# Patient Record
Sex: MALE | Race: WHITE | NOT HISPANIC OR LATINO | Employment: FULL TIME | URBAN - METROPOLITAN AREA
[De-identification: names, ages, dates, MRNs, and addresses within clinical notes are randomized per-mention and may not be internally consistent; named-entity substitution may affect disease eponyms.]

---

## 2023-05-05 LAB
ALANINE AMINOTRANSFERASE (SGPT) (U/L) IN SER/PLAS: 81 U/L (ref 10–52)
ALBUMIN (G/DL) IN SER/PLAS: 4.1 G/DL (ref 3.4–5)
ALKALINE PHOSPHATASE (U/L) IN SER/PLAS: 190 U/L (ref 33–136)
ANION GAP IN SER/PLAS: 14 MMOL/L (ref 10–20)
ASPARTATE AMINOTRANSFERASE (SGOT) (U/L) IN SER/PLAS: 132 U/L (ref 9–39)
BILIRUBIN TOTAL (MG/DL) IN SER/PLAS: 1.2 MG/DL (ref 0–1.2)
CALCIUM (MG/DL) IN SER/PLAS: 10.1 MG/DL (ref 8.6–10.6)
CARBON DIOXIDE, TOTAL (MMOL/L) IN SER/PLAS: 29 MMOL/L (ref 21–32)
CHLORIDE (MMOL/L) IN SER/PLAS: 102 MMOL/L (ref 98–107)
CREATININE (MG/DL) IN SER/PLAS: 0.85 MG/DL (ref 0.5–1.3)
ERYTHROCYTE DISTRIBUTION WIDTH (RATIO) BY AUTOMATED COUNT: 15.2 % (ref 11.5–14.5)
ERYTHROCYTE MEAN CORPUSCULAR HEMOGLOBIN CONCENTRATION (G/DL) BY AUTOMATED: 32.9 G/DL (ref 32–36)
ERYTHROCYTE MEAN CORPUSCULAR VOLUME (FL) BY AUTOMATED COUNT: 105 FL (ref 80–100)
ERYTHROCYTES (10*6/UL) IN BLOOD BY AUTOMATED COUNT: 3.94 X10E12/L (ref 4.5–5.9)
GFR MALE: >90 ML/MIN/1.73M2
GLUCOSE (MG/DL) IN SER/PLAS: 106 MG/DL (ref 74–99)
HEMATOCRIT (%) IN BLOOD BY AUTOMATED COUNT: 41.4 % (ref 41–52)
HEMOGLOBIN (G/DL) IN BLOOD: 13.6 G/DL (ref 13.5–17.5)
INR IN PPP BY COAGULATION ASSAY: 1.1 (ref 0.9–1.1)
LEUKOCYTES (10*3/UL) IN BLOOD BY AUTOMATED COUNT: 9.4 X10E9/L (ref 4.4–11.3)
NRBC (PER 100 WBCS) BY AUTOMATED COUNT: 0 /100 WBC (ref 0–0)
PLATELETS (10*3/UL) IN BLOOD AUTOMATED COUNT: 154 X10E9/L (ref 150–450)
POTASSIUM (MMOL/L) IN SER/PLAS: 4.5 MMOL/L (ref 3.5–5.3)
PROTEIN TOTAL: 8 G/DL (ref 6.4–8.2)
PROTHROMBIN TIME (PT) IN PPP BY COAGULATION ASSAY: 13 SEC (ref 9.8–13.4)
SODIUM (MMOL/L) IN SER/PLAS: 140 MMOL/L (ref 136–145)
UREA NITROGEN (MG/DL) IN SER/PLAS: 16 MG/DL (ref 6–23)

## 2023-05-15 LAB — HEMOCHROMATOSIS INTERPRETATION: NORMAL

## 2023-07-27 ENCOUNTER — OFFICE VISIT (OUTPATIENT)
Dept: PRIMARY CARE | Facility: CLINIC | Age: 63
End: 2023-07-27
Payer: COMMERCIAL

## 2023-07-27 VITALS
HEART RATE: 67 BPM | BODY MASS INDEX: 30.84 KG/M2 | WEIGHT: 227.4 LBS | DIASTOLIC BLOOD PRESSURE: 70 MMHG | OXYGEN SATURATION: 96 % | SYSTOLIC BLOOD PRESSURE: 108 MMHG

## 2023-07-27 DIAGNOSIS — F10.10 ALCOHOL ABUSE: Primary | ICD-10-CM

## 2023-07-27 PROBLEM — K76.0 HEPATIC STEATOSIS: Status: ACTIVE | Noted: 2023-07-27

## 2023-07-27 PROBLEM — M48.061 SPINAL STENOSIS OF LUMBAR REGION: Status: ACTIVE | Noted: 2023-07-27

## 2023-07-27 PROBLEM — M51.36 DEGENERATION OF INTERVERTEBRAL DISC OF LUMBAR REGION: Status: ACTIVE | Noted: 2023-07-27

## 2023-07-27 PROBLEM — I10 BENIGN ESSENTIAL HTN: Status: ACTIVE | Noted: 2023-07-27

## 2023-07-27 PROBLEM — M10.9 GOUT: Status: ACTIVE | Noted: 2018-10-02

## 2023-07-27 PROBLEM — M10.9 GOUT: Status: ACTIVE | Noted: 2023-07-27

## 2023-07-27 PROBLEM — R79.89 ABNORMAL LFTS: Status: ACTIVE | Noted: 2023-07-27

## 2023-07-27 PROBLEM — R97.20 ELEVATED PSA: Status: ACTIVE | Noted: 2023-07-27

## 2023-07-27 PROBLEM — I10 HYPERTENSION: Status: ACTIVE | Noted: 2023-07-27

## 2023-07-27 PROBLEM — C61 PROSTATE CANCER (MULTI): Status: ACTIVE | Noted: 2023-07-27

## 2023-07-27 PROBLEM — M06.9 RHEUMATOID ARTHRITIS (MULTI): Status: ACTIVE | Noted: 2018-10-12

## 2023-07-27 PROBLEM — K20.90 ESOPHAGITIS: Status: ACTIVE | Noted: 2023-07-27

## 2023-07-27 PROBLEM — M51.369 DEGENERATION OF INTERVERTEBRAL DISC OF LUMBAR REGION: Status: ACTIVE | Noted: 2023-07-27

## 2023-07-27 PROBLEM — M72.2 BILATERAL PLANTAR FASCIITIS: Status: ACTIVE | Noted: 2023-07-27

## 2023-07-27 PROBLEM — G62.9 NEUROPATHY, PERIPHERAL: Status: ACTIVE | Noted: 2023-07-27

## 2023-07-27 PROBLEM — N52.9 MALE ERECTILE DISORDER: Status: ACTIVE | Noted: 2023-07-27

## 2023-07-27 PROBLEM — Z95.0 PACEMAKER: Status: ACTIVE | Noted: 2023-07-27

## 2023-07-27 PROCEDURE — 3078F DIAST BP <80 MM HG: CPT | Performed by: STUDENT IN AN ORGANIZED HEALTH CARE EDUCATION/TRAINING PROGRAM

## 2023-07-27 PROCEDURE — 3074F SYST BP LT 130 MM HG: CPT | Performed by: STUDENT IN AN ORGANIZED HEALTH CARE EDUCATION/TRAINING PROGRAM

## 2023-07-27 PROCEDURE — 99214 OFFICE O/P EST MOD 30 MIN: CPT | Performed by: STUDENT IN AN ORGANIZED HEALTH CARE EDUCATION/TRAINING PROGRAM

## 2023-07-27 PROCEDURE — 3008F BODY MASS INDEX DOCD: CPT | Performed by: STUDENT IN AN ORGANIZED HEALTH CARE EDUCATION/TRAINING PROGRAM

## 2023-07-27 RX ORDER — PANTOPRAZOLE SODIUM 40 MG/1
1 TABLET, DELAYED RELEASE ORAL DAILY
COMMUNITY
Start: 2022-11-28 | End: 2023-12-26

## 2023-07-27 RX ORDER — FOLIC ACID 1 MG/1
1 TABLET ORAL DAILY
COMMUNITY
Start: 2022-12-13 | End: 2023-12-28

## 2023-07-27 RX ORDER — DIAZEPAM 5 MG/1
5 TABLET ORAL NIGHTLY PRN
Qty: 10 TABLET | Refills: 0 | Status: SHIPPED | OUTPATIENT
Start: 2023-07-27 | End: 2023-10-26 | Stop reason: SDUPTHER

## 2023-07-27 RX ORDER — AMLODIPINE BESYLATE 5 MG/1
TABLET ORAL EVERY 24 HOURS
COMMUNITY
Start: 2015-09-29 | End: 2023-10-26 | Stop reason: ALTCHOICE

## 2023-07-27 RX ORDER — MULTIVIT-MIN/IRON FUM/FOLIC AC 7.5 MG-4
1 TABLET ORAL DAILY
COMMUNITY

## 2023-07-27 RX ORDER — ERGOCALCIFEROL 1.25 MG/1
CAPSULE ORAL
COMMUNITY
Start: 2018-02-23 | End: 2023-10-26 | Stop reason: ALTCHOICE

## 2023-07-27 RX ORDER — ALBUTEROL SULFATE 90 UG/1
AEROSOL, METERED RESPIRATORY (INHALATION)
COMMUNITY
Start: 2021-12-16 | End: 2023-10-26 | Stop reason: ALTCHOICE

## 2023-07-27 RX ORDER — TADALAFIL 20 MG/1
TABLET ORAL
COMMUNITY
Start: 2017-06-20 | End: 2023-10-26 | Stop reason: ALTCHOICE

## 2023-07-27 RX ORDER — METHYLPREDNISOLONE 4 MG/1
TABLET ORAL
COMMUNITY
Start: 2022-10-06 | End: 2023-10-26 | Stop reason: ALTCHOICE

## 2023-07-27 RX ORDER — PREDNISONE 50 MG/1
TABLET ORAL
COMMUNITY
Start: 2019-07-16 | End: 2023-10-26 | Stop reason: ALTCHOICE

## 2023-07-27 RX ORDER — GABAPENTIN 100 MG/1
1 CAPSULE ORAL 3 TIMES DAILY
COMMUNITY
Start: 2023-01-17 | End: 2023-10-26 | Stop reason: ALTCHOICE

## 2023-07-27 RX ORDER — ASPIRIN 81 MG/1
TABLET ORAL
COMMUNITY
Start: 2018-02-23 | End: 2023-10-26 | Stop reason: ALTCHOICE

## 2023-07-27 RX ORDER — ALLOPURINOL 300 MG/1
300 TABLET ORAL DAILY
COMMUNITY
Start: 2020-07-02 | End: 2023-12-26

## 2023-07-27 RX ORDER — TAMSULOSIN HYDROCHLORIDE 0.4 MG/1
1 CAPSULE ORAL DAILY
COMMUNITY
Start: 2022-11-28 | End: 2023-12-26

## 2023-07-27 RX ORDER — DOXYCYCLINE HYCLATE 100 MG
TABLET ORAL
COMMUNITY
Start: 2022-11-28 | End: 2023-10-26 | Stop reason: ALTCHOICE

## 2023-07-27 RX ORDER — LISINOPRIL 30 MG/1
TABLET ORAL
COMMUNITY
Start: 2014-12-18 | End: 2023-10-26 | Stop reason: ALTCHOICE

## 2023-07-27 RX ORDER — METOPROLOL TARTRATE 50 MG/1
1 TABLET ORAL 2 TIMES DAILY
COMMUNITY
Start: 2022-11-28 | End: 2023-10-24

## 2023-07-27 RX ORDER — HYDROCHLOROTHIAZIDE 25 MG/1
TABLET ORAL
COMMUNITY
Start: 2022-05-17 | End: 2023-10-26 | Stop reason: ALTCHOICE

## 2023-07-27 RX ORDER — GABAPENTIN 300 MG/1
CAPSULE ORAL
COMMUNITY
End: 2023-10-26 | Stop reason: ALTCHOICE

## 2023-07-27 RX ORDER — LANOLIN ALCOHOL/MO/W.PET/CERES
CREAM (GRAM) TOPICAL
COMMUNITY
Start: 2022-11-28 | End: 2023-10-26 | Stop reason: ALTCHOICE

## 2023-07-27 RX ORDER — SILDENAFIL CITRATE 20 MG/1
TABLET ORAL
COMMUNITY
Start: 2020-07-17 | End: 2023-10-26 | Stop reason: ALTCHOICE

## 2023-07-27 RX ORDER — CHLORHEXIDINE GLUCONATE ORAL RINSE 1.2 MG/ML
SOLUTION DENTAL
COMMUNITY
Start: 2023-05-25 | End: 2023-10-26 | Stop reason: ALTCHOICE

## 2023-07-27 RX ORDER — DISULFIRAM 250 MG/1
250 TABLET ORAL DAILY
Qty: 90 TABLET | Refills: 3 | Status: SHIPPED | OUTPATIENT
Start: 2023-07-27 | End: 2024-03-23 | Stop reason: ALTCHOICE

## 2023-07-27 RX ORDER — CLINDAMYCIN HYDROCHLORIDE 300 MG/1
CAPSULE ORAL
COMMUNITY
Start: 2023-04-28 | End: 2023-10-26 | Stop reason: ALTCHOICE

## 2023-07-27 ASSESSMENT — PAIN SCALES - GENERAL: PAINLEVEL: 7

## 2023-07-27 ASSESSMENT — ENCOUNTER SYMPTOMS: DEPRESSION: 0

## 2023-07-27 NOTE — PATIENT INSTRUCTIONS
1.  Alcohol abuse.  Had recurrence recently.  He is going to try and discontinue over the next several days.  Small prescription of diazepam to help avoid withdrawals.  When you are ready and alcohol free for greater than 12 hours can start Antabuse 250 mg once per day.  Also advised to establish with alcohol and wellness meetings

## 2023-07-27 NOTE — PROGRESS NOTES
Subjective   Patient ID: Trey Borjas is a 63 y.o. male who presents for Alcohol Problem.    HPI after hospital admission in December for alcoholism he was doing well for several months then the past 2 months he is relapsed.    Review of Systems  Constitutional: NO F, chills, or sweats  Eyes: no blurred vision or visual disturbance  ENT: no hearing loss, no congestion, no nasal discharge, no hoarseness and no sore throat.   Cardiovascular: no chest pain, no edema, no palps and no syncope.   Respiratory: no cough,no s.o.b. and no wheezing  Gastrointestinal: no abdominal pain, No C/D no N/V, no blood in stools  Genitourinary: no dysuria, no change in urinary frequency, no urinary hesitancy and no feelings of urinary urgency.   Musculoskeletal: no arthralgias,  no back pain and no myalgias.   Integumentary: no new skin lesions and no rashes.   Neurological: no difficulty walking, no headache, no limb weakness, no numbness and no tingling.   Psychiatric: Relapse with alcoholism  Objective   /70 (BP Location: Left arm, Patient Position: Sitting, BP Cuff Size: Adult)   Pulse 67   Wt 103 kg (227 lb 6.4 oz)   SpO2 96%   BMI 30.84 kg/m²     Physical Exam  gen- a & o x 3, nad, pleasant  heent- eomi, perrla, ear canals patent, TM's non-erythematous, no fluid, frontal and maxillary sinus's nontender  neck- supple, nontender, no palpable or enlarged nodes, no thyromegaly  heart- rrr, no murmurs  lungs- cta b/l , no w/r/r    Assessment/Plan     1.  Alcohol abuse.  Had recurrence recently.  He is going to try and discontinue over the next several days.  Small prescription of diazepam to help avoid withdrawals.  When you are ready and alcohol free for greater than 12 hours can start Antabuse 250 mg once per day.  Also advised to establish with alcohol and wellness meetings

## 2023-09-22 ENCOUNTER — TELEPHONE (OUTPATIENT)
Dept: PRIMARY CARE | Facility: CLINIC | Age: 63
End: 2023-09-22
Payer: COMMERCIAL

## 2023-09-28 ENCOUNTER — OFFICE VISIT (OUTPATIENT)
Dept: PRIMARY CARE | Facility: CLINIC | Age: 63
End: 2023-09-28
Payer: COMMERCIAL

## 2023-09-28 VITALS
SYSTOLIC BLOOD PRESSURE: 130 MMHG | BODY MASS INDEX: 30 KG/M2 | WEIGHT: 221.2 LBS | DIASTOLIC BLOOD PRESSURE: 80 MMHG | HEART RATE: 75 BPM | OXYGEN SATURATION: 98 %

## 2023-09-28 DIAGNOSIS — L73.9 FOLLICULITIS: Primary | ICD-10-CM

## 2023-09-28 DIAGNOSIS — Z23 IMMUNIZATION DUE: ICD-10-CM

## 2023-09-28 PROCEDURE — 3075F SYST BP GE 130 - 139MM HG: CPT | Performed by: STUDENT IN AN ORGANIZED HEALTH CARE EDUCATION/TRAINING PROGRAM

## 2023-09-28 PROCEDURE — 99213 OFFICE O/P EST LOW 20 MIN: CPT | Performed by: STUDENT IN AN ORGANIZED HEALTH CARE EDUCATION/TRAINING PROGRAM

## 2023-09-28 PROCEDURE — 90471 IMMUNIZATION ADMIN: CPT | Performed by: STUDENT IN AN ORGANIZED HEALTH CARE EDUCATION/TRAINING PROGRAM

## 2023-09-28 PROCEDURE — 3008F BODY MASS INDEX DOCD: CPT | Performed by: STUDENT IN AN ORGANIZED HEALTH CARE EDUCATION/TRAINING PROGRAM

## 2023-09-28 PROCEDURE — 3079F DIAST BP 80-89 MM HG: CPT | Performed by: STUDENT IN AN ORGANIZED HEALTH CARE EDUCATION/TRAINING PROGRAM

## 2023-09-28 PROCEDURE — 90686 IIV4 VACC NO PRSV 0.5 ML IM: CPT | Performed by: STUDENT IN AN ORGANIZED HEALTH CARE EDUCATION/TRAINING PROGRAM

## 2023-09-28 RX ORDER — SULFAMETHOXAZOLE AND TRIMETHOPRIM 800; 160 MG/1; MG/1
1 TABLET ORAL 2 TIMES DAILY
Qty: 20 TABLET | Refills: 0 | Status: SHIPPED | OUTPATIENT
Start: 2023-09-28 | End: 2023-10-08

## 2023-09-28 ASSESSMENT — ENCOUNTER SYMPTOMS: DEPRESSION: 0

## 2023-09-28 ASSESSMENT — PAIN SCALES - GENERAL: PAINLEVEL: 6

## 2023-09-28 NOTE — PROGRESS NOTES
Subjective   Patient ID: Trey Borjas is a 63 y.o. male who presents for Rash (Rash on scalp,neck, and around eyes.).    HPI comes in for rash posterior scalp    Review of Systems  Derm-rash  Objective   /80 (BP Location: Left arm, Patient Position: Sitting, BP Cuff Size: Adult)   Pulse 75   Wt 100 kg (221 lb 3.2 oz)   SpO2 98%   BMI 30.00 kg/m²     Physical Exam  Derm-folliculitis multiple areas posterior scalp  Assessment/Plan     1.  Folliculitis posterior scalp.  Bactrim p.o. twice daily x10 days.

## 2023-10-24 DIAGNOSIS — I10 ESSENTIAL (PRIMARY) HYPERTENSION: ICD-10-CM

## 2023-10-24 RX ORDER — METOPROLOL TARTRATE 50 MG/1
50 TABLET ORAL 2 TIMES DAILY
Qty: 180 TABLET | Refills: 3 | Status: SHIPPED | OUTPATIENT
Start: 2023-10-24 | End: 2024-04-22 | Stop reason: HOSPADM

## 2023-10-26 ENCOUNTER — OFFICE VISIT (OUTPATIENT)
Dept: PRIMARY CARE | Facility: CLINIC | Age: 63
End: 2023-10-26
Payer: COMMERCIAL

## 2023-10-26 VITALS
DIASTOLIC BLOOD PRESSURE: 66 MMHG | BODY MASS INDEX: 30.14 KG/M2 | SYSTOLIC BLOOD PRESSURE: 122 MMHG | WEIGHT: 222.2 LBS | HEART RATE: 72 BPM | OXYGEN SATURATION: 98 %

## 2023-10-26 DIAGNOSIS — F10.10 ALCOHOL ABUSE: ICD-10-CM

## 2023-10-26 PROCEDURE — 99214 OFFICE O/P EST MOD 30 MIN: CPT | Performed by: STUDENT IN AN ORGANIZED HEALTH CARE EDUCATION/TRAINING PROGRAM

## 2023-10-26 PROCEDURE — 3078F DIAST BP <80 MM HG: CPT | Performed by: STUDENT IN AN ORGANIZED HEALTH CARE EDUCATION/TRAINING PROGRAM

## 2023-10-26 PROCEDURE — 3074F SYST BP LT 130 MM HG: CPT | Performed by: STUDENT IN AN ORGANIZED HEALTH CARE EDUCATION/TRAINING PROGRAM

## 2023-10-26 PROCEDURE — 3008F BODY MASS INDEX DOCD: CPT | Performed by: STUDENT IN AN ORGANIZED HEALTH CARE EDUCATION/TRAINING PROGRAM

## 2023-10-26 RX ORDER — DIAZEPAM 5 MG/1
5 TABLET ORAL NIGHTLY PRN
Qty: 10 TABLET | Refills: 0 | Status: SHIPPED | OUTPATIENT
Start: 2023-10-26 | End: 2024-03-23 | Stop reason: ALTCHOICE

## 2023-10-26 ASSESSMENT — ENCOUNTER SYMPTOMS: DEPRESSION: 0

## 2023-10-26 ASSESSMENT — PAIN SCALES - GENERAL: PAINLEVEL: 7

## 2023-10-26 NOTE — PROGRESS NOTES
Subjective   Patient ID: Trey Borjas is a 63 y.o. male who presents for neuropathy.    HPI comes in to discuss peripheral neuropathy.  Alcoholism.    Review of Systems  Constitutional: NO F, chills, or sweats  Eyes: no blurred vision or visual disturbance  ENT: no hearing loss, no congestion, no nasal discharge, no hoarseness and no sore throat.   Cardiovascular: no chest pain, no edema, no palps and no syncope.   Respiratory: no cough,no s.o.b. and no wheezing  Gastrointestinal: no abdominal pain, No C/D no N/V, no blood in stools  Genitourinary: no dysuria, no change in urinary frequency, no urinary hesitancy and no feelings of urinary urgency.   Musculoskeletal: no arthralgias,  no back pain and no myalgias.   Integumentary: no new skin lesions and no rashes.   Neurological: no difficulty walking, no headache, no limb weakness, chronic lower extremity peripheral neuropathy  Psychiatric: Chronic alcoholism had relapse  Endocrine: no recent weight gain and no recent weight loss.   Hematologic/Lymphatic: no tendency for easy bruising and no swollen glands.  Objective   /66 (BP Location: Left arm, Patient Position: Sitting, BP Cuff Size: Adult)   Pulse 72   Wt 101 kg (222 lb 3.2 oz)   SpO2 98%   BMI 30.14 kg/m²     Physical Exam  gen- a & o x 3, nad, pleasant  heent- eomi, perrla, ear canals patent, TM's non-erythematous, no fluid, frontal and maxillary sinus's nontender  neck- supple, nontender, no palpable or enlarged nodes, no thyromegaly  heart- rrr, no murmurs  lungs- cta b/l , no w/r/r    Assessment/Plan     1.  Chronic peripheral neuropathy.  FMLA filled out for work.    2.  Alcoholism.  He is going to use a short course of diazepam to help him with withdrawals.  then he is going to use Antabuse.  And establish with AA

## 2023-10-26 NOTE — PATIENT INSTRUCTIONS
1.  Chronic peripheral neuropathy.  FMLA filled out for work.    2.  Alcoholism.  He is going to use a short course of diazepam to help him with withdrawals.  then he is going to use Antabuse.  And establish with AA

## 2023-12-23 DIAGNOSIS — C61 MALIGNANT NEOPLASM OF PROSTATE (MULTI): ICD-10-CM

## 2023-12-23 DIAGNOSIS — Z00.00 ENCOUNTER FOR GENERAL ADULT MEDICAL EXAMINATION WITHOUT ABNORMAL FINDINGS: ICD-10-CM

## 2023-12-24 DIAGNOSIS — M10.9 GOUT, UNSPECIFIED: ICD-10-CM

## 2023-12-26 RX ORDER — ALLOPURINOL 300 MG/1
300 TABLET ORAL DAILY
Qty: 90 TABLET | Refills: 3 | Status: SHIPPED | OUTPATIENT
Start: 2023-12-26

## 2023-12-26 RX ORDER — PANTOPRAZOLE SODIUM 40 MG/1
40 TABLET, DELAYED RELEASE ORAL DAILY
Qty: 90 TABLET | Refills: 3 | Status: SHIPPED | OUTPATIENT
Start: 2023-12-26

## 2023-12-26 RX ORDER — TAMSULOSIN HYDROCHLORIDE 0.4 MG/1
0.4 CAPSULE ORAL DAILY
Qty: 90 CAPSULE | Refills: 3 | Status: SHIPPED | OUTPATIENT
Start: 2023-12-26

## 2023-12-28 DIAGNOSIS — Z00.00 ENCOUNTER FOR GENERAL ADULT MEDICAL EXAMINATION WITHOUT ABNORMAL FINDINGS: ICD-10-CM

## 2023-12-28 RX ORDER — FOLIC ACID 1 MG/1
1 TABLET ORAL DAILY
Qty: 90 TABLET | Refills: 3 | Status: SHIPPED | OUTPATIENT
Start: 2023-12-28

## 2023-12-28 RX ORDER — LANOLIN ALCOHOL/MO/W.PET/CERES
100 CREAM (GRAM) TOPICAL DAILY
COMMUNITY
Start: 2023-12-01 | End: 2024-04-02

## 2024-03-23 ENCOUNTER — OFFICE VISIT (OUTPATIENT)
Dept: PRIMARY CARE | Facility: CLINIC | Age: 64
End: 2024-03-23
Payer: COMMERCIAL

## 2024-03-23 VITALS
WEIGHT: 211 LBS | SYSTOLIC BLOOD PRESSURE: 126 MMHG | OXYGEN SATURATION: 97 % | BODY MASS INDEX: 28.62 KG/M2 | DIASTOLIC BLOOD PRESSURE: 70 MMHG | HEART RATE: 89 BPM

## 2024-03-23 DIAGNOSIS — M65.351 TRIGGER FINGER OF ALL DIGITS OF RIGHT HAND: ICD-10-CM

## 2024-03-23 DIAGNOSIS — M65.321 TRIGGER FINGER OF ALL DIGITS OF RIGHT HAND: ICD-10-CM

## 2024-03-23 DIAGNOSIS — M65.341 TRIGGER FINGER OF ALL DIGITS OF RIGHT HAND: ICD-10-CM

## 2024-03-23 DIAGNOSIS — M65.311 TRIGGER FINGER OF ALL DIGITS OF RIGHT HAND: ICD-10-CM

## 2024-03-23 DIAGNOSIS — M65.331 TRIGGER FINGER OF ALL DIGITS OF RIGHT HAND: ICD-10-CM

## 2024-03-23 DIAGNOSIS — G62.89 OTHER POLYNEUROPATHY: Primary | ICD-10-CM

## 2024-03-23 PROCEDURE — 99213 OFFICE O/P EST LOW 20 MIN: CPT | Performed by: STUDENT IN AN ORGANIZED HEALTH CARE EDUCATION/TRAINING PROGRAM

## 2024-03-23 PROCEDURE — 3078F DIAST BP <80 MM HG: CPT | Performed by: STUDENT IN AN ORGANIZED HEALTH CARE EDUCATION/TRAINING PROGRAM

## 2024-03-23 PROCEDURE — 3074F SYST BP LT 130 MM HG: CPT | Performed by: STUDENT IN AN ORGANIZED HEALTH CARE EDUCATION/TRAINING PROGRAM

## 2024-03-23 RX ORDER — GABAPENTIN 300 MG/1
300 CAPSULE ORAL NIGHTLY
Qty: 90 CAPSULE | Refills: 3 | Status: SHIPPED | OUTPATIENT
Start: 2024-03-23 | End: 2024-04-22 | Stop reason: HOSPADM

## 2024-03-23 ASSESSMENT — ENCOUNTER SYMPTOMS: DEPRESSION: 0

## 2024-03-23 ASSESSMENT — PAIN SCALES - GENERAL: PAINLEVEL: 7

## 2024-03-23 NOTE — PROGRESS NOTES
Subjective   Patient ID: Trey Borjas is a 63 y.o. male who presents for Follow-up (Feet and hand paimn /C/o; rash ).    HPI comes in to discuss neuropathy treatment.  Also right hand pain for several months mostly the middle finger    Review of Systems  Constitutional: NO F, chills, or sweats  Eyes: no blurred vision or visual disturbance  ENT: no hearing loss, no congestion, no nasal discharge, no hoarseness and no sore throat.   Cardiovascular: no chest pain, no edema, no palps and no syncope.   Respiratory: no cough,no s.o.b. and no wheezing  Gastrointestinal: no abdominal pain, No C/D no N/V, no blood in stools  Genitourinary: no dysuria, no change in urinary frequency, no urinary hesitancy and no feelings of urinary urgency.   Musculoskeletal: Right hand pain  Integumentary: no new skin lesions and no rashes.   Neurological: no difficulty walking, no headache, chronic neuropathy pain bilateral feet  Objective   /70 (BP Location: Left arm, Patient Position: Sitting)   Pulse 89   Wt 95.7 kg (211 lb)   SpO2 97%   BMI 28.62 kg/m²     Physical Exam  Right hand-full active passive range of motion full-strength, there is some tenderness some scarring of the middle finger tendon flexor tendon,  Assessment/Plan     1.  Peripheral neuropathy bilateral feet.  He is seeing podiatry.  Advised on gabapentin 300 mg p.o. nightly.    2.  Right hand pain, pain seems to be in the tendon of the right hand middle finger possible old injury.  Concern for possible trigger finger.  Watchful waiting at this time focus on stretching.  Can use diclofenac, Voltaren gel as needed.  Icing as needed.  If symptoms worsen we could consider hand surgery in the future.

## 2024-03-23 NOTE — PATIENT INSTRUCTIONS
1.  Peripheral neuropathy bilateral feet.  He is seeing podiatry.  Advised on gabapentin 300 mg p.o. nightly.    2.  Right hand pain, pain seems to be in the tendon of the right hand middle finger possible old injury.  Concern for possible trigger finger.  Watchful waiting at this time focus on stretching.  Can use diclofenac, Voltaren gel as needed.  Icing as needed.  If symptoms worsen we could consider hand surgery in the future.

## 2024-04-01 DIAGNOSIS — Z00.00 ENCOUNTER FOR GENERAL ADULT MEDICAL EXAMINATION WITHOUT ABNORMAL FINDINGS: ICD-10-CM

## 2024-04-02 RX ORDER — LANOLIN ALCOHOL/MO/W.PET/CERES
100 CREAM (GRAM) TOPICAL DAILY
Qty: 90 TABLET | Refills: 3 | Status: SHIPPED | OUTPATIENT
Start: 2024-04-02 | End: 2024-05-02 | Stop reason: ALTCHOICE

## 2024-04-10 ENCOUNTER — HOSPITAL ENCOUNTER (INPATIENT)
Facility: HOSPITAL | Age: 64
LOS: 10 days | Discharge: HOME HEALTH | DRG: 308 | End: 2024-04-22
Attending: STUDENT IN AN ORGANIZED HEALTH CARE EDUCATION/TRAINING PROGRAM | Admitting: INTERNAL MEDICINE
Payer: COMMERCIAL

## 2024-04-10 ENCOUNTER — APPOINTMENT (OUTPATIENT)
Dept: RADIOLOGY | Facility: HOSPITAL | Age: 64
DRG: 308 | End: 2024-04-10
Payer: COMMERCIAL

## 2024-04-10 ENCOUNTER — APPOINTMENT (OUTPATIENT)
Dept: CARDIOLOGY | Facility: HOSPITAL | Age: 64
DRG: 308 | End: 2024-04-10
Payer: COMMERCIAL

## 2024-04-10 DIAGNOSIS — K70.30 ALCOHOLIC CIRRHOSIS, UNSPECIFIED WHETHER ASCITES PRESENT (MULTI): ICD-10-CM

## 2024-04-10 DIAGNOSIS — I48.92 ATRIAL FLUTTER, UNSPECIFIED TYPE (MULTI): Primary | ICD-10-CM

## 2024-04-10 DIAGNOSIS — F10.10 ALCOHOL ABUSE: ICD-10-CM

## 2024-04-10 DIAGNOSIS — R74.8 ELEVATED LIVER ENZYMES: ICD-10-CM

## 2024-04-10 DIAGNOSIS — E87.29 HIGH ANION GAP METABOLIC ACIDOSIS: ICD-10-CM

## 2024-04-10 DIAGNOSIS — E87.6 HYPOKALEMIA: ICD-10-CM

## 2024-04-10 DIAGNOSIS — K92.0 HEMATEMESIS, UNSPECIFIED WHETHER NAUSEA PRESENT: ICD-10-CM

## 2024-04-10 DIAGNOSIS — I46.9 CARDIAC ARREST (MULTI): ICD-10-CM

## 2024-04-10 DIAGNOSIS — G62.89 OTHER POLYNEUROPATHY: ICD-10-CM

## 2024-04-10 DIAGNOSIS — S06.5XAA SUBDURAL HEMATOMA (MULTI): ICD-10-CM

## 2024-04-10 DIAGNOSIS — K20.90 ESOPHAGITIS: ICD-10-CM

## 2024-04-10 LAB
ALBUMIN SERPL BCP-MCNC: 3.4 G/DL (ref 3.4–5)
ALP SERPL-CCNC: 213 U/L (ref 33–136)
ALT SERPL W P-5'-P-CCNC: 40 U/L (ref 10–52)
ANION GAP SERPL CALC-SCNC: 21 MMOL/L (ref 10–20)
ANION GAP SERPL CALC-SCNC: 25 MMOL/L (ref 10–20)
AST SERPL W P-5'-P-CCNC: 108 U/L (ref 9–39)
BASOPHILS # BLD AUTO: 0.08 X10*3/UL (ref 0–0.1)
BASOPHILS NFR BLD AUTO: 1 %
BILIRUB DIRECT SERPL-MCNC: 2 MG/DL (ref 0–0.3)
BILIRUB SERPL-MCNC: 5.2 MG/DL (ref 0–1.2)
BNP SERPL-MCNC: 154 PG/ML (ref 0–99)
BUN SERPL-MCNC: 11 MG/DL (ref 6–23)
BUN SERPL-MCNC: 11 MG/DL (ref 6–23)
CALCIUM SERPL-MCNC: 8.9 MG/DL (ref 8.6–10.3)
CALCIUM SERPL-MCNC: 9.2 MG/DL (ref 8.6–10.3)
CARDIAC TROPONIN I PNL SERPL HS: 30 NG/L (ref 0–20)
CARDIAC TROPONIN I PNL SERPL HS: 40 NG/L (ref 0–20)
CHLORIDE SERPL-SCNC: 101 MMOL/L (ref 98–107)
CHLORIDE SERPL-SCNC: 99 MMOL/L (ref 98–107)
CO2 SERPL-SCNC: 18 MMOL/L (ref 21–32)
CO2 SERPL-SCNC: 20 MMOL/L (ref 21–32)
CREAT SERPL-MCNC: 0.69 MG/DL (ref 0.5–1.3)
CREAT SERPL-MCNC: 0.77 MG/DL (ref 0.5–1.3)
EGFRCR SERPLBLD CKD-EPI 2021: >90 ML/MIN/1.73M*2
EGFRCR SERPLBLD CKD-EPI 2021: >90 ML/MIN/1.73M*2
EOSINOPHIL # BLD AUTO: 0.03 X10*3/UL (ref 0–0.7)
EOSINOPHIL NFR BLD AUTO: 0.4 %
ERYTHROCYTE [DISTWIDTH] IN BLOOD BY AUTOMATED COUNT: 16.7 % (ref 11.5–14.5)
GLUCOSE SERPL-MCNC: 116 MG/DL (ref 74–99)
GLUCOSE SERPL-MCNC: 137 MG/DL (ref 74–99)
HCT VFR BLD AUTO: 37.3 % (ref 41–52)
HGB BLD-MCNC: 12.6 G/DL (ref 13.5–17.5)
IMM GRANULOCYTES # BLD AUTO: 0.12 X10*3/UL (ref 0–0.7)
IMM GRANULOCYTES NFR BLD AUTO: 1.4 % (ref 0–0.9)
LIPASE SERPL-CCNC: 42 U/L (ref 9–82)
LYMPHOCYTES # BLD AUTO: 0.91 X10*3/UL (ref 1.2–4.8)
LYMPHOCYTES NFR BLD AUTO: 10.9 %
MAGNESIUM SERPL-MCNC: 1.8 MG/DL (ref 1.6–2.4)
MCH RBC QN AUTO: 33.6 PG (ref 26–34)
MCHC RBC AUTO-ENTMCNC: 33.8 G/DL (ref 32–36)
MCV RBC AUTO: 100 FL (ref 80–100)
MONOCYTES # BLD AUTO: 0.78 X10*3/UL (ref 0.1–1)
MONOCYTES NFR BLD AUTO: 9.4 %
NEUTROPHILS # BLD AUTO: 6.42 X10*3/UL (ref 1.2–7.7)
NEUTROPHILS NFR BLD AUTO: 76.9 %
NRBC BLD-RTO: 0.2 /100 WBCS (ref 0–0)
PLATELET # BLD AUTO: 168 X10*3/UL (ref 150–450)
POTASSIUM SERPL-SCNC: 3.2 MMOL/L (ref 3.5–5.3)
POTASSIUM SERPL-SCNC: 3.4 MMOL/L (ref 3.5–5.3)
PROT SERPL-MCNC: 7.3 G/DL (ref 6.4–8.2)
RBC # BLD AUTO: 3.75 X10*6/UL (ref 4.5–5.9)
SODIUM SERPL-SCNC: 139 MMOL/L (ref 136–145)
SODIUM SERPL-SCNC: 139 MMOL/L (ref 136–145)
WBC # BLD AUTO: 8.3 X10*3/UL (ref 4.4–11.3)

## 2024-04-10 PROCEDURE — 84484 ASSAY OF TROPONIN QUANT: CPT | Performed by: INTERNAL MEDICINE

## 2024-04-10 PROCEDURE — 83690 ASSAY OF LIPASE: CPT | Performed by: STUDENT IN AN ORGANIZED HEALTH CARE EDUCATION/TRAINING PROGRAM

## 2024-04-10 PROCEDURE — 2500000005 HC RX 250 GENERAL PHARMACY W/O HCPCS: Performed by: STUDENT IN AN ORGANIZED HEALTH CARE EDUCATION/TRAINING PROGRAM

## 2024-04-10 PROCEDURE — 93005 ELECTROCARDIOGRAM TRACING: CPT

## 2024-04-10 PROCEDURE — 85025 COMPLETE CBC W/AUTO DIFF WBC: CPT | Performed by: STUDENT IN AN ORGANIZED HEALTH CARE EDUCATION/TRAINING PROGRAM

## 2024-04-10 PROCEDURE — 36415 COLL VENOUS BLD VENIPUNCTURE: CPT | Performed by: STUDENT IN AN ORGANIZED HEALTH CARE EDUCATION/TRAINING PROGRAM

## 2024-04-10 PROCEDURE — G0378 HOSPITAL OBSERVATION PER HR: HCPCS

## 2024-04-10 PROCEDURE — 82248 BILIRUBIN DIRECT: CPT | Performed by: STUDENT IN AN ORGANIZED HEALTH CARE EDUCATION/TRAINING PROGRAM

## 2024-04-10 PROCEDURE — 76705 ECHO EXAM OF ABDOMEN: CPT

## 2024-04-10 PROCEDURE — 96365 THER/PROPH/DIAG IV INF INIT: CPT

## 2024-04-10 PROCEDURE — 80053 COMPREHEN METABOLIC PANEL: CPT | Performed by: STUDENT IN AN ORGANIZED HEALTH CARE EDUCATION/TRAINING PROGRAM

## 2024-04-10 PROCEDURE — 99285 EMERGENCY DEPT VISIT HI MDM: CPT | Mod: 25

## 2024-04-10 PROCEDURE — 2500000002 HC RX 250 W HCPCS SELF ADMINISTERED DRUGS (ALT 637 FOR MEDICARE OP, ALT 636 FOR OP/ED): Performed by: INTERNAL MEDICINE

## 2024-04-10 PROCEDURE — 2500000004 HC RX 250 GENERAL PHARMACY W/ HCPCS (ALT 636 FOR OP/ED): Performed by: INTERNAL MEDICINE

## 2024-04-10 PROCEDURE — 99223 1ST HOSP IP/OBS HIGH 75: CPT | Performed by: INTERNAL MEDICINE

## 2024-04-10 PROCEDURE — 96375 TX/PRO/DX INJ NEW DRUG ADDON: CPT

## 2024-04-10 PROCEDURE — 96367 TX/PROPH/DG ADDL SEQ IV INF: CPT

## 2024-04-10 PROCEDURE — 83880 ASSAY OF NATRIURETIC PEPTIDE: CPT | Performed by: STUDENT IN AN ORGANIZED HEALTH CARE EDUCATION/TRAINING PROGRAM

## 2024-04-10 PROCEDURE — 2500000001 HC RX 250 WO HCPCS SELF ADMINISTERED DRUGS (ALT 637 FOR MEDICARE OP): Performed by: STUDENT IN AN ORGANIZED HEALTH CARE EDUCATION/TRAINING PROGRAM

## 2024-04-10 PROCEDURE — 76705 ECHO EXAM OF ABDOMEN: CPT | Mod: FOREIGN READ | Performed by: RADIOLOGY

## 2024-04-10 PROCEDURE — 2500000001 HC RX 250 WO HCPCS SELF ADMINISTERED DRUGS (ALT 637 FOR MEDICARE OP): Performed by: INTERNAL MEDICINE

## 2024-04-10 PROCEDURE — 83735 ASSAY OF MAGNESIUM: CPT | Performed by: STUDENT IN AN ORGANIZED HEALTH CARE EDUCATION/TRAINING PROGRAM

## 2024-04-10 PROCEDURE — 2500000004 HC RX 250 GENERAL PHARMACY W/ HCPCS (ALT 636 FOR OP/ED): Performed by: STUDENT IN AN ORGANIZED HEALTH CARE EDUCATION/TRAINING PROGRAM

## 2024-04-10 PROCEDURE — 84484 ASSAY OF TROPONIN QUANT: CPT | Performed by: STUDENT IN AN ORGANIZED HEALTH CARE EDUCATION/TRAINING PROGRAM

## 2024-04-10 RX ORDER — POLYETHYLENE GLYCOL 3350 17 G/17G
17 POWDER, FOR SOLUTION ORAL DAILY PRN
Status: DISCONTINUED | OUTPATIENT
Start: 2024-04-10 | End: 2024-04-13

## 2024-04-10 RX ORDER — TALC
3 POWDER (GRAM) TOPICAL NIGHTLY PRN
Status: DISCONTINUED | OUTPATIENT
Start: 2024-04-10 | End: 2024-04-13

## 2024-04-10 RX ORDER — TAMSULOSIN HYDROCHLORIDE 0.4 MG/1
0.4 CAPSULE ORAL DAILY
Status: DISCONTINUED | OUTPATIENT
Start: 2024-04-10 | End: 2024-04-22 | Stop reason: HOSPADM

## 2024-04-10 RX ORDER — GABAPENTIN 300 MG/1
300 CAPSULE ORAL NIGHTLY
Status: DISCONTINUED | OUTPATIENT
Start: 2024-04-10 | End: 2024-04-13

## 2024-04-10 RX ORDER — PANTOPRAZOLE SODIUM 40 MG/1
40 TABLET, DELAYED RELEASE ORAL DAILY
Status: DISCONTINUED | OUTPATIENT
Start: 2024-04-10 | End: 2024-04-12

## 2024-04-10 RX ORDER — ENOXAPARIN SODIUM 100 MG/ML
40 INJECTION SUBCUTANEOUS EVERY 24 HOURS
Status: DISCONTINUED | OUTPATIENT
Start: 2024-04-10 | End: 2024-04-10

## 2024-04-10 RX ORDER — METOPROLOL TARTRATE 1 MG/ML
5 INJECTION, SOLUTION INTRAVENOUS ONCE
Status: COMPLETED | OUTPATIENT
Start: 2024-04-10 | End: 2024-04-10

## 2024-04-10 RX ORDER — LORAZEPAM 2 MG/ML
2 INJECTION INTRAMUSCULAR EVERY 2 HOUR PRN
Status: DISCONTINUED | OUTPATIENT
Start: 2024-04-10 | End: 2024-04-13

## 2024-04-10 RX ORDER — ENOXAPARIN SODIUM 100 MG/ML
1 INJECTION SUBCUTANEOUS 2 TIMES DAILY
Status: DISCONTINUED | OUTPATIENT
Start: 2024-04-11 | End: 2024-04-11

## 2024-04-10 RX ORDER — LANOLIN ALCOHOL/MO/W.PET/CERES
100 CREAM (GRAM) TOPICAL ONCE
Status: COMPLETED | OUTPATIENT
Start: 2024-04-10 | End: 2024-04-10

## 2024-04-10 RX ORDER — GUAIFENESIN 600 MG/1
600 TABLET, EXTENDED RELEASE ORAL EVERY 12 HOURS PRN
Status: DISCONTINUED | OUTPATIENT
Start: 2024-04-10 | End: 2024-04-13

## 2024-04-10 RX ORDER — LORAZEPAM 2 MG/ML
0.5 INJECTION INTRAMUSCULAR EVERY 2 HOUR PRN
Status: DISCONTINUED | OUTPATIENT
Start: 2024-04-10 | End: 2024-04-13

## 2024-04-10 RX ORDER — LANOLIN ALCOHOL/MO/W.PET/CERES
100 CREAM (GRAM) TOPICAL DAILY
Status: DISCONTINUED | OUTPATIENT
Start: 2024-04-10 | End: 2024-04-13

## 2024-04-10 RX ORDER — FOLIC ACID 1 MG/1
1 TABLET ORAL DAILY
Status: DISCONTINUED | OUTPATIENT
Start: 2024-04-10 | End: 2024-04-22 | Stop reason: HOSPADM

## 2024-04-10 RX ORDER — LORAZEPAM 2 MG/ML
1 INJECTION INTRAMUSCULAR EVERY 2 HOUR PRN
Status: DISCONTINUED | OUTPATIENT
Start: 2024-04-10 | End: 2024-04-13

## 2024-04-10 RX ORDER — MULTIVIT-MIN/IRON FUM/FOLIC AC 7.5 MG-4
1 TABLET ORAL DAILY
Status: DISCONTINUED | OUTPATIENT
Start: 2024-04-10 | End: 2024-04-22 | Stop reason: HOSPADM

## 2024-04-10 RX ORDER — MAGNESIUM SULFATE HEPTAHYDRATE 40 MG/ML
2 INJECTION, SOLUTION INTRAVENOUS ONCE
Status: COMPLETED | OUTPATIENT
Start: 2024-04-10 | End: 2024-04-10

## 2024-04-10 RX ORDER — ALLOPURINOL 300 MG/1
300 TABLET ORAL DAILY
Status: DISCONTINUED | OUTPATIENT
Start: 2024-04-10 | End: 2024-04-22 | Stop reason: HOSPADM

## 2024-04-10 RX ORDER — DEXTROSE, SODIUM CHLORIDE, SODIUM LACTATE, POTASSIUM CHLORIDE, AND CALCIUM CHLORIDE 5; .6; .31; .03; .02 G/100ML; G/100ML; G/100ML; G/100ML; G/100ML
125 INJECTION, SOLUTION INTRAVENOUS CONTINUOUS
Status: DISCONTINUED | OUTPATIENT
Start: 2024-04-10 | End: 2024-04-17

## 2024-04-10 RX ORDER — METOPROLOL TARTRATE 50 MG/1
50 TABLET ORAL 2 TIMES DAILY
Status: DISCONTINUED | OUTPATIENT
Start: 2024-04-10 | End: 2024-04-13

## 2024-04-10 RX ADMIN — METOPROLOL TARTRATE 50 MG: 50 TABLET, FILM COATED ORAL at 21:00

## 2024-04-10 RX ADMIN — THIAMINE HCL TAB 100 MG 100 MG: 100 TAB at 12:17

## 2024-04-10 RX ADMIN — FOLIC ACID 1 MG: 5 INJECTION, SOLUTION INTRAMUSCULAR; INTRAVENOUS; SUBCUTANEOUS at 12:17

## 2024-04-10 RX ADMIN — THIAMINE HCL TAB 100 MG 100 MG: 100 TAB at 16:33

## 2024-04-10 RX ADMIN — FOLIC ACID 1 MG: 1 TABLET ORAL at 16:33

## 2024-04-10 RX ADMIN — ALLOPURINOL 300 MG: 300 TABLET ORAL at 17:47

## 2024-04-10 RX ADMIN — LORAZEPAM 2 MG: 2 INJECTION INTRAMUSCULAR; INTRAVENOUS at 18:19

## 2024-04-10 RX ADMIN — Medication 1 TABLET: at 16:34

## 2024-04-10 RX ADMIN — Medication 3 MG: at 20:25

## 2024-04-10 RX ADMIN — METOPROLOL TARTRATE 5 MG: 5 INJECTION INTRAVENOUS at 15:13

## 2024-04-10 RX ADMIN — SODIUM CHLORIDE, SODIUM LACTATE, POTASSIUM CHLORIDE, CALCIUM CHLORIDE AND DEXTROSE MONOHYDRATE 125 ML/HR: 5; 600; 310; 30; 20 INJECTION, SOLUTION INTRAVENOUS at 12:17

## 2024-04-10 RX ADMIN — MAGNESIUM SULFATE HEPTAHYDRATE 2 G: 40 INJECTION, SOLUTION INTRAVENOUS at 10:31

## 2024-04-10 RX ADMIN — PANTOPRAZOLE SODIUM 40 MG: 40 TABLET, DELAYED RELEASE ORAL at 16:34

## 2024-04-10 RX ADMIN — LORAZEPAM 2 MG: 2 INJECTION INTRAMUSCULAR; INTRAVENOUS at 20:42

## 2024-04-10 RX ADMIN — TAMSULOSIN HYDROCHLORIDE 0.4 MG: 0.4 CAPSULE ORAL at 16:33

## 2024-04-10 RX ADMIN — GABAPENTIN 300 MG: 300 CAPSULE ORAL at 20:25

## 2024-04-10 RX ADMIN — LORAZEPAM 2 MG: 2 INJECTION INTRAMUSCULAR; INTRAVENOUS at 22:52

## 2024-04-10 SDOH — SOCIAL STABILITY: SOCIAL INSECURITY: ARE THERE ANY APPARENT SIGNS OF INJURIES/BEHAVIORS THAT COULD BE RELATED TO ABUSE/NEGLECT?: NO

## 2024-04-10 SDOH — SOCIAL STABILITY: SOCIAL INSECURITY: WERE YOU ABLE TO COMPLETE ALL THE BEHAVIORAL HEALTH SCREENINGS?: YES

## 2024-04-10 SDOH — SOCIAL STABILITY: SOCIAL INSECURITY: DO YOU FEEL UNSAFE GOING BACK TO THE PLACE WHERE YOU ARE LIVING?: NO

## 2024-04-10 SDOH — SOCIAL STABILITY: SOCIAL INSECURITY: ABUSE: ADULT

## 2024-04-10 SDOH — SOCIAL STABILITY: SOCIAL INSECURITY: DO YOU FEEL ANYONE HAS EXPLOITED OR TAKEN ADVANTAGE OF YOU FINANCIALLY OR OF YOUR PERSONAL PROPERTY?: NO

## 2024-04-10 SDOH — SOCIAL STABILITY: SOCIAL INSECURITY: DOES ANYONE TRY TO KEEP YOU FROM HAVING/CONTACTING OTHER FRIENDS OR DOING THINGS OUTSIDE YOUR HOME?: NO

## 2024-04-10 SDOH — SOCIAL STABILITY: SOCIAL INSECURITY: ARE YOU OR HAVE YOU BEEN THREATENED OR ABUSED PHYSICALLY, EMOTIONALLY, OR SEXUALLY BY ANYONE?: NO

## 2024-04-10 SDOH — SOCIAL STABILITY: SOCIAL INSECURITY: HAVE YOU HAD THOUGHTS OF HARMING ANYONE ELSE?: NO

## 2024-04-10 SDOH — SOCIAL STABILITY: SOCIAL INSECURITY: HAS ANYONE EVER THREATENED TO HURT YOUR FAMILY OR YOUR PETS?: NO

## 2024-04-10 ASSESSMENT — LIFESTYLE VARIABLES
ORIENTATION AND CLOUDING OF SENSORIUM: ORIENTED AND CAN DO SERIAL ADDITIONS
PAROXYSMAL SWEATS: NO SWEAT VISIBLE
AGITATION: 3
TACTILE DISTURBANCES: MODERATE ITCHING, PINS AND NEEDLES, BURNING OR NUMBNESS
HOW OFTEN DO YOU HAVE A DRINK CONTAINING ALCOHOL: 2-4 TIMES A MONTH
HOW OFTEN DURING THE LAST YEAR HAVE YOU FAILED TO DO WHAT WAS NORMALLY EXPECTED FROM YOU BECAUSE OF DRINKING: LESS THAN MONTHLY
TACTILE DISTURBANCES: MILD ITCHING, PINS AND NEEDLES, BURNING OR NUMBNESS
HEADACHE, FULLNESS IN HEAD: VERY MILD
TREMOR: NO TREMOR
ANXIETY: NO ANXIETY, AT EASE
PAROXYSMAL SWEATS: NO SWEAT VISIBLE
ORIENTATION AND CLOUDING OF SENSORIUM: ORIENTED AND CAN DO SERIAL ADDITIONS
TOTAL SCORE: 2
HEADACHE, FULLNESS IN HEAD: VERY MILD
EVER HAD A DRINK FIRST THING IN THE MORNING TO STEADY YOUR NERVES TO GET RID OF A HANGOVER: NO
ANXIETY: NO ANXIETY, AT EASE
AUDIT TOTAL SCORE: 16
NAUSEA AND VOMITING: MILD NAUSEA WITH NO VOMITING
TOTAL SCORE: 2
TREMOR: 3
AUDITORY DISTURBANCES: NOT PRESENT
AGITATION: NORMAL ACTIVITY
VISUAL DISTURBANCES: NOT PRESENT
EVER FELT BAD OR GUILTY ABOUT YOUR DRINKING: YES
VISUAL DISTURBANCES: NOT PRESENT
TACTILE DISTURBANCES: MILD ITCHING, PINS AND NEEDLES, BURNING OR NUMBNESS
AGITATION: SOMEWHAT MORE THAN NORMAL ACTIVITY
HEADACHE, FULLNESS IN HEAD: VERY MILD
AUDITORY DISTURBANCES: VERY MILD HARSHNESS OR ABILITY TO FRIGHTEN
SUBSTANCE_ABUSE_PAST_12_MONTHS: NO
PAROXYSMAL SWEATS: NO SWEAT VISIBLE
HOW OFTEN DURING THE LAST YEAR HAVE YOU NEEDED AN ALCOHOLIC DRINK FIRST THING IN THE MORNING TO GET YOURSELF GOING AFTER A NIGHT OF HEAVY DRINKING: LESS THAN MONTHLY
AUDITORY DISTURBANCES: NOT PRESENT
BLOOD PRESSURE: 143/79
VISUAL DISTURBANCES: NOT PRESENT
PULSE: 113
SKIP TO QUESTIONS 9-10: 0
TACTILE DISTURBANCES: MODERATE ITCHING, PINS AND NEEDLES, BURNING OR NUMBNESS
VISUAL DISTURBANCES: NOT PRESENT
TOTAL SCORE: 11
TOTAL SCORE: 10
TREMOR: NO TREMOR
HOW OFTEN DURING THE LAST YEAR HAVE YOU FOUND THAT YOU WERE NOT ABLE TO STOP DRINKING ONCE YOU HAD STARTED: LESS THAN MONTHLY
TACTILE DISTURBANCES: MILD ITCHING, PINS AND NEEDLES, BURNING OR NUMBNESS
AUDIT-C TOTAL SCORE: 5
ANXIETY: MODERATELY ANXIOUS, OR GUARDED, SO ANXIETY IS INFERRED
AUDIT TOTAL SCORE: 21
HEADACHE, FULLNESS IN HEAD: NOT PRESENT
NAUSEA AND VOMITING: NO NAUSEA AND NO VOMITING
ORIENTATION AND CLOUDING OF SENSORIUM: ORIENTED AND CAN DO SERIAL ADDITIONS
TOTAL SCORE: 14
NAUSEA AND VOMITING: NO NAUSEA AND NO VOMITING
AGITATION: NORMAL ACTIVITY
AUDITORY DISTURBANCES: NOT PRESENT
PAROXYSMAL SWEATS: NO SWEAT VISIBLE
TREMOR: 2
ORIENTATION AND CLOUDING OF SENSORIUM: ORIENTED AND CAN DO SERIAL ADDITIONS
PAROXYSMAL SWEATS: NO SWEAT VISIBLE
AGITATION: SOMEWHAT MORE THAN NORMAL ACTIVITY
AUDITORY DISTURBANCES: NOT PRESENT
AUDIT-C TOTAL SCORE: 5
HEADACHE, FULLNESS IN HEAD: NOT PRESENT
HAVE YOU EVER FELT YOU SHOULD CUT DOWN ON YOUR DRINKING: YES
HOW MANY STANDARD DRINKS CONTAINING ALCOHOL DO YOU HAVE ON A TYPICAL DAY: 5 OR 6
HOW OFTEN DURING THE LAST YEAR HAVE YOU HAD A FEELING OF GUILT OR REMORSE AFTER DRINKING: DAILY OR ALMOST DAILY
TOTAL SCORE: 2
TREMOR: 2
HOW OFTEN DO YOU HAVE 6 OR MORE DRINKS ON ONE OCCASION: LESS THAN MONTHLY
NAUSEA AND VOMITING: NO NAUSEA AND NO VOMITING
NAUSEA AND VOMITING: NO NAUSEA AND NO VOMITING
HOW OFTEN DURING THE LAST YEAR HAVE YOU BEEN UNABLE TO REMEMBER WHAT HAPPENED THE NIGHT BEFORE BECAUSE YOU HAD BEEN DRINKING: LESS THAN MONTHLY
ANXIETY: 3
HAVE YOU OR SOMEONE ELSE BEEN INJURED AS A RESULT OF YOUR DRINKING: YES, DURING THE LAST YEAR
ORIENTATION AND CLOUDING OF SENSORIUM: ORIENTED AND CAN DO SERIAL ADDITIONS
VISUAL DISTURBANCES: NOT PRESENT
ANXIETY: 3
HAS A RELATIVE, FRIEND, DOCTOR, OR ANOTHER HEALTH PROFESSIONAL EXPRESSED CONCERN ABOUT YOUR DRINKING OR SUGGESTED YOU CUT DOWN: YES, DURING THE LAST YEAR
HAVE PEOPLE ANNOYED YOU BY CRITICIZING YOUR DRINKING: NO
PRESCIPTION_ABUSE_PAST_12_MONTHS: NO

## 2024-04-10 ASSESSMENT — COGNITIVE AND FUNCTIONAL STATUS - GENERAL
STANDING UP FROM CHAIR USING ARMS: A LITTLE
DAILY ACTIVITIY SCORE: 22
WALKING IN HOSPITAL ROOM: A LITTLE
MOBILITY SCORE: 18
CLIMB 3 TO 5 STEPS WITH RAILING: A LOT
MOVING TO AND FROM BED TO CHAIR: A LITTLE
HELP NEEDED FOR BATHING: A LITTLE
TURNING FROM BACK TO SIDE WHILE IN FLAT BAD: A LITTLE
TOILETING: A LITTLE
PATIENT BASELINE BEDBOUND: NO

## 2024-04-10 ASSESSMENT — ACTIVITIES OF DAILY LIVING (ADL)
GROOMING: INDEPENDENT
HEARING - LEFT EAR: FUNCTIONAL
FEEDING YOURSELF: INDEPENDENT
BATHING: NEEDS ASSISTANCE
JUDGMENT_ADEQUATE_SAFELY_COMPLETE_DAILY_ACTIVITIES: YES
WALKS IN HOME: NEEDS ASSISTANCE
HEARING - RIGHT EAR: FUNCTIONAL
TOILETING: NEEDS ASSISTANCE
LACK_OF_TRANSPORTATION: NO
PATIENT'S MEMORY ADEQUATE TO SAFELY COMPLETE DAILY ACTIVITIES?: YES
DRESSING YOURSELF: INDEPENDENT
ADEQUATE_TO_COMPLETE_ADL: YES

## 2024-04-10 ASSESSMENT — PAIN SCALES - GENERAL: PAINLEVEL_OUTOF10: 3

## 2024-04-10 ASSESSMENT — PATIENT HEALTH QUESTIONNAIRE - PHQ9
SUM OF ALL RESPONSES TO PHQ9 QUESTIONS 1 & 2: 0
2. FEELING DOWN, DEPRESSED OR HOPELESS: NOT AT ALL
1. LITTLE INTEREST OR PLEASURE IN DOING THINGS: NOT AT ALL

## 2024-04-10 ASSESSMENT — COLUMBIA-SUICIDE SEVERITY RATING SCALE - C-SSRS
1. IN THE PAST MONTH, HAVE YOU WISHED YOU WERE DEAD OR WISHED YOU COULD GO TO SLEEP AND NOT WAKE UP?: NO
2. HAVE YOU ACTUALLY HAD ANY THOUGHTS OF KILLING YOURSELF?: NO
6. HAVE YOU EVER DONE ANYTHING, STARTED TO DO ANYTHING, OR PREPARED TO DO ANYTHING TO END YOUR LIFE?: NO

## 2024-04-10 ASSESSMENT — PAIN DESCRIPTION - PROGRESSION: CLINICAL_PROGRESSION: NOT CHANGED

## 2024-04-10 ASSESSMENT — PAIN - FUNCTIONAL ASSESSMENT: PAIN_FUNCTIONAL_ASSESSMENT: 0-10

## 2024-04-10 NOTE — Clinical Note
[facility] in [facility] for the procedure with patient's [facility] RN at bedside throughout procedure. Handoff report given and patient remained in the same patient care room as previous to the procedure.

## 2024-04-10 NOTE — H&P
History Of Present Illness  Trey Borjas is a 63 y.o. male presenting with history of hypertension gout alcohol abuse smoking, complete heart block s/p pacemaker who presents for concerns after he began to drink again as well as burning in his feet.   He hasn't drank for 4 months and due to his neuropathy, he drank nearly half a bottle last night.  He did not take his evening gabapentin.   He also did not take his AM medications.  Upon evaluation in the ER,  he was noted to be in Aflutter with 's.    He received IV Mg, Metoprolol,  IV folic acid and IVF with LR.   He was noted to have a mild gap metabolic acidosis and corrected after initial treatment on repeat.   He denies any other current complaints including shortness of breath, chest pain, fevers/chills, abd pain, etc.   Referred to hospitalist service for his Aflutter and concern for potential etoh abuse. .     Past Medical History  He has a past medical history of Alcohol abuse with withdrawal, unspecified (CMS/[facility]), Personal history of other diseases of the circulatory system, Personal history of other diseases of the nervous system and sense organs (07/02/2020), Personal history of other diseases of urinary system, Post covid-19 condition, unspecified (12/16/2021), Strain of muscle and tendon of unspecified wall of thorax, initial encounter (09/27/2016), and Unspecified otitis externa, unspecified ear (08/11/2016).      Surgical History  He has a past surgical history that includes Other surgical history (07/17/2020) and Other surgical history (07/17/2020).       Social History  He reports that he has never smoked. He uses smokeless tobacco. He reports current alcohol use of about 56.0 standard drinks of alcohol per week. He reports that he does not use drugs.      Family History  No family history on file.       Allergies  Penicillins, Erythromycin, and Pollen extracts      Review of Systems   Constitutional:  Negative for chills, fatigue and  fever.   HENT:  Negative for congestion, ear pain, facial swelling, hearing loss and trouble swallowing.    Eyes:  Negative for photophobia, pain, redness and visual disturbance.   Respiratory:  Negative for cough, chest tightness, shortness of breath and wheezing.    Cardiovascular:  Negative for chest pain, palpitations and leg swelling.   Gastrointestinal:  Negative for abdominal distention, abdominal pain and nausea.   Endocrine: Negative for cold intolerance, heat intolerance, polydipsia and polyuria.   Genitourinary:  Negative for difficulty urinating, frequency and hematuria.   Skin:  Negative for color change, rash and wound.   Neurological:  Negative for dizziness, light-headedness, numbness and headaches.   Psychiatric/Behavioral:  Negative for agitation, confusion and suicidal ideas.        Physical Exam  GENERAL:   no distress, alert and cooperative  HEENT: Normal Inspection, Mucous membranes moist, No JVD, No Lymphadenopathy  CARDIOVASCULAR: RRR, no murmurs, 2+ equal pulses of the extremities, normal S1 and S 2  RESPIRATORY: Patent airways, CTAB, thorax symmetric, No significant wheezing, Rales or Rhonchi  ABDOMEN: Soft, Non-Tender, Normal Bowel Sounds, No Distention  SKIN: Warm and dry, no lesions, no rashes  EXTREMITIES: normal extremities, no significant cyanosis edema, contusions or wounds, no obsvious clubbing  NEURO: A&O x 3, CN II-XII grossly intact  PSYCH: Appropriate mood and behavior    Additional Physical Exam Notes/Findings      Last Recorded Vitals  /74   Pulse (!) 102   Temp 36.3 °C (97.3 °F) (Temporal)   Resp 14   Ht 1.829 m (6')   Wt 96.2 kg (212 lb)   SpO2 97%   BMI 28.75 kg/m²     Intake/Output last 3 Shifts:  No intake/output data recorded.      =========RELEVANT RESULTS ==========  Labs  Lab Results   Component Value Date    WBC 8.3 04/10/2024    HGB 12.6 (L) 04/10/2024    HCT 37.3 (L) 04/10/2024     04/10/2024     04/10/2024     Lab Results   Component  Value Date    GLUCOSE 137 (H) 04/10/2024    CALCIUM 8.9 04/10/2024     04/10/2024    K 3.4 (L) 04/10/2024    CO2 20 (L) 04/10/2024     04/10/2024    BUN 11 04/10/2024    CREATININE 0.69 04/10/2024      Lab Results   Component Value Date    ALT 40 04/10/2024     (H) 04/10/2024    ALKPHOS 213 (H) 04/10/2024    BILITOT 5.2 (H) 04/10/2024        Recent Echocardiogram (14D):   No echocardiogram results found for the past 14 days    TTE 12 month if available:  No echocardiogram results found for the past 12 months    Recent Imaging Results:  US gallbladder  Narrative: STUDY:  Right Upper Quadrant Ultrasound; 4/10/2024 11:19 AM  INDICATION:  Elevated LFT.  COMPARISON:  US RUQ 7/21/2020, CT A/P 11/22/2022.  ACCESSION NUMBER(S):  [id number]  ORDERING CLINICIAN:  JAKOB FELICIANO  TECHNIQUE:  Ultrasound of the Right Upper Quadrant.  FINDINGS:  LIVER:  The liver parenchyma is heterogeneous in echogenicity, coarsened in  echotexture, and there is nodularity to the contour.  There is no mass  identified.       GALLBLADDER:  The gallbladder contains multiple stones.  There is no pericholecystic  fluid or wall thickening.  Sonographic Smith's sign is negative.        BILE DUCTS:  The common bile duct measures 0.2 cm.  There is no intrahepatic  biliary dilatation.       PANCREAS:  The pancreas is not well seen due to overlying bowel gas.      RIGHT KIDNEY:  The right kidney measures 11.5 cm in length.  Renal cortical  echotexture is normal.  There is no hydronephrosis.  There are no  stones.  There is a simple cyst within the lower pole measuring 0.9 x  0.6 x 0.7 cm.  Impression: Cirrhotic changes within the liver.  Cholelithiasis.  No gallbladder wall thickening or biliary dilatation  is appreciated.   No significant change compared to prior imaging.  Signed by Huang Yuan MD          ======= SCHEDULED MEDICATIONS =======  Scheduled medications   Medication Dose Route Frequency    allopurinol  300 mg oral  Daily    enoxaparin  40 mg subcutaneous q24h    folic acid  1 mg oral Daily    gabapentin  300 mg oral Nightly    metoprolol tartrate  50 mg oral BID    multivitamin with minerals  1 tablet oral Daily    pantoprazole  40 mg oral Daily    tamsulosin  0.4 mg oral Daily    thiamine  100 mg oral Daily       ========== PRN MEDICATIONS =========  guaiFENesin, 600 mg, q12h PRN  LORazepam, 0.5 mg, q2h PRN   Or  LORazepam, 1 mg, q2h PRN   Or  LORazepam, 2 mg, q2h PRN  melatonin, 3 mg, Nightly PRN  polyethylene glycol, 17 g, Daily PRN        ==============  DIET  ==============  Dietary Orders (From admission, onward)       Start     Ordered    04/10/24 1551  Adult diet Regular  Diet effective now        Question:  Diet type  Answer:  Regular    04/10/24 1550                    ====== Assessment/Plan   =======    ASSESSMENT:  Principal Problem:    Atrial flutter, unspecified type (CMS/HCC)    ___________________________________________________    Atrial Flutter  Mild metabolic acidosis  Alcohol dependence  Etoh cirrhosis  Hypokalemia  Peripheral Neuropathy b/l feet      PLAN:  Gabapentin 300 mg at bedtime  Metoprolol 50 BID.   Allopurinol, Flomax.   CIWA Protocol.   Cardiology consult      DVT Prophylaxis  Will start therapeutic lovenox for now.     SUMMARY/DISPOSITION  Hemodynamically stable at this time.  Restart home meds.  Monitor for ETOH withdrawal.   Cardiology consulted.                Sergei Gillis,

## 2024-04-10 NOTE — PROGRESS NOTES
Pharmacy Medication History Review    Trey Borjas is a 63 y.o. male admitted for No Principal Problem: There is no principal problem currently on the Problem List. Please update the Problem List and refresh.. Pharmacy reviewed the patient's [id number] medications and allergies for accuracy.    The list below reflectives the updated PTA list. Please review each medication in order reconciliation for additional clarification and justification.  Prior to Admission medications    Medication Sig Start Date End Date Taking? Authorizing Provider   allopurinol (Zyloprim) 300 mg tablet TAKE 1 TABLET BY MOUTH EVERY DAY 12/26/23  Yes Tyrone Dick,    folic acid (Folvite) 1 mg tablet TAKE 1 TABLET BY MOUTH EVERY DAY AS DIRECTED 12/28/23  Yes Tyrone Dick DO   gabapentin (Neurontin) 300 mg capsule Take 1 capsule (300 mg) by mouth once daily at bedtime. 3/23/24 3/23/25 Yes Tyrone Dick DO   metoprolol tartrate (Lopressor) 50 mg tablet TAKE 1 TABLET TWICE A DAY 10/24/23  Yes Tyrone Dick, DO   multivitamin with minerals (multivit-min-iron fum-folic ac) tablet Take 1 tablet by mouth once daily.   Yes Historical Provider, MD   pantoprazole (ProtoNix) 40 mg EC tablet TAKE 1 TABLET BY MOUTH EVERY DAY 12/26/23  Yes Tyrone Dick DO   tamsulosin (Flomax) 0.4 mg 24 hr capsule TAKE 1 CAPSULE BY MOUTH EVERY DAY 12/26/23  Yes Tyrone Dick DO   thiamine 100 mg tablet TAKE 1 TABLET BY MOUTH EVERY DAY 4/2/24  Yes Leopoldo Shaver, DO        The list below reflectives the updated allergy list. Please review each documented allergy for additional clarification and justification.  Allergies  Reviewed by Pily Bonilla RN on 4/10/2024        Severity Reactions Comments    Penicillins Not Specified Unknown Childhood allergy    Erythromycin Low Hives, Itching     Pollen Extracts Low Itching             Below are additional concerns with the patient's PTA list.      Yessy Caban

## 2024-04-10 NOTE — ED PROVIDER NOTES
HPI   Chief Complaint   Patient presents with    Alcohol Intoxication       Past medical history that includes alcohol abuse as well as peripheral neuropathy presents with assistance with his alcohol use as well as bilateral leg pain.  Patient states that after 4 months of being sober, he began to drink last evening.  States that he began to drink secondary to pain in his legs.  States that the pain is consistent with his known neuropathy.  No known fall with head trauma.  Not on anticoagulation.  Largely has no other complaints.      History provided by:  Patient and EMS personnel   used: No                        No data recorded                   Patient History   Past Medical History:   Diagnosis Date    Alcohol abuse with withdrawal, unspecified (CMS/[facility])     Alcohol abuse with withdrawal    Personal history of other diseases of the circulatory system     History of complete atrioventricular block    Personal history of other diseases of the nervous system and sense organs 07/02/2020    History of impacted cerumen    Personal history of other diseases of urinary system     History of acute renal failure    Post covid-19 condition, unspecified 12/16/2021    Post-COVID-19 condition    Strain of muscle and tendon of unspecified wall of thorax, initial encounter 09/27/2016    Strain of thoracic region, initial encounter    Unspecified otitis externa, unspecified ear 08/11/2016    Otitis externa     Past Surgical History:   Procedure Laterality Date    OTHER SURGICAL HISTORY  07/17/2020    Inguinal hernia repair    OTHER SURGICAL HISTORY  07/17/2020    Ankle fracture repair     No family history on file.  Social History     Tobacco Use    Smoking status: Never    Smokeless tobacco: Current    Tobacco comments:     Patient vapes.   Substance Use Topics    Alcohol use: Yes     Alcohol/week: 56.0 standard drinks of alcohol     Types: 56 Shots of liquor per week    Drug use: Never       Physical Exam    ED Triage Vitals [04/10/24 1000]   Temperature Heart Rate Respirations BP   36.3 °C (97.3 °F) 85 14 133/73      Pulse Ox Temp Source Heart Rate Source Patient Position   95 % Temporal Monitor Sitting      BP Location FiO2 (%)     Right arm 21 %       Physical Exam  Vitals and nursing note reviewed.   HENT:      Head: Atraumatic.      Mouth/Throat:      Mouth: Mucous membranes are moist.   Eyes:      Conjunctiva/sclera: Conjunctivae normal.   Cardiovascular:      Rate and Rhythm: Tachycardia present. Rhythm irregular.      Comments: There is no pitting lower extremity edema or erythema.  Pulmonary:      Effort: Pulmonary effort is normal.      Breath sounds: Normal breath sounds.   Abdominal:      Palpations: Abdomen is soft.      Tenderness: There is no abdominal tenderness.   Musculoskeletal:         General: No deformity.      Cervical back: Normal range of motion.   Skin:     General: Skin is warm and dry.      Comments: Bilateral lower extremities are without erythema and calor.  Sensation is intact to light touch in the bilateral lower extremities.  No crepitus.  No pain with passive range of motion.   Neurological:      Mental Status: He is alert.      Comments: Bilateral lower extremities: 5/5 strength.  No clonus at the ankles.  Sensation tact light touch.  Patient can tell me when his toe was being pointed up or down bilaterally/proprioception appears to be intact.         ED Course & MDM   ED Course as of 04/10/24 1618   Wed Apr 10, 2024   1242 Noted to be in a wide-complex tachycardia with heart rates in the 110s.  Patient mentating.  No chest pain or shortness of breath.  Acceptable blood pressures.  Likely too slow to be ventricular tachycardia.  Will obtain ECG. [AB]   1249 Per chart review, patient does have a history of atrial flutter.  I wonder if this new wide-complex tachycardia is actually apparent a flutter.  Will reach out to cardiology. [AB]   1503 Spoke with Dr. Evans -- agrees that  rhythm is likely a variant atrial flutter.  Agrees with metoprolol. [AB]      ED Course User Index  [AB] Demarco Bolaños MD         Diagnoses as of 04/10/24 1618   Alcohol abuse   Atrial flutter, unspecified type (CMS/HCC)   Hypokalemia   High anion gap metabolic acidosis   Elevated liver enzymes       Medical Decision Making  History of alcohol abuse and neuropathy initially presented for assistance with his alcohol use as well as his usual neuropathic pain.    Initial ECG with a narrow complex rhythm consistent with a documented history of atrial flutter.  Patient cannot tell me what medications he is on for atrial flutter.  He is unsure if he is on anticoagulation.  Patient was later noted to be in a wide-complex tachycardic rhythm on telemetry.  Repeat ECG with a wide-complex tachycardia with a heart rate less than 120.  My suspicion at this time is for apparent atrial flutter, did reach out to cardiology prior to initiating beta-blocker therapy.  From a cardiovascular standpoint, I am unclear how long he has been in atrial flutter.  He is not familiar with his home medications.  I suspect his current dysrhythmia is not being positively impacted by his recent alcohol use as well as electrolyte abnormalities.  Given the lack of insight into his condition, I do think that he would benefit from escalation of care to hospitalization as well as cardiology consultation.    Patient also noted to have an anion gap metabolic acidosis.  I suspect this is secondary to alcohol/starvation ketosis.  This did correct with dextrose containing fluids after being given thiamine and folate.    Patient was also noted to have significantly elevated LFTs.  No obstructive process on ultrasound, was significant for cirrhosis.    Ultimately, will escalate care to hospitalization for further management.    Amount and/or Complexity of Data Reviewed  Independent Historian: EMS  Labs: ordered.  Radiology: ordered.  ECG/medicine tests:  ordered and independent interpretation performed.     Details: My interpretation of ECG #1: Narrow complex rhythm with a grossly regular RR interval, flutter waves appreciated in the lead II rhythm strip, no ST segment elevation, T wave inversions in V3 through V6 not seen on prior.  Suspect the latter may be secondary to electrolyte abnormality.    My interpretation of ECG #2: Pacer spikes noted.  Wide-complex tachycardia with a heart rate of 110, QTc 565, no ST segment elevation.  Concern for aberrant atrial flutter.  Lower concern for V. tach, given heart rate < 120.  Discussion of management or test interpretation with external provider(s): On-call cardiologist as well as the admitting hospitalist    Risk  Decision regarding hospitalization.        Procedure  Procedures     Demarco Bolaños MD  04/10/24 4465

## 2024-04-10 NOTE — CONSULTS
Cardiology Consult    Impression:  ETOH intoxication  P. Afib/flutter. CHADVASC 2  History of GI bleeding  AV block  s/p PPM  HTN  DLP  Neuropathy    Plan:  Eho done recently: LVH normal EF. No valvulopathy.  Currently has evidence of paroxysmal atrial fibrillation/flutter.  Flutter was probably triggered by alcohol intoxication.  Continue rate control strategy with metoprolol 50 twice daily.  No indication for antiarrhythmic.  Due to history of alcohol use and GI bleeding, hold off starting oral anticoagulation for now.  We can revisit this during outpatient visit and start oral anticoagulation if he is adherent with instructions.   Follow-up with Dr. PANCHAL in 2 to 3 weeks postdischarge      HPI:  63 y.o. male presenting with history of paroxysmal atrial flutter, high degree AVB  status post pacemaker in 2021, hypertension gout alcohol abuse smoking, who presents for concerns after he began to drink again as well as burning in his feet.   He hasn't drank for 4 months, relapsed last night and presented to the ER with chief complaint of lower extremity pain, paresthesia, intoxicated.  He did not take medications last 24 hours..  Upon evaluation in the ER,  he was noted to be in Aflutter with 's.    He received IV Mg, Metoprolol,  IV folic acid and IVF with LR.     He currently has paroxysmal atrial flutter on telemetry.  Denies chest pain shortness of breath palpitation lightheadedness.  He is not aware of arrhythmia.  His chief complaint is lower extremity paresthesia.    Meds:  Scheduled medications  allopurinol, 300 mg, oral, Daily  [START ON 4/11/2024] enoxaparin, 1 mg/kg, subcutaneous, BID  folic acid, 1 mg, oral, Daily  gabapentin, 300 mg, oral, Nightly  metoprolol tartrate, 50 mg, oral, BID  multivitamin with minerals, 1 tablet, oral, Daily  pantoprazole, 40 mg, oral, Daily  tamsulosin, 0.4 mg, oral, Daily  thiamine, 100 mg, oral, Daily      Continuous medications  dextrose 5 % and lactated Ringer's, 125  mL/hr, Last Rate: 125 mL/hr (04/10/24 1506)      PRN medications  PRN medications: guaiFENesin, LORazepam **OR** LORazepam **OR** LORazepam, melatonin, polyethylene glycol    PMHx:  Reviewed above  Social history:  Alcohol smoking  Family history:  nc  Review of systems:  10 systems reviewed and negative for details in HPI  Physical exam:  Vitals:    04/10/24 1520   BP:    Pulse: (!) 102   Resp: 14   Temp:    SpO2: 97%   Awake alert oriented not in acute distress regular rhythm and rate normal S1-S2 good bilateral air entry clear to auscultation soft bowel sounds positive peripheral pulse positive no lower extremity edema  EKG:  Paroxysmal atrial flutter  Echo:  No results found for this or any previous visit.   Labs:  Lab Results   Component Value Date    WBC 8.3 04/10/2024    HGB 12.6 (L) 04/10/2024    HCT 37.3 (L) 04/10/2024     04/10/2024    CHOL 179 09/24/2021    TRIG 138 09/24/2021    HDL 47.9 09/24/2021    ALT 40 04/10/2024     (H) 04/10/2024     04/10/2024    K 3.4 (L) 04/10/2024     04/10/2024    CREATININE 0.69 04/10/2024    BUN 11 04/10/2024    CO2 20 (L) 04/10/2024    TSH 2.91 09/24/2021    INR 1.1 05/04/2023     par

## 2024-04-10 NOTE — ED TRIAGE NOTES
PT BIBA FROM HOME FOR MULTIPLE MEDICAL COMPLAINTS. PT STATES HE WANTS HELP TO WITH ALCOHOL USE DISORDER. PT STATES HE HAS BEEN CLEAN FOR 4 MONTHS HOWEVER HE BEGAN DRINKING THIS PAST FRIDAY. HE HAD 4 SHOTS OF BLACK VELVET AT 4 AM. PT ALSO COMPLAINS OF NEUROPATHY PAIN IN BILATERAL LEGS IN WHICH HE HAS NOT HAD ANY SUCCESS IN MED CONTROL TO HELP WITH HIS PAIN. ON ARRIVAL TO ED, PT A&O X4. NEURO INTACT. DENIES CHEST PAIN, DENIES SOB.

## 2024-04-11 ENCOUNTER — APPOINTMENT (OUTPATIENT)
Dept: CARDIOLOGY | Facility: HOSPITAL | Age: 64
DRG: 308 | End: 2024-04-11
Payer: COMMERCIAL

## 2024-04-11 ENCOUNTER — APPOINTMENT (OUTPATIENT)
Dept: RADIOLOGY | Facility: HOSPITAL | Age: 64
DRG: 308 | End: 2024-04-11
Payer: COMMERCIAL

## 2024-04-11 PROBLEM — I48.92 ATRIAL FLUTTER, UNSPECIFIED TYPE (MULTI): Status: RESOLVED | Noted: 2024-04-10 | Resolved: 2024-04-11

## 2024-04-11 LAB
ALBUMIN SERPL BCP-MCNC: 2.8 G/DL (ref 3.4–5)
ALP SERPL-CCNC: 157 U/L (ref 33–136)
ALT SERPL W P-5'-P-CCNC: 33 U/L (ref 10–52)
ANION GAP SERPL CALC-SCNC: 12 MMOL/L (ref 10–20)
AST SERPL W P-5'-P-CCNC: 93 U/L (ref 9–39)
BILIRUB SERPL-MCNC: 5.5 MG/DL (ref 0–1.2)
BUN SERPL-MCNC: 12 MG/DL (ref 6–23)
CALCIUM SERPL-MCNC: 8.9 MG/DL (ref 8.6–10.3)
CHLORIDE SERPL-SCNC: 103 MMOL/L (ref 98–107)
CO2 SERPL-SCNC: 28 MMOL/L (ref 21–32)
CREAT SERPL-MCNC: 0.69 MG/DL (ref 0.5–1.3)
EGFRCR SERPLBLD CKD-EPI 2021: >90 ML/MIN/1.73M*2
ERYTHROCYTE [DISTWIDTH] IN BLOOD BY AUTOMATED COUNT: 15.9 % (ref 11.5–14.5)
GLUCOSE SERPL-MCNC: 129 MG/DL (ref 74–99)
HCT VFR BLD AUTO: 28.2 % (ref 41–52)
HGB BLD-MCNC: 9.5 G/DL (ref 13.5–17.5)
MCH RBC QN AUTO: 33.1 PG (ref 26–34)
MCHC RBC AUTO-ENTMCNC: 33.7 G/DL (ref 32–36)
MCV RBC AUTO: 98 FL (ref 80–100)
NRBC BLD-RTO: 0 /100 WBCS (ref 0–0)
P OFFSET: 117 MS
P ONSET: 97 MS
PLATELET # BLD AUTO: 87 X10*3/UL (ref 150–450)
POTASSIUM SERPL-SCNC: 3.5 MMOL/L (ref 3.5–5.3)
PROT SERPL-MCNC: 6.1 G/DL (ref 6.4–8.2)
Q ONSET: 220 MS
QRS COUNT: 15 BEATS
QRS DURATION: 104 MS
QT INTERVAL: 440 MS
QTC CALCULATION(BAZETT): 541 MS
QTC FREDERICIA: 506 MS
R AXIS: 68 DEGREES
RBC # BLD AUTO: 2.87 X10*6/UL (ref 4.5–5.9)
SODIUM SERPL-SCNC: 139 MMOL/L (ref 136–145)
T AXIS: 226 DEGREES
T OFFSET: 440 MS
VENTRICULAR RATE: 91 BPM
WBC # BLD AUTO: 5.8 X10*3/UL (ref 4.4–11.3)

## 2024-04-11 PROCEDURE — 71260 CT THORAX DX C+: CPT

## 2024-04-11 PROCEDURE — 2500000001 HC RX 250 WO HCPCS SELF ADMINISTERED DRUGS (ALT 637 FOR MEDICARE OP): Performed by: INTERNAL MEDICINE

## 2024-04-11 PROCEDURE — 71046 X-RAY EXAM CHEST 2 VIEWS: CPT

## 2024-04-11 PROCEDURE — 2500000004 HC RX 250 GENERAL PHARMACY W/ HCPCS (ALT 636 FOR OP/ED): Performed by: INTERNAL MEDICINE

## 2024-04-11 PROCEDURE — 93005 ELECTROCARDIOGRAM TRACING: CPT

## 2024-04-11 PROCEDURE — 96372 THER/PROPH/DIAG INJ SC/IM: CPT | Performed by: INTERNAL MEDICINE

## 2024-04-11 PROCEDURE — 2550000001 HC RX 255 CONTRASTS: Performed by: INTERNAL MEDICINE

## 2024-04-11 PROCEDURE — 99232 SBSQ HOSP IP/OBS MODERATE 35: CPT | Performed by: INTERNAL MEDICINE

## 2024-04-11 PROCEDURE — 36415 COLL VENOUS BLD VENIPUNCTURE: CPT | Performed by: INTERNAL MEDICINE

## 2024-04-11 PROCEDURE — G0378 HOSPITAL OBSERVATION PER HR: HCPCS

## 2024-04-11 PROCEDURE — 71260 CT THORAX DX C+: CPT | Performed by: RADIOLOGY

## 2024-04-11 PROCEDURE — 85027 COMPLETE CBC AUTOMATED: CPT | Performed by: INTERNAL MEDICINE

## 2024-04-11 PROCEDURE — 80053 COMPREHEN METABOLIC PANEL: CPT | Performed by: INTERNAL MEDICINE

## 2024-04-11 PROCEDURE — 71046 X-RAY EXAM CHEST 2 VIEWS: CPT | Performed by: RADIOLOGY

## 2024-04-11 PROCEDURE — 2500000002 HC RX 250 W HCPCS SELF ADMINISTERED DRUGS (ALT 637 FOR MEDICARE OP, ALT 636 FOR OP/ED): Performed by: INTERNAL MEDICINE

## 2024-04-11 RX ORDER — ENOXAPARIN SODIUM 100 MG/ML
40 INJECTION SUBCUTANEOUS DAILY
Status: DISCONTINUED | OUTPATIENT
Start: 2024-04-11 | End: 2024-04-13

## 2024-04-11 RX ORDER — GABAPENTIN 100 MG/1
100 CAPSULE ORAL 2 TIMES DAILY
Qty: 60 CAPSULE | Refills: 0 | Status: SHIPPED | OUTPATIENT
Start: 2024-04-11 | End: 2024-04-21 | Stop reason: HOSPADM

## 2024-04-11 RX ORDER — GABAPENTIN 100 MG/1
100 CAPSULE ORAL 2 TIMES DAILY
Status: DISCONTINUED | OUTPATIENT
Start: 2024-04-11 | End: 2024-04-13

## 2024-04-11 RX ADMIN — PANTOPRAZOLE SODIUM 40 MG: 40 TABLET, DELAYED RELEASE ORAL at 08:57

## 2024-04-11 RX ADMIN — LORAZEPAM 2 MG: 2 INJECTION INTRAMUSCULAR; INTRAVENOUS at 02:41

## 2024-04-11 RX ADMIN — IOHEXOL 75 ML: 350 INJECTION, SOLUTION INTRAVENOUS at 18:24

## 2024-04-11 RX ADMIN — ALLOPURINOL 300 MG: 300 TABLET ORAL at 08:57

## 2024-04-11 RX ADMIN — GABAPENTIN 100 MG: 100 CAPSULE ORAL at 13:45

## 2024-04-11 RX ADMIN — THIAMINE HCL TAB 100 MG 100 MG: 100 TAB at 08:57

## 2024-04-11 RX ADMIN — LORAZEPAM 2 MG: 2 INJECTION INTRAMUSCULAR; INTRAVENOUS at 00:41

## 2024-04-11 RX ADMIN — ENOXAPARIN SODIUM 40 MG: 40 INJECTION SUBCUTANEOUS at 08:56

## 2024-04-11 RX ADMIN — LORAZEPAM 1 MG: 2 INJECTION INTRAMUSCULAR; INTRAVENOUS at 18:05

## 2024-04-11 RX ADMIN — SODIUM CHLORIDE, SODIUM LACTATE, POTASSIUM CHLORIDE, CALCIUM CHLORIDE AND DEXTROSE MONOHYDRATE 125 ML/HR: 5; 600; 310; 30; 20 INJECTION, SOLUTION INTRAVENOUS at 02:41

## 2024-04-11 RX ADMIN — LORAZEPAM 1 MG: 2 INJECTION INTRAMUSCULAR; INTRAVENOUS at 04:55

## 2024-04-11 RX ADMIN — Medication 1 TABLET: at 08:57

## 2024-04-11 RX ADMIN — LORAZEPAM 2 MG: 2 INJECTION INTRAMUSCULAR; INTRAVENOUS at 23:22

## 2024-04-11 RX ADMIN — FOLIC ACID 1 MG: 1 TABLET ORAL at 08:57

## 2024-04-11 RX ADMIN — TAMSULOSIN HYDROCHLORIDE 0.4 MG: 0.4 CAPSULE ORAL at 08:57

## 2024-04-11 RX ADMIN — METOPROLOL TARTRATE 50 MG: 50 TABLET, FILM COATED ORAL at 08:57

## 2024-04-11 RX ADMIN — LORAZEPAM 2 MG: 2 INJECTION INTRAMUSCULAR; INTRAVENOUS at 20:31

## 2024-04-11 ASSESSMENT — LIFESTYLE VARIABLES
ORIENTATION AND CLOUDING OF SENSORIUM: ORIENTED AND CAN DO SERIAL ADDITIONS
AUDITORY DISTURBANCES: NOT PRESENT
VISUAL DISTURBANCES: NOT PRESENT
AGITATION: SOMEWHAT MORE THAN NORMAL ACTIVITY
TREMOR: NOT VISIBLE, BUT CAN BE FELT FINGERTIP TO FINGERTIP
TOTAL SCORE: 11
ANXIETY: MODERATELY ANXIOUS, OR GUARDED, SO ANXIETY IS INFERRED
AGITATION: NORMAL ACTIVITY
NAUSEA AND VOMITING: NO NAUSEA AND NO VOMITING
AGITATION: 2
PAROXYSMAL SWEATS: NO SWEAT VISIBLE
AGITATION: MODERATELY FIDGETY AND RESTLESS
ORIENTATION AND CLOUDING OF SENSORIUM: ORIENTED AND CAN DO SERIAL ADDITIONS
PAROXYSMAL SWEATS: BARELY PERCEPTIBLE SWEATING, PALMS MOIST
NAUSEA AND VOMITING: NO NAUSEA AND NO VOMITING
PULSE: 110
VISUAL DISTURBANCES: NOT PRESENT
VISUAL DISTURBANCES: NOT PRESENT
AGITATION: 2
PAROXYSMAL SWEATS: BARELY PERCEPTIBLE SWEATING, PALMS MOIST
TREMOR: NOT VISIBLE, BUT CAN BE FELT FINGERTIP TO FINGERTIP
PAROXYSMAL SWEATS: BARELY PERCEPTIBLE SWEATING, PALMS MOIST
TACTILE DISTURBANCES: MILD ITCHING, PINS AND NEEDLES, BURNING OR NUMBNESS
AUDITORY DISTURBANCES: MILD HARSHNESS OR ABILITY TO FRIGHTEN
AUDITORY DISTURBANCES: NOT PRESENT
VISUAL DISTURBANCES: NOT PRESENT
AGITATION: MODERATELY FIDGETY AND RESTLESS
TOTAL SCORE: 8
ANXIETY: MILDLY ANXIOUS
TACTILE DISTURBANCES: MODERATE ITCHING, PINS AND NEEDLES, BURNING OR NUMBNESS
HEADACHE, FULLNESS IN HEAD: NOT PRESENT
HEADACHE, FULLNESS IN HEAD: NOT PRESENT
ORIENTATION AND CLOUDING OF SENSORIUM: ORIENTED AND CAN DO SERIAL ADDITIONS
TREMOR: NOT VISIBLE, BUT CAN BE FELT FINGERTIP TO FINGERTIP
VISUAL DISTURBANCES: NOT PRESENT
TOTAL SCORE: 14
PAROXYSMAL SWEATS: BARELY PERCEPTIBLE SWEATING, PALMS MOIST
VISUAL DISTURBANCES: MILD SENSITIVITY
AGITATION: NORMAL ACTIVITY
TOTAL SCORE: 4
AUDITORY DISTURBANCES: NOT PRESENT
ORIENTATION AND CLOUDING OF SENSORIUM: DISORIENTED FOR DATA BY NO MORE THAN 2 CALENDAR DAYS
HEADACHE, FULLNESS IN HEAD: NOT PRESENT
PAROXYSMAL SWEATS: NO SWEAT VISIBLE
TOTAL SCORE: 5
HEADACHE, FULLNESS IN HEAD: NOT PRESENT
NAUSEA AND VOMITING: NO NAUSEA AND NO VOMITING
ANXIETY: 2
ANXIETY: MILDLY ANXIOUS
TACTILE DISTURBANCES: MILD ITCHING, PINS AND NEEDLES, BURNING OR NUMBNESS
HEADACHE, FULLNESS IN HEAD: VERY MILD
VISUAL DISTURBANCES: NOT PRESENT
ORIENTATION AND CLOUDING OF SENSORIUM: ORIENTED AND CAN DO SERIAL ADDITIONS
VISUAL DISTURBANCES: NOT PRESENT
AUDITORY DISTURBANCES: NOT PRESENT
AUDITORY DISTURBANCES: NOT PRESENT
HEADACHE, FULLNESS IN HEAD: NOT PRESENT
TOTAL SCORE: 4
NAUSEA AND VOMITING: NO NAUSEA AND NO VOMITING
TOTAL SCORE: 14
ANXIETY: MILDLY ANXIOUS
AGITATION: SOMEWHAT MORE THAN NORMAL ACTIVITY
TREMOR: NOT VISIBLE, BUT CAN BE FELT FINGERTIP TO FINGERTIP
TREMOR: NOT VISIBLE, BUT CAN BE FELT FINGERTIP TO FINGERTIP
ORIENTATION AND CLOUDING OF SENSORIUM: DISORIENTED FOR PLACE OR PERSON
TREMOR: 2
HEADACHE, FULLNESS IN HEAD: VERY MILD
TOTAL SCORE: 9
ANXIETY: MODERATELY ANXIOUS, OR GUARDED, SO ANXIETY IS INFERRED
NAUSEA AND VOMITING: NO NAUSEA AND NO VOMITING
AUDITORY DISTURBANCES: NOT PRESENT
TOTAL SCORE: 5
PAROXYSMAL SWEATS: BARELY PERCEPTIBLE SWEATING, PALMS MOIST
TACTILE DISTURBANCES: MILD ITCHING, PINS AND NEEDLES, BURNING OR NUMBNESS
AGITATION: SOMEWHAT MORE THAN NORMAL ACTIVITY
TREMOR: 2
AGITATION: 2
VISUAL DISTURBANCES: NOT PRESENT
HEADACHE, FULLNESS IN HEAD: NOT PRESENT
AUDITORY DISTURBANCES: NOT PRESENT
NAUSEA AND VOMITING: NO NAUSEA AND NO VOMITING
PAROXYSMAL SWEATS: NO SWEAT VISIBLE
ANXIETY: 2
AUDITORY DISTURBANCES: NOT PRESENT
NAUSEA AND VOMITING: NO NAUSEA AND NO VOMITING
TREMOR: NOT VISIBLE, BUT CAN BE FELT FINGERTIP TO FINGERTIP
PAROXYSMAL SWEATS: NO SWEAT VISIBLE
TREMOR: NOT VISIBLE, BUT CAN BE FELT FINGERTIP TO FINGERTIP
NAUSEA AND VOMITING: NO NAUSEA AND NO VOMITING
HEADACHE, FULLNESS IN HEAD: NOT PRESENT
TREMOR: NOT VISIBLE, BUT CAN BE FELT FINGERTIP TO FINGERTIP
ANXIETY: 5
PAROXYSMAL SWEATS: BARELY PERCEPTIBLE SWEATING, PALMS MOIST
HEADACHE, FULLNESS IN HEAD: VERY MILD
TOTAL SCORE: 5
ANXIETY: MILDLY ANXIOUS
NAUSEA AND VOMITING: NO NAUSEA AND NO VOMITING
TOTAL SCORE: 14
PAROXYSMAL SWEATS: BARELY PERCEPTIBLE SWEATING, PALMS MOIST
AGITATION: 2
VISUAL DISTURBANCES: NOT PRESENT
TREMOR: NOT VISIBLE, BUT CAN BE FELT FINGERTIP TO FINGERTIP
AUDITORY DISTURBANCES: MILD HARSHNESS OR ABILITY TO FRIGHTEN
AGITATION: SOMEWHAT MORE THAN NORMAL ACTIVITY
TOTAL SCORE: 8
ORIENTATION AND CLOUDING OF SENSORIUM: ORIENTED AND CAN DO SERIAL ADDITIONS
ANXIETY: MILDLY ANXIOUS
HEADACHE, FULLNESS IN HEAD: VERY MILD
VISUAL DISTURBANCES: NOT PRESENT
ORIENTATION AND CLOUDING OF SENSORIUM: DISORIENTED FOR DATA BY NO MORE THAN 2 CALENDAR DAYS
ANXIETY: 2
NAUSEA AND VOMITING: NO NAUSEA AND NO VOMITING
VISUAL DISTURBANCES: VERY MILD SENSITIVITY
TREMOR: NO TREMOR
BLOOD PRESSURE: 121/61
AUDITORY DISTURBANCES: NOT PRESENT
ORIENTATION AND CLOUDING OF SENSORIUM: ORIENTED AND CAN DO SERIAL ADDITIONS
AUDITORY DISTURBANCES: NOT PRESENT
NAUSEA AND VOMITING: NO NAUSEA AND NO VOMITING
ORIENTATION AND CLOUDING OF SENSORIUM: DISORIENTED FOR PLACE OR PERSON
NAUSEA AND VOMITING: NO NAUSEA AND NO VOMITING
ANXIETY: MILDLY ANXIOUS
ORIENTATION AND CLOUDING OF SENSORIUM: ORIENTED AND CAN DO SERIAL ADDITIONS
PAROXYSMAL SWEATS: BARELY PERCEPTIBLE SWEATING, PALMS MOIST
TACTILE DISTURBANCES: MODERATE ITCHING, PINS AND NEEDLES, BURNING OR NUMBNESS
ORIENTATION AND CLOUDING OF SENSORIUM: ORIENTED AND CAN DO SERIAL ADDITIONS
HEADACHE, FULLNESS IN HEAD: NOT PRESENT

## 2024-04-11 ASSESSMENT — COGNITIVE AND FUNCTIONAL STATUS - GENERAL
DAILY ACTIVITIY SCORE: 22
MOVING TO AND FROM BED TO CHAIR: A LITTLE
DRESSING REGULAR LOWER BODY CLOTHING: A LITTLE
DRESSING REGULAR UPPER BODY CLOTHING: A LITTLE
MOBILITY SCORE: 18
STANDING UP FROM CHAIR USING ARMS: A LITTLE
CLIMB 3 TO 5 STEPS WITH RAILING: A LOT
TURNING FROM BACK TO SIDE WHILE IN FLAT BAD: A LITTLE
WALKING IN HOSPITAL ROOM: A LITTLE

## 2024-04-11 ASSESSMENT — PAIN SCALES - GENERAL: PAINLEVEL_OUTOF10: 0 - NO PAIN

## 2024-04-11 NOTE — DISCHARGE SUMMARY
Discharge Diagnosis  S/p Cardiac arrest (pVtach/ torsades)  PAF/ a flutter, currently in SR  Acute SDH, stable  Acute metabolic encephalopathy, possible hepatic/ wernicke encephalopathy, resolved  Acute respiratory failure 2/2 cardiac arrest, resolved  Acute gastritis   Hepatic cirrhosis  Transaminitis    Issues Requiring Follow-Up  As above  PCP, Cardiology, GI, Neurosurgery    Discharge Meds     Your medication list        START taking these medications        Instructions Last Dose Given Next Dose Due   diclofenac sodium 1 % gel  Commonly known as: Voltaren      Apply 4.5 inches (4 g) topically 2 times a day for 14 days.       lactulose 20 gram/30 mL oral solution  Start taking on: April 23, 2024      Take 30 mL (20 g) by mouth once daily.       levETIRAcetam 500 mg tablet  Commonly known as: Keppra      Take 1 tablet (500 mg) by mouth 2 times a day.       magnesium oxide 400 mg (241.3 mg magnesium) tablet  Commonly known as: Mag-Ox  Start taking on: April 23, 2024      Take 1 tablet (400 mg) by mouth once daily.              CHANGE how you take these medications        Instructions Last Dose Given Next Dose Due   gabapentin 100 mg capsule  Commonly known as: Neurontin  What changed:   medication strength  how much to take  when to take this      Take 2 capsules (200 mg) by mouth 3 times a day.       metoprolol tartrate 25 mg tablet  Commonly known as: Lopressor  What changed:   medication strength  how much to take      Take 0.5 tablets (12.5 mg) by mouth 2 times a day.              CONTINUE taking these medications        Instructions Last Dose Given Next Dose Due   allopurinol 300 mg tablet  Commonly known as: Zyloprim      TAKE 1 TABLET BY MOUTH EVERY DAY       folic acid 1 mg tablet  Commonly known as: Folvite      TAKE 1 TABLET BY MOUTH EVERY DAY AS DIRECTED       multivitamin with minerals tablet           pantoprazole 40 mg EC tablet  Commonly known as: ProtoNix      TAKE 1 TABLET BY MOUTH EVERY DAY        tamsulosin 0.4 mg 24 hr capsule  Commonly known as: Flomax      TAKE 1 CAPSULE BY MOUTH EVERY DAY       thiamine 100 mg tablet  Commonly known as: Vitamin B-1      TAKE 1 TABLET BY MOUTH EVERY DAY                 Where to Get Your Medications        These medications were sent to [id number] - [place], [place] - [address] [address]  [address] [address] [address]      Phone: [phone]   diclofenac sodium 1 % gel  gabapentin 100 mg capsule  lactulose 20 gram/30 mL oral solution  levETIRAcetam 500 mg tablet  magnesium oxide 400 mg (241.3 mg magnesium) tablet  metoprolol tartrate 25 mg tablet         Test Results Pending At Discharge  Pending Labs       No current pending labs.            Hospital Course  62 year old Male with history of hypertension gout alcohol abuse smoking, complete heart block s/p pacemaker who presents for concerns after he began to drink again as well as burning in his feet.   He hasn't drank for 4 months and due to his neuropathy, he drank nearly half a bottle last night.  He did not take his evening gabapentin.   He also did not take his AM medications.  Upon evaluation in the ER,  he was noted to be in Aflutter with 's.    He received IV Mg, Metoprolol,  IV folic acid and IVF with LR.   He was noted to have a mild gap metabolic acidosis and corrected after initial treatment on repeat.   He denies any other current complaints including shortness of breath, chest pain, fevers/chills, abd pain, etc.   Referred to hospitalist service for his Aflutter and concern for potential etoh abuse.   Cardiology consulted.   Suspect that his symptoms and presenting findings were likely due to ETOH intoxication and missing his home dose medications.    His HR improved with resuming home dose metoprolol.  Advised against full A/C for now with h/o GIB but will follow up with cardiology for reassessments regarding this.   He has remained hemodynamically stable  and back to baseline medical condition.  His H&H dropped but no signs of bleeding.  Likely dilutional and initially heme concentrated.   He denies feeling any symptoms of withdrawal for which he is familiar with.   Cardiology has cleared for discharge.   He has a h/o of NAE but declines CPAP at this time.  Will follow up with his PCP and cardiology closely and would like to go home.    CT chest completed with findings reviewed with the patient and will need repeat in the future for monitoring of ascending aortic aneurysm.     Patient was supposed to be discharged but developed worsening alcohol withdrawal symptoms & hyoxia and remained hospitalized. On 4/12/24, code blue called as patient became unresponsive and pulseless, tele showed sustained V tach, ROSC was achieved after 1 round of CPR/ epi, patient was intubated and transferred to ICU. Head CT showed posterior right cerebellar subdural hematoma and neurosurgery consulted, recommended conservative management with repeat head CT (stable), keppra for seizure prophylaxis and monitoring BP, platelet count and INR with specific parameters. Patient noted to have blood in OG tube and received PPI & octreotide. GI consulted and performed EGD on 4/15 showing 1 small varix in esophagus, gastritis without any active bleeding. Patient received 1 unit pRBCs and 1 unit platelets total this admission. He was extubated on 4/15 and eventually weaned to room air. Cardiology adjusted BB, pt noted to be in a flutter after his arrest but converted to SR, AC held in the setting of alcoholism and noncompliance, pt is to follow up with cards as he may be a candidate for LAAO/ Watchman device. Patient started on lactulose for hepatic encephalopathy. His gabapentin dose inc for neuropathy. He was evaluated by PT/OT. He improved significantly during his hospitalization. Specialists cleared pt for dc. Initial plan was dc to SNF but while waiting for insurance process, patient became  stronger and was able to be discharged home with [facility]. He was strongly advised to avoid any alcohol use. He is to follow up with PCP, GI, cardiology, neurosurgery (with repeat head CT in 2 weeks). Prescriptions for keppra, magnesium, gabapentin, voltaren, lactulose and metoprolol sent.     Discharge time spent 35 minutes.      Pertinent Physical Exam At Time of Discharge    GENERAL:   no distress, alert and cooperative  HEENT: Normal Inspection, Mucous membranes moist, No JVD, No Lymphadenopathy  CARDIOVASCULAR: RRR, no murmurs, 2+ equal pulses of the extremities, normal S1 and S 2  RESPIRATORY: Patent airways, CTAB, normal breath sounds with good chest expansion, thorax symmetric, No Wheezes, Rales or Rhonchi  ABDOMEN: Soft, Non-Tender, Normal Bowel Sounds, No Distention  SKIN: Warm and dry, no lesions, no rashes  EXTREMITIES: normal extremities, no cyanosis edema, contusions or wounds, no clubbing  NEURO: A&O x 3, CN II-XII grossly intact  PSYCH: Appropriate mood and behavior      Outpatient Follow-Up  Future Appointments   Date Time Provider Department Center   4/25/2024  2:00 PM Colin Alaniz OT [facility]   4/30/2024 12:00 PM Tyrone Dick DO [id number] [facility]   5/2/2024  9:45 AM Dc Nair MD [id number] [facility]   5/7/2024  9:15 AM PAR CT 1 [facility]   5/22/2024  1:00 PM Tresa Gagnon, APRN-CNP [id number] [facility]         Sergei Gillis DO         Addendum: Martina Becker DO

## 2024-04-11 NOTE — PROGRESS NOTES
Cardiology Progress    Impression:  ETOH intoxication  P. Afib/flutter. CHADVASC 2.  Now sinus rhythm.  History of GI bleeding.  Anemia.  AV block  s/p PPM  HTN  DLP  Neuropathy  Plan:  Continue beta-blockers as ordered  Follow-up in the cardiology office 2 to 3 weeks.  Will initiate anticoagulation if he is able to stay sober and keep follow-up.  HPI:  No problems overnight.  Still somewhat confused and disoriented.  Converted to sinus rhythm.  Meds:  Scheduled medications  allopurinol, 300 mg, oral, Daily  enoxaparin, 40 mg, subcutaneous, Daily  folic acid, 1 mg, oral, Daily  gabapentin, 100 mg, oral, BID  gabapentin, 300 mg, oral, Nightly  metoprolol tartrate, 50 mg, oral, BID  multivitamin with minerals, 1 tablet, oral, Daily  pantoprazole, 40 mg, oral, Daily  tamsulosin, 0.4 mg, oral, Daily  thiamine, 100 mg, oral, Daily      Continuous medications  dextrose 5 % and lactated Ringer's, 125 mL/hr, Last Rate: 125 mL/hr (04/11/24 0241)      PRN medications  PRN medications: guaiFENesin, LORazepam **OR** LORazepam **OR** LORazepam, melatonin, polyethylene glycol    Physical exam:  Vitals:    04/11/24 0605   BP: 116/70   Pulse: 80   Resp:    Temp: 36.3 °C (97.3 °F)   SpO2: (!) 89%      No JVD.  Chest clear.  No edema.  EKG:  Telemetry shows sinus rhythm.  Echo:  2022: Normal EF.  Labs:  Lab Results   Component Value Date    WBC 5.8 04/11/2024    HGB 9.5 (L) 04/11/2024    HCT 28.2 (L) 04/11/2024    PLT 87 (L) 04/11/2024    CHOL 179 09/24/2021    TRIG 138 09/24/2021    HDL 47.9 09/24/2021    ALT 33 04/11/2024    AST 93 (H) 04/11/2024     04/11/2024    K 3.5 04/11/2024     04/11/2024    CREATININE 0.69 04/11/2024    BUN 12 04/11/2024    CO2 28 04/11/2024    TSH 2.91 09/24/2021    INR 1.1 05/04/2023     par

## 2024-04-11 NOTE — PROGRESS NOTES
Trey Borjas is a 63 y.o. male on day 0 of admission presenting with Atrial flutter, unspecified type (CMS/HCC).        Subjective   Seen this AM.  CXR this AM with abnormalities of trachea.  Ct recommended.  Also unsteady on his feet.  PT/OT added       Objective     Last Recorded Vitals  /73 (BP Location: Left arm, Patient Position: Sitting)   Pulse 87   Temp 36.2 °C (97.2 °F) (Temporal)   Resp 22   Ht 1.829 m (6')   Wt 96.2 kg (212 lb)   SpO2 93%   BMI 28.75 kg/m²     Intake/Output last 3 Shifts:  I/O last 3 completed shifts:  In: 1850 (19.2 mL/kg) [I.V.:1800 (18.7 mL/kg); IV Piggyback:50]  Out: 200 (2.1 mL/kg) [Urine:200 (0.1 mL/kg/hr)]  Weight: 96.2 kg       =========RELEVANT RESULTS ==========  Labs  Lab Results   Component Value Date    WBC 5.8 04/11/2024    HGB 9.5 (L) 04/11/2024    HCT 28.2 (L) 04/11/2024    MCV 98 04/11/2024    PLT 87 (L) 04/11/2024     Lab Results   Component Value Date    GLUCOSE 129 (H) 04/11/2024    CALCIUM 8.9 04/11/2024     04/11/2024    K 3.5 04/11/2024    CO2 28 04/11/2024     04/11/2024    BUN 12 04/11/2024    CREATININE 0.69 04/11/2024      Lab Results   Component Value Date    ALT 33 04/11/2024    AST 93 (H) 04/11/2024    ALKPHOS 157 (H) 04/11/2024    BILITOT 5.5 (H) 04/11/2024        Recent Echocardiogram (14D):   No echocardiogram results found for the past 14 days    TTE 12 month if available:  No echocardiogram results found for the past 12 months    Recent Imaging Results:  ECG 12 lead  Accelerated Junctional rhythm  Incomplete right bundle branch block  ST & Marked T wave abnormality, consider anterolateral ischemia  Prolonged QT  Abnormal ECG  When compared with ECG of 22-NOV-2022 14:22,  PREVIOUS ECG IS PRESENT  XR chest 2 views  Narrative: Interpreted By:  Milton Munson,   STUDY:  XR CHEST 2 VIEWS; 4/11/2024 10:19 am      INDICATION:  Signs/Symptoms:Mild hypoxia overnight..      COMPARISON:  11/24/2022      ACCESSION  NUMBER(S):  [id number]      ORDERING CLINICIAN:  LENIN KRAMER      TECHNIQUE:  AP and lateral views of the chest were obtained.      FINDINGS:  The cardiac silhouette is normal in appearance. A bipolar cardiac  pacemaker is present.  Trachea is effaced in deviated towards the  right for distance of approximately 8 cm suggesting a mediastinal  mass. Lateral view is limited by motion artifact but raises concern  for possible pulmonary nodules, difficult to appreciate on the  frontal view. No alveolar consolidation is noted. No effusions are  identified.      Impression: 1. Limited exam as above.  2. CT chest with intravenous contrast is recommended however to rule  out a mediastinal mass effacing the trachea as well as to exclude the  possibility pulmonary nodules suggested on the lateral view.      Urgent message sent by secure chat.      MACRO:  none      Signed by: Milton Munson 4/11/2024 10:56 AM  Dictation workstation:   [id number]      Exam     GENERAL:   no distress, alert and cooperative  HEENT: Normal Inspection, Mucous membranes moist, No JVD, No Lymphadenopathy  CARDIOVASCULAR: RRR, no murmurs, 2+ equal pulses of the extremities, normal S1 and S 2  RESPIRATORY: Patent airways, CTAB, thorax symmetric, No significant wheezing, Rales or Rhonchi  ABDOMEN: Soft, Non-Tender, Normal Bowel Sounds, No Distention  SKIN: Warm and dry, no lesions, no rashes  EXTREMITIES: normal extremities, no significant cyanosis edema, contusions or wounds, no obsvious clubbing  NEURO: A&O x 3, CN II-XII grossly intact  PSYCH: Appropriate mood and behavior    Additional Physical Exam Notes/Findings        ======= SCHEDULED MEDICATIONS =======  Scheduled medications   Medication Dose Route Frequency    allopurinol  300 mg oral Daily    enoxaparin  40 mg subcutaneous Daily    folic acid  1 mg oral Daily    gabapentin  100 mg oral BID    gabapentin  300 mg oral Nightly    metoprolol tartrate  50 mg oral BID    multivitamin with  minerals  1 tablet oral Daily    pantoprazole  40 mg oral Daily    tamsulosin  0.4 mg oral Daily    thiamine  100 mg oral Daily       ========== PRN MEDICATIONS =========  guaiFENesin, 600 mg, q12h PRN  LORazepam, 0.5 mg, q2h PRN   Or  LORazepam, 1 mg, q2h PRN   Or  LORazepam, 2 mg, q2h PRN  melatonin, 3 mg, Nightly PRN  polyethylene glycol, 17 g, Daily PRN        ==============  DIET  ==============  Dietary Orders (From admission, onward)       Start     Ordered    04/10/24 1551  Adult diet Regular  Diet effective now        Question:  Diet type  Answer:  Regular    04/10/24 1550                    ====== Assessment/Plan   =======    ASSESSMENT:  Active Problems:  There are no active Hospital Problems.    ___________________________________________________    Atrial Flutter  Mild metabolic acidosis  Alcohol dependence  Etoh cirrhosis h/o GIB  Hypokalemia  Peripheral Neuropathy b/l feet      PLAN:  Gabapentin 300 mg at bedtime.   Will add 100mg BID during day as his neuropathy has been persistent.   Metoprolol 50 BID.  continue  Allopurinol, Flomax.   CIWA Protocol.   Hypoxia overnight.  H/o NAE.   CPAP at bedtime.     Will order CXR to ensure no other acute process.   CT ordered for abnormal CXR findings.    PT/OT added.       DVT Prophylaxis  Subcutaneous lovenox                   Sergei Gillis DO

## 2024-04-11 NOTE — CARE PLAN
The patient's goals for the shift include decreased pain.     The clinical goals for the shift include Patient's pain will decrease by end of shift.    Over the shift, the patient did not make progress toward the following goals. Barriers to progression include pt continues to have pain. Recommendations to address these barriers include CIWA scale and Ativan prn.

## 2024-04-11 NOTE — HOSPITAL COURSE
62 year old Male with history of hypertension gout alcohol abuse smoking, complete heart block s/p pacemaker who presents for concerns after he began to drink again as well as burning in his feet.   He hasn't drank for 4 months and due to his neuropathy, he drank nearly half a bottle last night.  He did not take his evening gabapentin.   He also did not take his AM medications.  Upon evaluation in the ER,  he was noted to be in Aflutter with 's.    He received IV Mg, Metoprolol,  IV folic acid and IVF with LR.   He was noted to have a mild gap metabolic acidosis and corrected after initial treatment on repeat.   He denies any other current complaints including shortness of breath, chest pain, fevers/chills, abd pain, etc.   Referred to hospitalist service for his Aflutter and concern for potential etoh abuse.   Cardiology consulted.   Suspect that his symptoms and presenting findings were likely due to ETOH intoxication and missing his home dose medications.    His HR improved with resuming home dose metoprolol.  Advised against full A/C for now with h/o GIB but will follow up with cardiology for reassessments regarding this.   He has remained hemodynamically stable and back to baseline medical condition.  His H&H dropped but no signs of bleeding.  Likely dilutional and initially heme concentrated.   He denies feeling any symptoms of withdrawal for which he is familiar with.   Cardiology has cleared for discharge.   He has a h/o of NAE but declines CPAP at this time.  Will follow up with his PCP and cardiology closely and would like to go home.    CT chest completed with findings reviewed with the patient and will need repeat in the future for monitoring of ascending aortic aneurysm.

## 2024-04-12 ENCOUNTER — APPOINTMENT (OUTPATIENT)
Dept: RADIOLOGY | Facility: HOSPITAL | Age: 64
DRG: 308 | End: 2024-04-12
Payer: COMMERCIAL

## 2024-04-12 ENCOUNTER — APPOINTMENT (OUTPATIENT)
Dept: CARDIOLOGY | Facility: HOSPITAL | Age: 64
DRG: 308 | End: 2024-04-12
Payer: COMMERCIAL

## 2024-04-12 PROBLEM — I48.92 ATRIAL FLUTTER, UNSPECIFIED TYPE (MULTI): Status: ACTIVE | Noted: 2024-04-12

## 2024-04-12 LAB
ALBUMIN SERPL BCP-MCNC: 2.5 G/DL (ref 3.4–5)
ALBUMIN SERPL BCP-MCNC: 2.9 G/DL (ref 3.4–5)
ALBUMIN SERPL BCP-MCNC: 3 G/DL (ref 3.4–5)
ALP SERPL-CCNC: 138 U/L (ref 33–136)
ALP SERPL-CCNC: 161 U/L (ref 33–136)
ALP SERPL-CCNC: 163 U/L (ref 33–136)
ALP SERPL-CCNC: 165 U/L (ref 33–136)
ALP SERPL-CCNC: 165 U/L (ref 33–136)
ALT SERPL W P-5'-P-CCNC: 40 U/L (ref 10–52)
ALT SERPL W P-5'-P-CCNC: 41 U/L (ref 10–52)
ALT SERPL W P-5'-P-CCNC: 44 U/L (ref 10–52)
ALT SERPL W P-5'-P-CCNC: 46 U/L (ref 10–52)
ALT SERPL W P-5'-P-CCNC: 49 U/L (ref 10–52)
AMMONIA PLAS-SCNC: 72 UMOL/L (ref 16–53)
ANION GAP BLDA CALCULATED.4IONS-SCNC: 13 MMO/L (ref 10–25)
ANION GAP SERPL CALC-SCNC: 11 MMOL/L (ref 10–20)
ANION GAP SERPL CALC-SCNC: 12 MMOL/L (ref 10–20)
ANION GAP SERPL CALC-SCNC: 14 MMOL/L (ref 10–20)
ANION GAP SERPL CALC-SCNC: 14 MMOL/L (ref 10–20)
ANION GAP SERPL CALC-SCNC: 17 MMOL/L (ref 10–20)
ARTERIAL PATENCY WRIST A: POSITIVE
AST SERPL W P-5'-P-CCNC: 147 U/L (ref 9–39)
AST SERPL W P-5'-P-CCNC: 149 U/L (ref 9–39)
AST SERPL W P-5'-P-CCNC: 157 U/L (ref 9–39)
AST SERPL W P-5'-P-CCNC: 173 U/L (ref 9–39)
AST SERPL W P-5'-P-CCNC: 177 U/L (ref 9–39)
BASE EXCESS BLDA CALC-SCNC: 3.3 MMOL/L (ref -2–3)
BASOPHILS # BLD AUTO: 0.04 X10*3/UL (ref 0–0.1)
BASOPHILS # BLD AUTO: 0.04 X10*3/UL (ref 0–0.1)
BASOPHILS # BLD AUTO: 0.06 X10*3/UL (ref 0–0.1)
BASOPHILS NFR BLD AUTO: 0.4 %
BILIRUB SERPL-MCNC: 6.1 MG/DL (ref 0–1.2)
BILIRUB SERPL-MCNC: 6.9 MG/DL (ref 0–1.2)
BILIRUB SERPL-MCNC: 7.3 MG/DL (ref 0–1.2)
BILIRUB SERPL-MCNC: 7.3 MG/DL (ref 0–1.2)
BILIRUB SERPL-MCNC: 7.5 MG/DL (ref 0–1.2)
BODY TEMPERATURE: 37 DEGREES CELSIUS
BUN SERPL-MCNC: 10 MG/DL (ref 6–23)
BUN SERPL-MCNC: 12 MG/DL (ref 6–23)
BUN SERPL-MCNC: 13 MG/DL (ref 6–23)
BUN SERPL-MCNC: 9 MG/DL (ref 6–23)
BUN SERPL-MCNC: 9 MG/DL (ref 6–23)
CA-I BLDA-SCNC: 1.24 MMOL/L (ref 1.1–1.33)
CALCIUM SERPL-MCNC: 8.7 MG/DL (ref 8.6–10.3)
CALCIUM SERPL-MCNC: 9.3 MG/DL (ref 8.6–10.3)
CALCIUM SERPL-MCNC: 9.3 MG/DL (ref 8.6–10.3)
CALCIUM SERPL-MCNC: 9.5 MG/DL (ref 8.6–10.3)
CALCIUM SERPL-MCNC: 9.7 MG/DL (ref 8.6–10.3)
CARDIAC TROPONIN I PNL SERPL HS: 118 NG/L (ref 0–20)
CARDIAC TROPONIN I PNL SERPL HS: 36 NG/L (ref 0–20)
CARDIAC TROPONIN I PNL SERPL HS: 47 NG/L (ref 0–20)
CHLORIDE BLDA-SCNC: 101 MMOL/L (ref 98–107)
CHLORIDE SERPL-SCNC: 104 MMOL/L (ref 98–107)
CHLORIDE SERPL-SCNC: 106 MMOL/L (ref 98–107)
CHLORIDE SERPL-SCNC: 106 MMOL/L (ref 98–107)
CO2 SERPL-SCNC: 23 MMOL/L (ref 21–32)
CO2 SERPL-SCNC: 25 MMOL/L (ref 21–32)
CO2 SERPL-SCNC: 26 MMOL/L (ref 21–32)
CO2 SERPL-SCNC: 27 MMOL/L (ref 21–32)
CO2 SERPL-SCNC: 27 MMOL/L (ref 21–32)
CREAT SERPL-MCNC: 0.76 MG/DL (ref 0.5–1.3)
CREAT SERPL-MCNC: 0.76 MG/DL (ref 0.5–1.3)
CREAT SERPL-MCNC: 0.77 MG/DL (ref 0.5–1.3)
CREAT SERPL-MCNC: 0.9 MG/DL (ref 0.5–1.3)
CREAT SERPL-MCNC: 0.98 MG/DL (ref 0.5–1.3)
EGFRCR SERPLBLD CKD-EPI 2021: 86 ML/MIN/1.73M*2
EGFRCR SERPLBLD CKD-EPI 2021: >90 ML/MIN/1.73M*2
EOSINOPHIL # BLD AUTO: 0.11 X10*3/UL (ref 0–0.7)
EOSINOPHIL # BLD AUTO: 0.15 X10*3/UL (ref 0–0.7)
EOSINOPHIL # BLD AUTO: 0.18 X10*3/UL (ref 0–0.7)
EOSINOPHIL NFR BLD AUTO: 1.2 %
EOSINOPHIL NFR BLD AUTO: 1.2 %
EOSINOPHIL NFR BLD AUTO: 1.5 %
ERYTHROCYTE [DISTWIDTH] IN BLOOD BY AUTOMATED COUNT: 15.7 % (ref 11.5–14.5)
ERYTHROCYTE [DISTWIDTH] IN BLOOD BY AUTOMATED COUNT: 15.8 % (ref 11.5–14.5)
ERYTHROCYTE [DISTWIDTH] IN BLOOD BY AUTOMATED COUNT: 16.2 % (ref 11.5–14.5)
FLOW: 50 LPM
FREQUENCY (BPM): 29 BPM
GLUCOSE BLD MANUAL STRIP-MCNC: 116 MG/DL (ref 74–99)
GLUCOSE BLD MANUAL STRIP-MCNC: 150 MG/DL (ref 74–99)
GLUCOSE BLDA-MCNC: 209 MG/DL (ref 74–99)
GLUCOSE SERPL-MCNC: 118 MG/DL (ref 74–99)
GLUCOSE SERPL-MCNC: 130 MG/DL (ref 74–99)
GLUCOSE SERPL-MCNC: 135 MG/DL (ref 74–99)
GLUCOSE SERPL-MCNC: 144 MG/DL (ref 74–99)
GLUCOSE SERPL-MCNC: 215 MG/DL (ref 74–99)
HCO3 BLDA-SCNC: 27.8 MMOL/L (ref 22–26)
HCT VFR BLD AUTO: 28.3 % (ref 41–52)
HCT VFR BLD AUTO: 29.6 % (ref 41–52)
HCT VFR BLD AUTO: 31 % (ref 41–52)
HCT VFR BLD EST: 49 % (ref 41–52)
HGB BLD-MCNC: 10.3 G/DL (ref 13.5–17.5)
HGB BLD-MCNC: 10.6 G/DL (ref 13.5–17.5)
HGB BLD-MCNC: 9.7 G/DL (ref 13.5–17.5)
HGB BLDA-MCNC: 16.4 G/DL (ref 13.5–17.5)
HOLD SPECIMEN: NORMAL
HOLD SPECIMEN: NORMAL
IMM GRANULOCYTES # BLD AUTO: 0.09 X10*3/UL (ref 0–0.7)
IMM GRANULOCYTES # BLD AUTO: 0.1 X10*3/UL (ref 0–0.7)
IMM GRANULOCYTES # BLD AUTO: 0.18 X10*3/UL (ref 0–0.7)
IMM GRANULOCYTES NFR BLD AUTO: 1 % (ref 0–0.9)
IMM GRANULOCYTES NFR BLD AUTO: 1 % (ref 0–0.9)
IMM GRANULOCYTES NFR BLD AUTO: 1.2 % (ref 0–0.9)
INHALED O2 CONCENTRATION: 50 %
INR PPP: 1.8 (ref 0.9–1.1)
INSPIRATORY/EXPIRATORY RATIO: ABNORMAL
LACTATE BLDA-SCNC: 3.8 MMOL/L (ref 0.4–2)
LACTATE SERPL-SCNC: 2.6 MMOL/L (ref 0.4–2)
LYMPHOCYTES # BLD AUTO: 0.75 X10*3/UL (ref 1.2–4.8)
LYMPHOCYTES # BLD AUTO: 0.79 X10*3/UL (ref 1.2–4.8)
LYMPHOCYTES # BLD AUTO: 1.06 X10*3/UL (ref 1.2–4.8)
LYMPHOCYTES NFR BLD AUTO: 6.9 %
LYMPHOCYTES NFR BLD AUTO: 7.7 %
LYMPHOCYTES NFR BLD AUTO: 8.7 %
MAGNESIUM SERPL-MCNC: 1.52 MG/DL (ref 1.6–2.4)
MAGNESIUM SERPL-MCNC: 1.73 MG/DL (ref 1.6–2.4)
MAGNESIUM SERPL-MCNC: 1.92 MG/DL (ref 1.6–2.4)
MCH RBC QN AUTO: 33.7 PG (ref 26–34)
MCH RBC QN AUTO: 33.8 PG (ref 26–34)
MCH RBC QN AUTO: 34 PG (ref 26–34)
MCHC RBC AUTO-ENTMCNC: 34.2 G/DL (ref 32–36)
MCHC RBC AUTO-ENTMCNC: 34.3 G/DL (ref 32–36)
MCHC RBC AUTO-ENTMCNC: 34.8 G/DL (ref 32–36)
MCV RBC AUTO: 97 FL (ref 80–100)
MCV RBC AUTO: 98 FL (ref 80–100)
MCV RBC AUTO: 99 FL (ref 80–100)
MONOCYTES # BLD AUTO: 0.7 X10*3/UL (ref 0.1–1)
MONOCYTES # BLD AUTO: 0.82 X10*3/UL (ref 0.1–1)
MONOCYTES # BLD AUTO: 0.96 X10*3/UL (ref 0.1–1)
MONOCYTES NFR BLD AUTO: 5.3 %
MONOCYTES NFR BLD AUTO: 7.7 %
MONOCYTES NFR BLD AUTO: 9.9 %
NEUTROPHILS # BLD AUTO: 13.1 X10*3/UL (ref 1.2–7.7)
NEUTROPHILS # BLD AUTO: 7.37 X10*3/UL (ref 1.2–7.7)
NEUTROPHILS # BLD AUTO: 7.73 X10*3/UL (ref 1.2–7.7)
NEUTROPHILS NFR BLD AUTO: 79.5 %
NEUTROPHILS NFR BLD AUTO: 81 %
NEUTROPHILS NFR BLD AUTO: 85 %
NRBC BLD-RTO: 0 /100 WBCS (ref 0–0)
NRBC BLD-RTO: 0.3 /100 WBCS (ref 0–0)
NRBC BLD-RTO: 0.5 /100 WBCS (ref 0–0)
OXYHGB MFR BLDA: 81.5 % (ref 94–98)
PCO2 BLDA: 41 MM HG (ref 38–42)
PEAK PRESSURE: 16 CM H2O
PH BLDA: 7.44 PH (ref 7.38–7.42)
PLATELET # BLD AUTO: 106 X10*3/UL (ref 150–450)
PLATELET # BLD AUTO: 80 X10*3/UL (ref 150–450)
PLATELET # BLD AUTO: 81 X10*3/UL (ref 150–450)
PO2 BLDA: 54 MM HG (ref 85–95)
POTASSIUM BLDA-SCNC: 3.4 MMOL/L (ref 3.5–5.3)
POTASSIUM SERPL-SCNC: 3.1 MMOL/L (ref 3.5–5.3)
POTASSIUM SERPL-SCNC: 3.1 MMOL/L (ref 3.5–5.3)
POTASSIUM SERPL-SCNC: 3.2 MMOL/L (ref 3.5–5.3)
POTASSIUM SERPL-SCNC: 3.2 MMOL/L (ref 3.5–5.3)
POTASSIUM SERPL-SCNC: 3.7 MMOL/L (ref 3.5–5.3)
PROT SERPL-MCNC: 5.1 G/DL (ref 6.4–8.2)
PROT SERPL-MCNC: 6.1 G/DL (ref 6.4–8.2)
PROT SERPL-MCNC: 6.2 G/DL (ref 6.4–8.2)
PROT SERPL-MCNC: 6.3 G/DL (ref 6.4–8.2)
PROT SERPL-MCNC: 6.5 G/DL (ref 6.4–8.2)
PROTHROMBIN TIME: 20.3 SECONDS (ref 9.8–12.8)
RBC # BLD AUTO: 2.88 X10*6/UL (ref 4.5–5.9)
RBC # BLD AUTO: 3.05 X10*6/UL (ref 4.5–5.9)
RBC # BLD AUTO: 3.12 X10*6/UL (ref 4.5–5.9)
SAO2 % BLDA: 84 % (ref 94–100)
SODIUM BLDA-SCNC: 138 MMOL/L (ref 136–145)
SODIUM SERPL-SCNC: 140 MMOL/L (ref 136–145)
SODIUM SERPL-SCNC: 140 MMOL/L (ref 136–145)
SODIUM SERPL-SCNC: 141 MMOL/L (ref 136–145)
SODIUM SERPL-SCNC: 141 MMOL/L (ref 136–145)
SODIUM SERPL-SCNC: 142 MMOL/L (ref 136–145)
SPECIMEN DRAWN FROM PATIENT: ABNORMAL
SPONTANEOUS TIDAL VOLUME: 502 ML
TIDAL VOLUME: 480 ML
TOTAL MINUTE VOLUME: 15.2 LITER
VENTILATOR MODE: ABNORMAL
VENTILATOR RATE: 16 BPM
WBC # BLD AUTO: 15.4 X10*3/UL (ref 4.4–11.3)
WBC # BLD AUTO: 9.1 X10*3/UL (ref 4.4–11.3)
WBC # BLD AUTO: 9.7 X10*3/UL (ref 4.4–11.3)

## 2024-04-12 PROCEDURE — 93010 ELECTROCARDIOGRAM REPORT: CPT | Performed by: INTERNAL MEDICINE

## 2024-04-12 PROCEDURE — 85730 THROMBOPLASTIN TIME PARTIAL: CPT | Performed by: STUDENT IN AN ORGANIZED HEALTH CARE EDUCATION/TRAINING PROGRAM

## 2024-04-12 PROCEDURE — 2020000001 HC ICU ROOM DAILY

## 2024-04-12 PROCEDURE — 36600 WITHDRAWAL OF ARTERIAL BLOOD: CPT

## 2024-04-12 PROCEDURE — 83605 ASSAY OF LACTIC ACID: CPT | Performed by: STUDENT IN AN ORGANIZED HEALTH CARE EDUCATION/TRAINING PROGRAM

## 2024-04-12 PROCEDURE — 84484 ASSAY OF TROPONIN QUANT: CPT | Performed by: INTERNAL MEDICINE

## 2024-04-12 PROCEDURE — 2500000002 HC RX 250 W HCPCS SELF ADMINISTERED DRUGS (ALT 637 FOR MEDICARE OP, ALT 636 FOR OP/ED): Performed by: INTERNAL MEDICINE

## 2024-04-12 PROCEDURE — 82947 ASSAY GLUCOSE BLOOD QUANT: CPT

## 2024-04-12 PROCEDURE — 70450 CT HEAD/BRAIN W/O DYE: CPT

## 2024-04-12 PROCEDURE — 2500000004 HC RX 250 GENERAL PHARMACY W/ HCPCS (ALT 636 FOR OP/ED): Performed by: STUDENT IN AN ORGANIZED HEALTH CARE EDUCATION/TRAINING PROGRAM

## 2024-04-12 PROCEDURE — 84075 ASSAY ALKALINE PHOSPHATASE: CPT | Performed by: INTERNAL MEDICINE

## 2024-04-12 PROCEDURE — 85025 COMPLETE CBC W/AUTO DIFF WBC: CPT | Performed by: INTERNAL MEDICINE

## 2024-04-12 PROCEDURE — 83735 ASSAY OF MAGNESIUM: CPT | Performed by: STUDENT IN AN ORGANIZED HEALTH CARE EDUCATION/TRAINING PROGRAM

## 2024-04-12 PROCEDURE — 87040 BLOOD CULTURE FOR BACTERIA: CPT | Mod: PARLAB | Performed by: STUDENT IN AN ORGANIZED HEALTH CARE EDUCATION/TRAINING PROGRAM

## 2024-04-12 PROCEDURE — 31500 INSERT EMERGENCY AIRWAY: CPT | Performed by: ANESTHESIOLOGY

## 2024-04-12 PROCEDURE — 71045 X-RAY EXAM CHEST 1 VIEW: CPT

## 2024-04-12 PROCEDURE — 5A12012 PERFORMANCE OF CARDIAC OUTPUT, SINGLE, MANUAL: ICD-10-PCS | Performed by: INTERNAL MEDICINE

## 2024-04-12 PROCEDURE — 0BH17EZ INSERTION OF ENDOTRACHEAL AIRWAY INTO TRACHEA, VIA NATURAL OR ARTIFICIAL OPENING: ICD-10-PCS | Performed by: ANESTHESIOLOGY

## 2024-04-12 PROCEDURE — 36415 COLL VENOUS BLD VENIPUNCTURE: CPT | Performed by: INTERNAL MEDICINE

## 2024-04-12 PROCEDURE — 83735 ASSAY OF MAGNESIUM: CPT | Performed by: INTERNAL MEDICINE

## 2024-04-12 PROCEDURE — 93005 ELECTROCARDIOGRAM TRACING: CPT

## 2024-04-12 PROCEDURE — 71045 X-RAY EXAM CHEST 1 VIEW: CPT | Performed by: RADIOLOGY

## 2024-04-12 PROCEDURE — 94002 VENT MGMT INPAT INIT DAY: CPT

## 2024-04-12 PROCEDURE — 80053 COMPREHEN METABOLIC PANEL: CPT | Performed by: STUDENT IN AN ORGANIZED HEALTH CARE EDUCATION/TRAINING PROGRAM

## 2024-04-12 PROCEDURE — 74018 RADEX ABDOMEN 1 VIEW: CPT | Performed by: RADIOLOGY

## 2024-04-12 PROCEDURE — 92950 HEART/LUNG RESUSCITATION CPR: CPT

## 2024-04-12 PROCEDURE — 82140 ASSAY OF AMMONIA: CPT | Performed by: INTERNAL MEDICINE

## 2024-04-12 PROCEDURE — 84132 ASSAY OF SERUM POTASSIUM: CPT | Performed by: STUDENT IN AN ORGANIZED HEALTH CARE EDUCATION/TRAINING PROGRAM

## 2024-04-12 PROCEDURE — 94799 UNLISTED PULMONARY SVC/PX: CPT

## 2024-04-12 PROCEDURE — 31500 INSERT EMERGENCY AIRWAY: CPT

## 2024-04-12 PROCEDURE — 99233 SBSQ HOSP IP/OBS HIGH 50: CPT | Performed by: INTERNAL MEDICINE

## 2024-04-12 PROCEDURE — 85014 HEMATOCRIT: CPT | Performed by: STUDENT IN AN ORGANIZED HEALTH CARE EDUCATION/TRAINING PROGRAM

## 2024-04-12 PROCEDURE — 87641 MR-STAPH DNA AMP PROBE: CPT | Performed by: STUDENT IN AN ORGANIZED HEALTH CARE EDUCATION/TRAINING PROGRAM

## 2024-04-12 PROCEDURE — 2500000004 HC RX 250 GENERAL PHARMACY W/ HCPCS (ALT 636 FOR OP/ED): Performed by: ANESTHESIOLOGY

## 2024-04-12 PROCEDURE — 5A1945Z RESPIRATORY VENTILATION, 24-96 CONSECUTIVE HOURS: ICD-10-PCS | Performed by: ANESTHESIOLOGY

## 2024-04-12 PROCEDURE — 70450 CT HEAD/BRAIN W/O DYE: CPT | Performed by: STUDENT IN AN ORGANIZED HEALTH CARE EDUCATION/TRAINING PROGRAM

## 2024-04-12 PROCEDURE — 74018 RADEX ABDOMEN 1 VIEW: CPT

## 2024-04-12 PROCEDURE — 81001 URINALYSIS AUTO W/SCOPE: CPT | Performed by: STUDENT IN AN ORGANIZED HEALTH CARE EDUCATION/TRAINING PROGRAM

## 2024-04-12 PROCEDURE — 2500000004 HC RX 250 GENERAL PHARMACY W/ HCPCS (ALT 636 FOR OP/ED): Performed by: INTERNAL MEDICINE

## 2024-04-12 PROCEDURE — 85610 PROTHROMBIN TIME: CPT | Performed by: INTERNAL MEDICINE

## 2024-04-12 PROCEDURE — 2500000005 HC RX 250 GENERAL PHARMACY W/O HCPCS: Performed by: ANESTHESIOLOGY

## 2024-04-12 PROCEDURE — 80053 COMPREHEN METABOLIC PANEL: CPT | Performed by: INTERNAL MEDICINE

## 2024-04-12 PROCEDURE — 2500000001 HC RX 250 WO HCPCS SELF ADMINISTERED DRUGS (ALT 637 FOR MEDICARE OP): Performed by: INTERNAL MEDICINE

## 2024-04-12 PROCEDURE — 84484 ASSAY OF TROPONIN QUANT: CPT | Performed by: STUDENT IN AN ORGANIZED HEALTH CARE EDUCATION/TRAINING PROGRAM

## 2024-04-12 RX ORDER — VANCOMYCIN HYDROCHLORIDE 1 G/20ML
INJECTION, POWDER, LYOPHILIZED, FOR SOLUTION INTRAVENOUS DAILY PRN
Status: DISCONTINUED | OUTPATIENT
Start: 2024-04-12 | End: 2024-04-16

## 2024-04-12 RX ORDER — AMOXICILLIN 250 MG
1 CAPSULE ORAL NIGHTLY
Status: DISCONTINUED | OUTPATIENT
Start: 2024-04-12 | End: 2024-04-13

## 2024-04-12 RX ORDER — NOREPINEPHRINE BITARTRATE 0.03 MG/ML
.01-1 INJECTION, SOLUTION INTRAVENOUS CONTINUOUS
Status: DISCONTINUED | OUTPATIENT
Start: 2024-04-12 | End: 2024-04-13

## 2024-04-12 RX ORDER — MAGNESIUM SULFATE HEPTAHYDRATE 40 MG/ML
2 INJECTION, SOLUTION INTRAVENOUS ONCE
Status: COMPLETED | OUTPATIENT
Start: 2024-04-12 | End: 2024-04-12

## 2024-04-12 RX ORDER — PHENOBARBITAL 32.4 MG/1
16.2 TABLET ORAL 2 TIMES DAILY
Status: DISCONTINUED | OUTPATIENT
Start: 2024-04-12 | End: 2024-04-12

## 2024-04-12 RX ORDER — POTASSIUM CHLORIDE 14.9 MG/ML
20 INJECTION INTRAVENOUS
Status: COMPLETED | OUTPATIENT
Start: 2024-04-12 | End: 2024-04-13

## 2024-04-12 RX ORDER — ACETAMINOPHEN 325 MG/1
650 TABLET ORAL EVERY 6 HOURS PRN
Status: DISCONTINUED | OUTPATIENT
Start: 2024-04-12 | End: 2024-04-18

## 2024-04-12 RX ORDER — PROPOFOL 10 MG/ML
5-50 INJECTION, EMULSION INTRAVENOUS CONTINUOUS
Status: DISCONTINUED | OUTPATIENT
Start: 2024-04-12 | End: 2024-04-12

## 2024-04-12 RX ORDER — ACETAMINOPHEN 160 MG/5ML
650 SOLUTION ORAL EVERY 6 HOURS SCHEDULED
Status: DISCONTINUED | OUTPATIENT
Start: 2024-04-13 | End: 2024-04-13

## 2024-04-12 RX ORDER — PANTOPRAZOLE SODIUM 40 MG/10ML
40 INJECTION, POWDER, LYOPHILIZED, FOR SOLUTION INTRAVENOUS
Status: DISCONTINUED | OUTPATIENT
Start: 2024-04-13 | End: 2024-04-13

## 2024-04-12 RX ORDER — MEPERIDINE HYDROCHLORIDE 25 MG/ML
12.5 INJECTION INTRAMUSCULAR; INTRAVENOUS; SUBCUTANEOUS EVERY 4 HOURS PRN
Status: DISCONTINUED | OUTPATIENT
Start: 2024-04-12 | End: 2024-04-14

## 2024-04-12 RX ORDER — BUSPIRONE HYDROCHLORIDE 10 MG/1
30 TABLET ORAL EVERY 8 HOURS SCHEDULED
Status: DISCONTINUED | OUTPATIENT
Start: 2024-04-12 | End: 2024-04-14

## 2024-04-12 RX ORDER — CHLORDIAZEPOXIDE HYDROCHLORIDE 25 MG/1
25 CAPSULE, GELATIN COATED ORAL 3 TIMES DAILY
Status: DISCONTINUED | OUTPATIENT
Start: 2024-04-12 | End: 2024-04-13

## 2024-04-12 RX ORDER — LACTULOSE 10 G/15ML
20 SOLUTION ORAL 3 TIMES DAILY
Status: DISCONTINUED | OUTPATIENT
Start: 2024-04-12 | End: 2024-04-20

## 2024-04-12 RX ORDER — DEXMEDETOMIDINE HYDROCHLORIDE 4 UG/ML
.1-1.5 INJECTION, SOLUTION INTRAVENOUS CONTINUOUS
Status: DISCONTINUED | OUTPATIENT
Start: 2024-04-12 | End: 2024-04-17

## 2024-04-12 RX ORDER — CHLORDIAZEPOXIDE HYDROCHLORIDE 5 MG/1
10 CAPSULE, GELATIN COATED ORAL 3 TIMES DAILY
Status: DISCONTINUED | OUTPATIENT
Start: 2024-04-12 | End: 2024-04-12

## 2024-04-12 RX ORDER — PANTOPRAZOLE SODIUM 40 MG/1
40 TABLET, DELAYED RELEASE ORAL
Status: DISCONTINUED | OUTPATIENT
Start: 2024-04-13 | End: 2024-04-13

## 2024-04-12 RX ADMIN — Medication 50 PERCENT: at 18:44

## 2024-04-12 RX ADMIN — FOLIC ACID 1 MG: 1 TABLET ORAL at 11:24

## 2024-04-12 RX ADMIN — ENOXAPARIN SODIUM 40 MG: 40 INJECTION SUBCUTANEOUS at 11:14

## 2024-04-12 RX ADMIN — MAGNESIUM SULFATE IN WATER 2 G: 40 INJECTION, SOLUTION INTRAVENOUS at 18:00

## 2024-04-12 RX ADMIN — LORAZEPAM 2 MG: 2 INJECTION INTRAMUSCULAR; INTRAVENOUS at 03:20

## 2024-04-12 RX ADMIN — THIAMINE HCL TAB 100 MG 100 MG: 100 TAB at 11:25

## 2024-04-12 RX ADMIN — CHLORDIAZEPOXIDE HYDROCHLORIDE 10 MG: 5 CAPSULE ORAL at 15:18

## 2024-04-12 RX ADMIN — SODIUM CHLORIDE, SODIUM LACTATE, POTASSIUM CHLORIDE, CALCIUM CHLORIDE AND DEXTROSE MONOHYDRATE 125 ML/HR: 5; 600; 310; 30; 20 INJECTION, SOLUTION INTRAVENOUS at 04:46

## 2024-04-12 RX ADMIN — POTASSIUM CHLORIDE 20 MEQ: 14.9 INJECTION, SOLUTION INTRAVENOUS at 19:59

## 2024-04-12 RX ADMIN — PANTOPRAZOLE SODIUM 40 MG: 40 TABLET, DELAYED RELEASE ORAL at 11:25

## 2024-04-12 RX ADMIN — Medication: at 20:00

## 2024-04-12 RX ADMIN — LORAZEPAM 2 MG: 2 INJECTION INTRAMUSCULAR; INTRAVENOUS at 13:05

## 2024-04-12 RX ADMIN — TAMSULOSIN HYDROCHLORIDE 0.4 MG: 0.4 CAPSULE ORAL at 11:24

## 2024-04-12 RX ADMIN — LORAZEPAM 1 MG: 2 INJECTION INTRAMUSCULAR; INTRAVENOUS at 01:27

## 2024-04-12 RX ADMIN — POTASSIUM CHLORIDE 20 MEQ: 14.9 INJECTION, SOLUTION INTRAVENOUS at 22:42

## 2024-04-12 RX ADMIN — LORAZEPAM 2 MG: 2 INJECTION INTRAMUSCULAR; INTRAVENOUS at 08:30

## 2024-04-12 RX ADMIN — LACTULOSE 20 G: 20 SOLUTION ORAL at 15:18

## 2024-04-12 RX ADMIN — Medication 1 TABLET: at 11:42

## 2024-04-12 RX ADMIN — LORAZEPAM 2 MG: 2 INJECTION INTRAMUSCULAR; INTRAVENOUS at 15:19

## 2024-04-12 RX ADMIN — LACTULOSE 20 G: 20 SOLUTION ORAL at 11:25

## 2024-04-12 RX ADMIN — LORAZEPAM 2 MG: 2 INJECTION INTRAMUSCULAR; INTRAVENOUS at 06:08

## 2024-04-12 RX ADMIN — LORAZEPAM 2 MG: 2 INJECTION INTRAMUSCULAR; INTRAVENOUS at 10:59

## 2024-04-12 RX ADMIN — Medication: at 19:00

## 2024-04-12 RX ADMIN — GABAPENTIN 100 MG: 100 CAPSULE ORAL at 12:38

## 2024-04-12 RX ADMIN — DEXMEDETOMIDINE HYDROCHLORIDE 0.2 MCG/KG/HR: 400 INJECTION INTRAVENOUS at 20:12

## 2024-04-12 ASSESSMENT — LIFESTYLE VARIABLES
AUDITORY DISTURBANCES: NOT PRESENT
NAUSEA AND VOMITING: NO NAUSEA AND NO VOMITING
TOTAL SCORE: 12
VISUAL DISTURBANCES: NOT PRESENT
TREMOR: 3
AUDITORY DISTURBANCES: NOT PRESENT
TOTAL SCORE: 14
ANXIETY: 3
NAUSEA AND VOMITING: NO NAUSEA AND NO VOMITING
ORIENTATION AND CLOUDING OF SENSORIUM: DISORIENTED FOR PLACE OR PERSON
PAROXYSMAL SWEATS: NO SWEAT VISIBLE
PAROXYSMAL SWEATS: NO SWEAT VISIBLE
PAROXYSMAL SWEATS: BARELY PERCEPTIBLE SWEATING, PALMS MOIST
HEADACHE, FULLNESS IN HEAD: NOT PRESENT
AUDITORY DISTURBANCES: NOT PRESENT
TREMOR: MODERATE, WITH PATIENT'S ARMS EXTENDED
TOTAL SCORE: 11
HEADACHE, FULLNESS IN HEAD: NOT PRESENT
NAUSEA AND VOMITING: NO NAUSEA AND NO VOMITING
TOTAL SCORE: 9
PAROXYSMAL SWEATS: NO SWEAT VISIBLE
AGITATION: MODERATELY FIDGETY AND RESTLESS
ORIENTATION AND CLOUDING OF SENSORIUM: CANNOT DO SERIAL ADDITIONS OR IS UNCERTAIN ABOUT DATE
ANXIETY: MILDLY ANXIOUS
VISUAL DISTURBANCES: MILD SENSITIVITY
ORIENTATION AND CLOUDING OF SENSORIUM: ORIENTED AND CAN DO SERIAL ADDITIONS
ORIENTATION AND CLOUDING OF SENSORIUM: CANNOT DO SERIAL ADDITIONS OR IS UNCERTAIN ABOUT DATE
AUDITORY DISTURBANCES: NOT PRESENT
AUDITORY DISTURBANCES: NOT PRESENT
BLOOD PRESSURE: 102/59
HEADACHE, FULLNESS IN HEAD: NOT PRESENT
AGITATION: MODERATELY FIDGETY AND RESTLESS
PAROXYSMAL SWEATS: NO SWEAT VISIBLE
VISUAL DISTURBANCES: NOT PRESENT
TACTILE DISTURBANCES: MILD ITCHING, PINS AND NEEDLES, BURNING OR NUMBNESS
VISUAL DISTURBANCES: NOT PRESENT
VISUAL DISTURBANCES: NOT PRESENT
PAROXYSMAL SWEATS: NO SWEAT VISIBLE
TREMOR: NO TREMOR
HEADACHE, FULLNESS IN HEAD: NOT PRESENT
PULSE: 106
HEADACHE, FULLNESS IN HEAD: NOT PRESENT
NAUSEA AND VOMITING: NO NAUSEA AND NO VOMITING
NAUSEA AND VOMITING: NO NAUSEA AND NO VOMITING
ANXIETY: MODERATELY ANXIOUS, OR GUARDED, SO ANXIETY IS INFERRED
ORIENTATION AND CLOUDING OF SENSORIUM: ORIENTED AND CAN DO SERIAL ADDITIONS
ANXIETY: MODERATELY ANXIOUS, OR GUARDED, SO ANXIETY IS INFERRED
AGITATION: SOMEWHAT MORE THAN NORMAL ACTIVITY
TACTILE DISTURBANCES: VERY MILD ITCHING, PINS AND NEEDLES, BURNING OR NUMBNESS
AGITATION: MODERATELY FIDGETY AND RESTLESS
TREMOR: 3
AGITATION: 3
TOTAL SCORE: 9
TREMOR: 2
ANXIETY: 3

## 2024-04-12 ASSESSMENT — COGNITIVE AND FUNCTIONAL STATUS - GENERAL
TOILETING: TOTAL
DRESSING REGULAR LOWER BODY CLOTHING: TOTAL
TOILETING: A LITTLE
STANDING UP FROM CHAIR USING ARMS: A LITTLE
CLIMB 3 TO 5 STEPS WITH RAILING: TOTAL
MOVING TO AND FROM BED TO CHAIR: A LITTLE
MOVING FROM LYING ON BACK TO SITTING ON SIDE OF FLAT BED WITH BEDRAILS: A LITTLE
MOVING TO AND FROM BED TO CHAIR: TOTAL
STANDING UP FROM CHAIR USING ARMS: TOTAL
DRESSING REGULAR UPPER BODY CLOTHING: TOTAL
HELP NEEDED FOR BATHING: TOTAL
CLIMB 3 TO 5 STEPS WITH RAILING: A LOT
WALKING IN HOSPITAL ROOM: A LITTLE
DAILY ACTIVITIY SCORE: 6
PERSONAL GROOMING: TOTAL
HELP NEEDED FOR BATHING: A LITTLE
MOBILITY SCORE: 6
TURNING FROM BACK TO SIDE WHILE IN FLAT BAD: A LITTLE
WALKING IN HOSPITAL ROOM: TOTAL
DAILY ACTIVITIY SCORE: 22
TURNING FROM BACK TO SIDE WHILE IN FLAT BAD: TOTAL
MOVING FROM LYING ON BACK TO SITTING ON SIDE OF FLAT BED WITH BEDRAILS: TOTAL
MOBILITY SCORE: 17
EATING MEALS: TOTAL

## 2024-04-12 ASSESSMENT — PAIN - FUNCTIONAL ASSESSMENT
PAIN_FUNCTIONAL_ASSESSMENT: 0-10
PAIN_FUNCTIONAL_ASSESSMENT: CPOT (CRITICAL CARE PAIN OBSERVATION TOOL)

## 2024-04-12 ASSESSMENT — PAIN SCALES - GENERAL
PAINLEVEL_OUTOF10: 0 - NO PAIN

## 2024-04-12 NOTE — PROGRESS NOTES
Occupational Therapy                 Therapy Communication Note    Patient Name: Trey Borjas  MRN: [id number]  Today's Date: 4/12/2024     Discipline: Occupational Therapy    Missed Visit Reason: Missed Visit Reason:  (pt. in withdrawal with transfer to telemetry)    Missed Time: Attempt    Comment:

## 2024-04-12 NOTE — CARE PLAN
Problem: Pain  Goal: My pain/discomfort is manageable  4/12/2024 0419 by Maik Pressley RN  Outcome: Progressing  4/12/2024 0419 by Maik Pressley RN  Outcome: Progressing     Problem: Safety  Goal: Patient will be injury free during hospitalization  4/12/2024 0419 by Maik Pressley RN  Outcome: Progressing  4/12/2024 0419 by Maik Pressley RN  Outcome: Progressing  Goal: I will remain free of falls  4/12/2024 0419 by Maik Pressley RN  Outcome: Progressing  4/12/2024 0419 by Maik Pressley RN  Outcome: Progressing     Problem: Daily Care  Goal: Daily care needs are met  4/12/2024 0419 by Maik Pressley RN  Outcome: Progressing  4/12/2024 0419 by Maik Pressley RN  Outcome: Progressing     Problem: Psychosocial Needs  Goal: Demonstrates ability to cope with hospitalization/illness  4/12/2024 0419 by Maik Pressley RN  Outcome: Progressing  4/12/2024 0419 by Maik Pressley RN  Outcome: Progressing  Goal: Collaborate with me, my family, and caregiver to identify my specific goals  4/12/2024 0419 by Maik Pressley RN  Outcome: Progressing  4/12/2024 0419 by Maik Pressley RN  Outcome: Progressing     Problem: Discharge Barriers  Goal: My discharge needs are met  4/12/2024 0419 by Maik Pressley RN  Outcome: Progressing  4/12/2024 0419 by Maik Pressley RN  Outcome: Progressing     Problem: ACS/CP/NSTEMI/STEMI  Goal: Chest pain managed (free from pain or at acceptable level)  4/12/2024 0419 by Maik Pressley RN  Outcome: Progressing  4/12/2024 0419 by Maik Pressley RN  Outcome: Progressing  Goal: Lab values return to normal range  4/12/2024 0419 by Maik Pressley RN  Outcome: Progressing  4/12/2024 0419 by Maik Pressley RN  Outcome: Progressing  Goal: Promote self management  4/12/2024 0419 by Maik Pressley RN  Outcome: Progressing  4/12/2024 0419 by Maik Pressley RN  Outcome: Progressing  Goal: Serial ECG will return to baseline  4/12/2024 0419 by Maik Pressley, RN  Outcome: Progressing  4/12/2024 0419 by Maik  BRENNA Pressley  Outcome: Progressing  Goal: Verbalize understanding of procedures/devices  4/12/2024 0419 by Maik Pressley RN  Outcome: Progressing  4/12/2024 0419 by Maik Pressley RN  Outcome: Progressing  Goal: Wean vasopressors/achieve hemodynamic stability  4/12/2024 0419 by Maik Pressley RN  Outcome: Progressing  4/12/2024 0419 by Maik Pressley RN  Outcome: Progressing     Problem: Pain  Goal: My pain/discomfort is manageable  4/12/2024 0420 by Maik Pressley RN  Outcome: Progressing  4/12/2024 0419 by Maik Pressley RN  Outcome: Progressing  4/12/2024 0419 by Maik Pressley RN  Outcome: Progressing     Problem: Safety  Goal: Patient will be injury free during hospitalization  4/12/2024 0420 by Maik Pressley RN  Outcome: Progressing  4/12/2024 0419 by Maik Pressley RN  Outcome: Progressing  4/12/2024 0419 by Maik Pressley RN  Outcome: Progressing  Goal: I will remain free of falls  4/12/2024 0420 by Maik Pressley RN  Outcome: Progressing  4/12/2024 0419 by Maik Pressley RN  Outcome: Progressing  4/12/2024 0419 by Maik Pressley RN  Outcome: Progressing     Problem: Daily Care  Goal: Daily care needs are met  4/12/2024 0420 by Maik Pressley RN  Outcome: Progressing  4/12/2024 0419 by Maik Pressley RN  Outcome: Progressing  4/12/2024 0419 by Maik Pressley RN  Outcome: Progressing     Problem: Psychosocial Needs  Goal: Demonstrates ability to cope with hospitalization/illness  4/12/2024 0420 by Maik Pressley RN  Outcome: Progressing  4/12/2024 0419 by Maik Pressley RN  Outcome: Progressing  4/12/2024 0419 by Maik Pressley RN  Outcome: Progressing  Goal: Collaborate with me, my family, and caregiver to identify my specific goals  4/12/2024 0420 by Maik Pressley RN  Outcome: Progressing  4/12/2024 0419 by Maik Pressley RN  Outcome: Progressing  4/12/2024 0419 by Maik Pressley RN  Outcome: Progressing     Problem: Discharge Barriers  Goal: My discharge needs are met  4/12/2024 0420 by Maik Pressley,  RN  Outcome: Progressing  4/12/2024 0419 by Maik Pressley RN  Outcome: Progressing  4/12/2024 0419 by Maik Pressley, BRENNA  Outcome: Progressing   The patient's goals for the shift include  safety/comfort    The clinical goals for the shift include Safety

## 2024-04-12 NOTE — PROGRESS NOTES
Trey Borjas is a 63 y.o. male on day 0 of admission presenting with Atrial flutter, unspecified type (Multi).        Subjective   Worsening ETOH withdrawal overnight.   Stat labs/ammonia.  Will schedule librium.   Avoid phenobarb in setting of cirrhosis for now.   Transfer to step down.   CT chest yesterday ruled out tracheal abnormalities concerned in CXR.  Will need OP follow up for aneurysm.          Objective     Last Recorded Vitals  /62 (BP Location: Left arm, Patient Position: Lying)   Pulse 90   Temp 36.8 °C (98.2 °F) (Temporal)   Resp 18   Ht 1.829 m (6')   Wt 96.2 kg (212 lb)   SpO2 90%   BMI 28.75 kg/m²     Intake/Output last 3 Shifts:  I/O last 3 completed shifts:  In: 2000 (20.8 mL/kg) [I.V.:2000 (20.8 mL/kg)]  Out: 200 (2.1 mL/kg) [Urine:200 (0.1 mL/kg/hr)]  Weight: 96.2 kg       =========RELEVANT RESULTS ==========  Labs  Lab Results   Component Value Date    WBC 5.8 04/11/2024    HGB 9.5 (L) 04/11/2024    HCT 28.2 (L) 04/11/2024    MCV 98 04/11/2024    PLT 87 (L) 04/11/2024     Lab Results   Component Value Date    GLUCOSE 129 (H) 04/11/2024    CALCIUM 8.9 04/11/2024     04/11/2024    K 3.5 04/11/2024    CO2 28 04/11/2024     04/11/2024    BUN 12 04/11/2024    CREATININE 0.69 04/11/2024      Lab Results   Component Value Date    ALT 33 04/11/2024    AST 93 (H) 04/11/2024    ALKPHOS 157 (H) 04/11/2024    BILITOT 5.5 (H) 04/11/2024        Recent Echocardiogram (14D):   No echocardiogram results found for the past 14 days    TTE 12 month if available:  No echocardiogram results found for the past 12 months    Recent Imaging Results:  ECG 12 lead  Accelerated Junctional rhythm  Incomplete right bundle branch block  ST & Marked T wave abnormality, consider anterolateral ischemia  Prolonged QT  Abnormal ECG  When compared with ECG of 22-NOV-2022 14:22,  PREVIOUS ECG IS PRESENT  See ED provider note for full interpretation and clinical correlation  Confirmed by Marlo  Shyanne ([id number]) on 4/11/2024 9:16:40 PM  CT chest w IV contrast  Narrative: Interpreted By:  Tabatha Tate,   STUDY:  CT CHEST W IV CONTRAST;  4/11/2024 5:40 pm      INDICATION:  Signs/Symptoms:Abnormal Chest Xray      COMPARISON:  Chest x-ray 04/11/2024. Gallbladder ultrasound 04/10/2024. CT abdomen  and pelvis 11/22/2022.      ACCESSION NUMBER(S):  [id number]      ORDERING CLINICIAN:  LENIN KRAMER      TECHNIQUE:  Axial CT images were obtained through the chest following the  uneventful administration of intravenous contrast material.  Coronal  and sagittal reformats were performed.      FINDINGS:  There is motion artifact.      HEART AND VESSELS:  There is 4.1 cm aneurysmal dilation of the ascending thoracic aorta.  The phase of intravenous contrast is not tailored to evaluate the  thoracic aorta. Mild atherosclerotic calcifications at the thoracic  aorta. The main pulmonary artery is dilated.  The heart is not enlarged.    No evidence of pericardial effusion.      MEDIASTINUM AND SHAGUFTA, LOWER NECK AND AXILLA:  The visualized thyroid gland is within normal limits.      No evidence of thoracic lymphadenopathy by CT criteria. No  mediastinal mass.      LUNGS AND AIRWAYS:  The trachea and central airways are patent.      The evaluation of the lungs is degraded by motion artifact. Mild  ground-glass opacities are noted at the left upper lobe and bilateral  lower lobes. No consolidation, pleural effusion or pneumothorax. No  obvious pulmonary nodule is identified. There are mild emphysematous  changes.      UPPER ABDOMEN:  The visualized liver demonstrates a mildly nodular contour.  Subcentimeter hypodensity at the periphery of the right hepatic lobe  is too small to be adequately characterized. There is cholelithiasis.  The spleen is mildly enlarged measuring 14 cm.  There is diffuse fatty infiltration of the pancreas.  Soft tissue stranding noted at the left upper quadrant adjacent to  the visualized  descending colon. Atherosclerotic calcifications at  the visualized abdominal aorta.      CHEST WALL AND OSSEOUS STRUCTURES:  There is a left-sided pacemaker.  Multilevel degenerative changes at the spine.      Impression: 1. Study degraded by motion artifact. No obvious pulmonary nodule is  identified. No mediastinal mass.  2. Mild ground-glass opacities at the left upper lobe and bilateral  lower lobes, may be secondary to atelectasis or pneumonia.  3. Mild emphysema.  4. Dilation of the main pulmonary artery. Please correlate for  pulmonary arterial hypertension.  5. 4.1 cm ascending thoracic aortic aneurysm.  6. Mildly nodular contour of the liver. Is there any clinical history  of cirrhosis?  7. Mild splenomegaly.  8. Cholelithiasis.  9. Inflammatory changes at the left upper quadrant adjacent to the  visualized descending colon. CT abdomen and pelvis recommended for  further evaluation.          MACRO:  Critical Finding:  See findings. Notification was initiated on  4/11/2024 at 7:06 pm by  Tabatha Tate.  (**-OCF-**) Instructions:      Signed by: Tabatha Tate 4/11/2024 7:21 PM  Dictation workstation:   [id number]  XR chest 2 views  Narrative: Interpreted By:  Milton Munson,   STUDY:  XR CHEST 2 VIEWS; 4/11/2024 10:19 am      INDICATION:  Signs/Symptoms:Mild hypoxia overnight..      COMPARISON:  11/24/2022      ACCESSION NUMBER(S):  [id number]      ORDERING CLINICIAN:  LENIN KRAMER      TECHNIQUE:  AP and lateral views of the chest were obtained.      FINDINGS:  The cardiac silhouette is normal in appearance. A bipolar cardiac  pacemaker is present.  Trachea is effaced in deviated towards the  right for distance of approximately 8 cm suggesting a mediastinal  mass. Lateral view is limited by motion artifact but raises concern  for possible pulmonary nodules, difficult to appreciate on the  frontal view. No alveolar consolidation is noted. No effusions are  identified.      Impression: 1. Limited  exam as above.  2. CT chest with intravenous contrast is recommended however to rule  out a mediastinal mass effacing the trachea as well as to exclude the  possibility pulmonary nodules suggested on the lateral view.      Urgent message sent by secure chat.      MACRO:  none      Signed by: Milton Munson 4/11/2024 10:56 AM  Dictation workstation:   [id number]      Exam     GENERAL:   anxious, tremulous,  findings of ETOH withdrawal  HEENT: Normal Inspection, Mucous membranes moist, No JVD, No Lymphadenopathy  CARDIOVASCULAR: RRR, no murmurs, 2+ equal pulses of the extremities, normal S1 and S 2  RESPIRATORY: Patent airways, CTAB, thorax symmetric, No significant wheezing, Rales or Rhonchi  ABDOMEN: Soft, Non-Tender, Normal Bowel Sounds, No Distention  SKIN: Warm and dry, no lesions, no rashes  EXTREMITIES: normal extremities, no significant cyanosis edema, contusions or wounds, no obsvious clubbing  NEURO: A&O x 1, CN II-XII grossly intact  PSYCH: Appropriate mood and behavior    Additional Physical Exam Notes/Findings        ======= SCHEDULED MEDICATIONS =======  Scheduled medications   Medication Dose Route Frequency    allopurinol  300 mg oral Daily    chlordiazePOXIDE  10 mg oral TID    enoxaparin  40 mg subcutaneous Daily    folic acid  1 mg oral Daily    gabapentin  100 mg oral BID    gabapentin  300 mg oral Nightly    metoprolol tartrate  50 mg oral BID    multivitamin with minerals  1 tablet oral Daily    pantoprazole  40 mg oral Daily    tamsulosin  0.4 mg oral Daily    thiamine  100 mg oral Daily       ========== PRN MEDICATIONS =========  guaiFENesin, 600 mg, q12h PRN  LORazepam, 0.5 mg, q2h PRN   Or  LORazepam, 1 mg, q2h PRN   Or  LORazepam, 2 mg, q2h PRN  melatonin, 3 mg, Nightly PRN  polyethylene glycol, 17 g, Daily PRN        ==============  DIET  ==============  Dietary Orders (From admission, onward)       Start     Ordered    04/10/24 8333  Adult diet Regular  Diet effective now        Question:   Diet type  Answer:  Regular    04/10/24 5550                    ====== Assessment/Plan   =======    ASSESSMENT:  Active Problems:    Atrial flutter, unspecified type (Multi)    ___________________________________________________    Atrial Flutter  Mild metabolic acidosis  Alcohol dependence  Etoh cirrhosis h/o GIB  Hypokalemia  Peripheral Neuropathy b/l feet      PLAN:  Developed worsening ETOH withdrawal.   CIWA.   Phenobarbital contraindicated in cirrhosis, will attempt scheduled Benzo.    Protecting airway at this time.     Transfer to step down.  Low threshold for transfer to ICU if not responding to modified treatment this AM.       DVT Prophylaxis  SubQ lovenox    SUMMARY/DISPOSITION  Severe etoh withdrawal.  Transfer to stepdown.                 Sergei Gillis, DO

## 2024-04-12 NOTE — PROGRESS NOTES
Physical Therapy                 Therapy Communication Note    Patient Name: Trey Borjas  MRN: [id number]  Today's Date: 4/12/2024     Discipline: Physical Therapy    Missed Visit Reason: Missed Visit Reason:  (Pt just transferred from 3rd floor to step down for close monitoring of CIWA symptoms. Currently pt in flexed posture in bed having RN assessment completed. Concern for increasing CIWA symptoms. Will hold PT at this time.)    Missed Time: Attempt

## 2024-04-12 NOTE — PROCEDURES
INTUBATION    Pre-procedure diagnosis: Pulseless ventricular tachycardia with ROSC  Post-procedure diagnosis: Same  Indication(s):  Airway protection  Performed by:  Me  Assistant(s):  RT  EBL:   None    Consent obtained: Emergent  Patient identified: Yes  Timeout performed: Emergent    Induction agent(s):  None    Details:  Responded to code blue.  ROSC achieved shortly before my arrival.  Patient obtunded and two-person BMV was being performed by RT with 100% O2 by [employer].  Direct laryngoscopy performed with Mac 3, grade 2b view.  Atraumatic intubation with #7.5 ETT at 24 cm @ lips.  +EtCO2.  Breath sounds equal bilaterally.    No complications.  Other details: None.      Leopoldo Goodwin MD

## 2024-04-12 NOTE — SIGNIFICANT EVENT
CODE BLUE called by nursing staff as patient became unresponsive.  As per nursing, telemetry notified nursing staff of development of V. tach.  Upon arrival to the room, patient was unresponsive and pulses not present.  CODE BLUE initiated CPR initiated.  Upon arrival to the room, CPR active.  Patient had received epinephrine by that time.  Pads were placed.  Upon pulse check, patient was noted to have pulses and breathing.  Please see CODE BLUE notes for details but it appears as though ROSC obtained within minutes.  Intensivist responding to CODE BLUE as well updated on patient's condition and deterioration from alcohol withdrawal throughout the stay.   Patient was intubated and will be transferred to ICU for ongoing care.   Patients brother notified by resident staff and agreeable to plan.

## 2024-04-12 NOTE — NURSING NOTE
Patient had an episode sustained V-tach and became unresponsive. A rapid response called where CPR was performed and patient was intubated and transferred to her heart center. Patient brother Felipe was contacted but call went to voicemail. Message was left with update of patient condition and transfer and to call the unit if any questions.

## 2024-04-12 NOTE — CARE PLAN
Problem: Pain  Goal: My pain/discomfort is manageable  Outcome: Progressing     Problem: Safety  Goal: Patient will be injury free during hospitalization  Outcome: Progressing  Goal: I will remain free of falls  Outcome: Progressing     Problem: Daily Care  Goal: Daily care needs are met  Outcome: Progressing     Problem: Psychosocial Needs  Goal: Demonstrates ability to cope with hospitalization/illness  Outcome: Progressing  Goal: Collaborate with me, my family, and caregiver to identify my specific goals  Outcome: Progressing     Problem: Discharge Barriers  Goal: My discharge needs are met  Outcome: Progressing     Problem: ACS/CP/NSTEMI/STEMI  Goal: Chest pain managed (free from pain or at acceptable level)  Outcome: Progressing  Goal: Lab values return to normal range  Outcome: Progressing  Goal: Promote self management  Outcome: Progressing  Goal: Serial ECG will return to baseline  Outcome: Progressing  Goal: Verbalize understanding of procedures/devices  Outcome: Progressing  Goal: Wean vasopressors/achieve hemodynamic stability  Outcome: Progressing     Problem: Arrythmia/Dysrhythmia  Goal: Lab values return to normal range  Outcome: Progressing  Goal: No evidence of post procedure complications  Outcome: Progressing  Goal: Promote self management  Outcome: Progressing  Goal: Serial ECG will return to baseline  Outcome: Progressing  Goal: Verbalize understanding of procedures/devices  Outcome: Progressing  Goal: Vital signs return to baseline  Outcome: Progressing  Goal: Care and maintenance of device (specify)  Outcome: Progressing     Problem: Cardiac catheterization  Goal: Free from dysrhythmias  Outcome: Progressing  Goal: Free from pain  Outcome: Progressing  Goal: No evidence of post procedure complications  Outcome: Progressing  Goal: Promote self management  Outcome: Progressing  Goal: Verbalize understanding of procedure  Outcome: Progressing  Goal: Care and maintenance of device  (specify)  Outcome: Progressing     Problem: Hypertensive Emergency/Crisis  Goal: Blood pressure gradually reduced to goal range  Outcome: Progressing  Goal: Free from signs of organ damage  Outcome: Progressing  Goal: Lab values return to normal range  Outcome: Progressing  Goal: Promote self management  Outcome: Progressing     Problem: Respiratory  Goal: Clear secretions with interventions this shift  Outcome: Progressing  Goal: Minimize anxiety/maximize coping throughout shift  Outcome: Progressing  Goal: Minimal/no exertional discomfort or dyspnea this shift  Outcome: Progressing  Goal: No signs of respiratory distress (eg. Use of accessory muscles. Peds grunting)  Outcome: Progressing  Goal: Patent airway maintained this shift  Outcome: Progressing  Goal: Tolerate mechanical ventilation evidenced by VS/agitation level this shift  Outcome: Progressing  Goal: Tolerate pulmonary toileting this shift  Outcome: Progressing  Goal: Verbalize decreased shortness of breath this shift  Outcome: Progressing  Goal: Wean oxygen to maintain O2 saturation per order/standard this shift  Outcome: Progressing  Goal: Increase self care and/or family involvement in next 24 hours  Outcome: Progressing   The patient's goals for the shift include      The clinical goals for the shift include Patient CIWA score will be < 9 during shift    Over the shift, the patient did not make progress toward the following goals. Barriers to progression include .. Recommendations to address these barriers include ..

## 2024-04-12 NOTE — PROGRESS NOTES
04/12/24 1042   Discharge Planning   Living Arrangements Family members   Support Systems Family members   Assistance Needed Independent, patient does drive   Type of Residence Private residence  (3rd floor condo)   Number of Stairs to Enter Residence 21     Met with patients brother at bedside, introduced self and role on care transitions team. Patient unable to participate in admission assessment at this time. Admission assessment completed with patients brother. Patient lives at home with his brother Felipe. Address, insurance and contact information verified with patients brother. PCP is Dr. Tyrone Dick. Care transitions team will continue to follow for discharge planning.

## 2024-04-13 ENCOUNTER — APPOINTMENT (OUTPATIENT)
Dept: CARDIOLOGY | Facility: HOSPITAL | Age: 64
DRG: 308 | End: 2024-04-13
Payer: COMMERCIAL

## 2024-04-13 ENCOUNTER — APPOINTMENT (OUTPATIENT)
Dept: RADIOLOGY | Facility: HOSPITAL | Age: 64
DRG: 308 | End: 2024-04-13
Payer: COMMERCIAL

## 2024-04-13 LAB
ABO GROUP (TYPE) IN BLOOD: NORMAL
ALBUMIN SERPL BCP-MCNC: 2.6 G/DL (ref 3.4–5)
ALP SERPL-CCNC: 139 U/L (ref 33–136)
ALT SERPL W P-5'-P-CCNC: 43 U/L (ref 10–52)
ANION GAP SERPL CALC-SCNC: 11 MMOL/L (ref 10–20)
ANION GAP SERPL CALC-SCNC: 12 MMOL/L (ref 10–20)
ANTIBODY SCREEN: NORMAL
AORTIC VALVE MEAN GRADIENT: 5 MMHG
AORTIC VALVE PEAK VELOCITY: 1.54 M/S
APPARATUS: ABNORMAL
APPARATUS: ABNORMAL
APPEARANCE UR: ABNORMAL
APTT PPP: 40 SECONDS (ref 27–38)
APTT PPP: 41 SECONDS (ref 27–38)
ARTERIAL PATENCY WRIST A: POSITIVE
AST SERPL W P-5'-P-CCNC: 150 U/L (ref 9–39)
AV PEAK GRADIENT: 9.5 MMHG
AVA (PEAK VEL): 3.08 CM2
AVA (VTI): 2.96 CM2
BACTERIA #/AREA URNS AUTO: ABNORMAL /HPF
BASE EXCESS BLDA CALC-SCNC: 4.2 MMOL/L (ref -2–3)
BASE EXCESS BLDA CALC-SCNC: 4.2 MMOL/L (ref -2–3)
BASOPHILS # BLD AUTO: 0.03 X10*3/UL (ref 0–0.1)
BASOPHILS # BLD AUTO: 0.03 X10*3/UL (ref 0–0.1)
BASOPHILS NFR BLD AUTO: 0.2 %
BASOPHILS NFR BLD AUTO: 0.3 %
BILIRUB SERPL-MCNC: 6.4 MG/DL (ref 0–1.2)
BILIRUB UR STRIP.AUTO-MCNC: ABNORMAL MG/DL
BLOOD EXPIRATION DATE: NORMAL
BODY TEMPERATURE: 37 DEGREES CELSIUS
BODY TEMPERATURE: 37 DEGREES CELSIUS
BUN SERPL-MCNC: 10 MG/DL (ref 6–23)
BUN SERPL-MCNC: 10 MG/DL (ref 6–23)
CALCIUM SERPL-MCNC: 8.4 MG/DL (ref 8.6–10.3)
CALCIUM SERPL-MCNC: 8.5 MG/DL (ref 8.6–10.3)
CARDIAC TROPONIN I PNL SERPL HS: 41 NG/L (ref 0–20)
CHLORIDE SERPL-SCNC: 105 MMOL/L (ref 98–107)
CHLORIDE SERPL-SCNC: 108 MMOL/L (ref 98–107)
CO2 SERPL-SCNC: 25 MMOL/L (ref 21–32)
CO2 SERPL-SCNC: 26 MMOL/L (ref 21–32)
COLOR UR: ABNORMAL
CREAT SERPL-MCNC: 0.82 MG/DL (ref 0.5–1.3)
CREAT SERPL-MCNC: 0.87 MG/DL (ref 0.5–1.3)
DISPENSE STATUS: NORMAL
EGFRCR SERPLBLD CKD-EPI 2021: >90 ML/MIN/1.73M*2
EGFRCR SERPLBLD CKD-EPI 2021: >90 ML/MIN/1.73M*2
EJECTION FRACTION APICAL 4 CHAMBER: 59.7
EOSINOPHIL # BLD AUTO: 0.04 X10*3/UL (ref 0–0.7)
EOSINOPHIL # BLD AUTO: 0.32 X10*3/UL (ref 0–0.7)
EOSINOPHIL NFR BLD AUTO: 0.3 %
EOSINOPHIL NFR BLD AUTO: 3.3 %
ERYTHROCYTE [DISTWIDTH] IN BLOOD BY AUTOMATED COUNT: 16 % (ref 11.5–14.5)
ERYTHROCYTE [DISTWIDTH] IN BLOOD BY AUTOMATED COUNT: 16.3 % (ref 11.5–14.5)
FLOW: 50 LPM
FLOW: 50 LPM
FREQUENCY (BPM): 21 BPM
FREQUENCY (BPM): 31 BPM
GLUCOSE SERPL-MCNC: 195 MG/DL (ref 74–99)
GLUCOSE SERPL-MCNC: 196 MG/DL (ref 74–99)
GLUCOSE UR STRIP.AUTO-MCNC: ABNORMAL MG/DL
HCO3 BLDA-SCNC: 28.4 MMOL/L (ref 22–26)
HCO3 BLDA-SCNC: 29.2 MMOL/L (ref 22–26)
HCT VFR BLD AUTO: 22.7 % (ref 41–52)
HCT VFR BLD AUTO: 24.7 % (ref 41–52)
HCT VFR BLD AUTO: 25 % (ref 41–52)
HGB BLD-MCNC: 7.5 G/DL (ref 13.5–17.5)
HGB BLD-MCNC: 8.4 G/DL (ref 13.5–17.5)
HGB BLD-MCNC: 8.5 G/DL (ref 13.5–17.5)
HOLD SPECIMEN: NORMAL
HOLD SPECIMEN: NORMAL
HYALINE CASTS #/AREA URNS AUTO: ABNORMAL /LPF
IMM GRANULOCYTES # BLD AUTO: 0.1 X10*3/UL (ref 0–0.7)
IMM GRANULOCYTES # BLD AUTO: 0.22 X10*3/UL (ref 0–0.7)
IMM GRANULOCYTES NFR BLD AUTO: 1 % (ref 0–0.9)
IMM GRANULOCYTES NFR BLD AUTO: 1.4 % (ref 0–0.9)
INHALED O2 CONCENTRATION: 40 %
INHALED O2 CONCENTRATION: 70 %
INR PPP: 1.7 (ref 0.9–1.1)
INR PPP: 1.9 (ref 0.9–1.1)
INSPIRATORY/EXPIRATORY RATIO: ABNORMAL
INSPIRATORY/EXPIRATORY RATIO: ABNORMAL
KETONES UR STRIP.AUTO-MCNC: NEGATIVE MG/DL
LACTATE SERPL-SCNC: 1.9 MMOL/L (ref 0.4–2)
LACTATE SERPL-SCNC: 2.5 MMOL/L (ref 0.4–2)
LEFT VENTRICLE INTERNAL DIMENSION DIASTOLE: 4.85 CM (ref 3.5–6)
LEFT VENTRICULAR OUTFLOW TRACT DIAMETER: 2 CM
LEUKOCYTE ESTERASE UR QL STRIP.AUTO: NEGATIVE
LV EJECTION FRACTION BIPLANE: 60 %
LYMPHOCYTES # BLD AUTO: 0.64 X10*3/UL (ref 1.2–4.8)
LYMPHOCYTES # BLD AUTO: 0.74 X10*3/UL (ref 1.2–4.8)
LYMPHOCYTES NFR BLD AUTO: 4.7 %
LYMPHOCYTES NFR BLD AUTO: 6.6 %
MAGNESIUM SERPL-MCNC: 1.75 MG/DL (ref 1.6–2.4)
MCH RBC QN AUTO: 33.8 PG (ref 26–34)
MCH RBC QN AUTO: 34 PG (ref 26–34)
MCHC RBC AUTO-ENTMCNC: 33 G/DL (ref 32–36)
MCHC RBC AUTO-ENTMCNC: 34 G/DL (ref 32–36)
MCV RBC AUTO: 100 FL (ref 80–100)
MCV RBC AUTO: 102 FL (ref 80–100)
MONOCYTES # BLD AUTO: 0.6 X10*3/UL (ref 0.1–1)
MONOCYTES # BLD AUTO: 0.91 X10*3/UL (ref 0.1–1)
MONOCYTES NFR BLD AUTO: 5.7 %
MONOCYTES NFR BLD AUTO: 6.2 %
MRSA DNA SPEC QL NAA+PROBE: NOT DETECTED
MUCOUS THREADS #/AREA URNS AUTO: ABNORMAL /LPF
NEUTROPHILS # BLD AUTO: 13.97 X10*3/UL (ref 1.2–7.7)
NEUTROPHILS # BLD AUTO: 7.97 X10*3/UL (ref 1.2–7.7)
NEUTROPHILS NFR BLD AUTO: 82.6 %
NEUTROPHILS NFR BLD AUTO: 87.7 %
NITRITE UR QL STRIP.AUTO: NEGATIVE
NRBC BLD-RTO: 0.3 /100 WBCS (ref 0–0)
NRBC BLD-RTO: 0.4 /100 WBCS (ref 0–0)
OXYHGB MFR BLDA: 97.5 % (ref 94–98)
OXYHGB MFR BLDA: 97.7 % (ref 94–98)
PCO2 BLDA: 40 MM HG (ref 38–42)
PCO2 BLDA: 44 MM HG (ref 38–42)
PEAK PRESSURE: 15 CM H2O
PEAK PRESSURE: 29 CM H2O
PEEP CMH2O: 5 CM H2O
PH BLDA: 7.43 PH (ref 7.38–7.42)
PH BLDA: 7.46 PH (ref 7.38–7.42)
PH UR STRIP.AUTO: 5 [PH]
PHOSPHATE SERPL-MCNC: 3 MG/DL (ref 2.5–4.9)
PLATELET # BLD AUTO: 73 X10*3/UL (ref 150–450)
PLATELET # BLD AUTO: 75 X10*3/UL (ref 150–450)
PO2 BLDA: 189 MM HG (ref 85–95)
PO2 BLDA: 269 MM HG (ref 85–95)
POTASSIUM SERPL-SCNC: 4 MMOL/L (ref 3.5–5.3)
POTASSIUM SERPL-SCNC: 4.1 MMOL/L (ref 3.5–5.3)
PRODUCT BLOOD TYPE: 6200
PRODUCT BLOOD TYPE: 6200
PRODUCT BLOOD TYPE: 7300
PRODUCT BLOOD TYPE: 7300
PRODUCT CODE: NORMAL
PROT SERPL-MCNC: 5.4 G/DL (ref 6.4–8.2)
PROT UR STRIP.AUTO-MCNC: ABNORMAL MG/DL
PROTHROMBIN TIME: 19.6 SECONDS (ref 9.8–12.8)
PROTHROMBIN TIME: 21.5 SECONDS (ref 9.8–12.8)
RBC # BLD AUTO: 2.22 X10*6/UL (ref 4.5–5.9)
RBC # BLD AUTO: 2.47 X10*6/UL (ref 4.5–5.9)
RBC # UR STRIP.AUTO: ABNORMAL /UL
RBC #/AREA URNS AUTO: >20 /HPF
RH FACTOR (ANTIGEN D): NORMAL
RIGHT VENTRICLE FREE WALL PEAK S': 23 CM/S
RIGHT VENTRICLE PEAK SYSTOLIC PRESSURE: 24.3 MMHG
SAO2 % BLDA: 100 % (ref 94–100)
SAO2 % BLDA: 100 % (ref 94–100)
SODIUM SERPL-SCNC: 138 MMOL/L (ref 136–145)
SODIUM SERPL-SCNC: 141 MMOL/L (ref 136–145)
SP GR UR STRIP.AUTO: 1.03
SPECIMEN DRAWN FROM PATIENT: ABNORMAL
SPECIMEN DRAWN FROM PATIENT: ABNORMAL
SPONTANEOUS TIDAL VOLUME: 499 ML
SPONTANEOUS TIDAL VOLUME: 502 ML
SQUAMOUS #/AREA URNS AUTO: ABNORMAL /HPF
TIDAL VOLUME: 480 ML
TIDAL VOLUME: 480 ML
TOTAL MINUTE VOLUME: 10.5 LITER
TOTAL MINUTE VOLUME: 14.9 LITER
TRICUSPID ANNULAR PLANE SYSTOLIC EXCURSION: 2.3 CM
UNIT ABO: NORMAL
UNIT NUMBER: NORMAL
UNIT RH: NORMAL
UNIT VOLUME: 221
UNIT VOLUME: 222
UNIT VOLUME: 326
UNIT VOLUME: 356
UROBILINOGEN UR STRIP.AUTO-MCNC: 4 MG/DL
VANCOMYCIN SERPL-MCNC: 29.1 UG/ML (ref 5–20)
VENTILATOR MODE: ABNORMAL
VENTILATOR MODE: ABNORMAL
VENTILATOR RATE: 16 BPM
VENTILATOR RATE: 16 BPM
WBC # BLD AUTO: 15.9 X10*3/UL (ref 4.4–11.3)
WBC # BLD AUTO: 9.7 X10*3/UL (ref 4.4–11.3)
WBC #/AREA URNS AUTO: ABNORMAL /HPF

## 2024-04-13 PROCEDURE — 83605 ASSAY OF LACTIC ACID: CPT

## 2024-04-13 PROCEDURE — 36430 TRANSFUSION BLD/BLD COMPNT: CPT

## 2024-04-13 PROCEDURE — 99232 SBSQ HOSP IP/OBS MODERATE 35: CPT | Performed by: INTERNAL MEDICINE

## 2024-04-13 PROCEDURE — 84100 ASSAY OF PHOSPHORUS: CPT

## 2024-04-13 PROCEDURE — 99223 1ST HOSP IP/OBS HIGH 75: CPT | Performed by: PHYSICIAN ASSISTANT

## 2024-04-13 PROCEDURE — C9113 INJ PANTOPRAZOLE SODIUM, VIA: HCPCS | Performed by: STUDENT IN AN ORGANIZED HEALTH CARE EDUCATION/TRAINING PROGRAM

## 2024-04-13 PROCEDURE — 2500000004 HC RX 250 GENERAL PHARMACY W/ HCPCS (ALT 636 FOR OP/ED)

## 2024-04-13 PROCEDURE — 70450 CT HEAD/BRAIN W/O DYE: CPT | Performed by: RADIOLOGY

## 2024-04-13 PROCEDURE — 83735 ASSAY OF MAGNESIUM: CPT

## 2024-04-13 PROCEDURE — 2500000005 HC RX 250 GENERAL PHARMACY W/O HCPCS: Performed by: ANESTHESIOLOGY

## 2024-04-13 PROCEDURE — 85025 COMPLETE CBC W/AUTO DIFF WBC: CPT | Performed by: INTERNAL MEDICINE

## 2024-04-13 PROCEDURE — 99291 CRITICAL CARE FIRST HOUR: CPT | Performed by: INTERNAL MEDICINE

## 2024-04-13 PROCEDURE — 76937 US GUIDE VASCULAR ACCESS: CPT

## 2024-04-13 PROCEDURE — 80202 ASSAY OF VANCOMYCIN: CPT | Performed by: STUDENT IN AN ORGANIZED HEALTH CARE EDUCATION/TRAINING PROGRAM

## 2024-04-13 PROCEDURE — 93306 TTE W/DOPPLER COMPLETE: CPT | Performed by: INTERNAL MEDICINE

## 2024-04-13 PROCEDURE — 2500000005 HC RX 250 GENERAL PHARMACY W/O HCPCS: Performed by: STUDENT IN AN ORGANIZED HEALTH CARE EDUCATION/TRAINING PROGRAM

## 2024-04-13 PROCEDURE — 86901 BLOOD TYPING SEROLOGIC RH(D): CPT | Performed by: STUDENT IN AN ORGANIZED HEALTH CARE EDUCATION/TRAINING PROGRAM

## 2024-04-13 PROCEDURE — 82805 BLOOD GASES W/O2 SATURATION: CPT | Performed by: STUDENT IN AN ORGANIZED HEALTH CARE EDUCATION/TRAINING PROGRAM

## 2024-04-13 PROCEDURE — 84484 ASSAY OF TROPONIN QUANT: CPT

## 2024-04-13 PROCEDURE — 2500000004 HC RX 250 GENERAL PHARMACY W/ HCPCS (ALT 636 FOR OP/ED): Performed by: STUDENT IN AN ORGANIZED HEALTH CARE EDUCATION/TRAINING PROGRAM

## 2024-04-13 PROCEDURE — P9017 PLASMA 1 DONOR FRZ W/IN 8 HR: HCPCS

## 2024-04-13 PROCEDURE — 86920 COMPATIBILITY TEST SPIN: CPT

## 2024-04-13 PROCEDURE — 2500000001 HC RX 250 WO HCPCS SELF ADMINISTERED DRUGS (ALT 637 FOR MEDICARE OP): Performed by: STUDENT IN AN ORGANIZED HEALTH CARE EDUCATION/TRAINING PROGRAM

## 2024-04-13 PROCEDURE — 80048 BASIC METABOLIC PNL TOTAL CA: CPT | Mod: CCI

## 2024-04-13 PROCEDURE — 85610 PROTHROMBIN TIME: CPT | Performed by: STUDENT IN AN ORGANIZED HEALTH CARE EDUCATION/TRAINING PROGRAM

## 2024-04-13 PROCEDURE — 70450 CT HEAD/BRAIN W/O DYE: CPT

## 2024-04-13 PROCEDURE — 99291 CRITICAL CARE FIRST HOUR: CPT

## 2024-04-13 PROCEDURE — 85025 COMPLETE CBC W/AUTO DIFF WBC: CPT

## 2024-04-13 PROCEDURE — P9073 PLATELETS PHERESIS PATH REDU: HCPCS

## 2024-04-13 PROCEDURE — 36600 WITHDRAWAL OF ARTERIAL BLOOD: CPT

## 2024-04-13 PROCEDURE — 83605 ASSAY OF LACTIC ACID: CPT | Performed by: STUDENT IN AN ORGANIZED HEALTH CARE EDUCATION/TRAINING PROGRAM

## 2024-04-13 PROCEDURE — 2020000001 HC ICU ROOM DAILY

## 2024-04-13 PROCEDURE — 2500000004 HC RX 250 GENERAL PHARMACY W/ HCPCS (ALT 636 FOR OP/ED): Performed by: ANESTHESIOLOGY

## 2024-04-13 PROCEDURE — 93306 TTE W/DOPPLER COMPLETE: CPT

## 2024-04-13 PROCEDURE — 36415 COLL VENOUS BLD VENIPUNCTURE: CPT | Performed by: STUDENT IN AN ORGANIZED HEALTH CARE EDUCATION/TRAINING PROGRAM

## 2024-04-13 PROCEDURE — 80053 COMPREHEN METABOLIC PANEL: CPT | Performed by: INTERNAL MEDICINE

## 2024-04-13 PROCEDURE — 94003 VENT MGMT INPAT SUBQ DAY: CPT

## 2024-04-13 PROCEDURE — C9113 INJ PANTOPRAZOLE SODIUM, VIA: HCPCS

## 2024-04-13 PROCEDURE — 99221 1ST HOSP IP/OBS SF/LOW 40: CPT | Performed by: NEUROLOGICAL SURGERY

## 2024-04-13 RX ORDER — MIDAZOLAM HYDROCHLORIDE 1 MG/ML
INJECTION, SOLUTION INTRAMUSCULAR; INTRAVENOUS
Status: COMPLETED
Start: 2024-04-13 | End: 2024-04-13

## 2024-04-13 RX ORDER — MAGNESIUM SULFATE HEPTAHYDRATE 40 MG/ML
4 INJECTION, SOLUTION INTRAVENOUS ONCE
Status: COMPLETED | OUTPATIENT
Start: 2024-04-13 | End: 2024-04-14

## 2024-04-13 RX ORDER — PANTOPRAZOLE SODIUM 40 MG/10ML
40 INJECTION, POWDER, LYOPHILIZED, FOR SOLUTION INTRAVENOUS EVERY 12 HOURS
Status: COMPLETED | OUTPATIENT
Start: 2024-04-13 | End: 2024-04-16

## 2024-04-13 RX ORDER — LEVETIRACETAM 5 MG/ML
500 INJECTION INTRAVASCULAR EVERY 12 HOURS
Status: DISCONTINUED | OUTPATIENT
Start: 2024-04-13 | End: 2024-04-18

## 2024-04-13 RX ORDER — MIDAZOLAM HYDROCHLORIDE 1 MG/ML
2 INJECTION, SOLUTION INTRAMUSCULAR; INTRAVENOUS ONCE
Status: COMPLETED | OUTPATIENT
Start: 2024-04-13 | End: 2024-04-13

## 2024-04-13 RX ORDER — PANTOPRAZOLE SODIUM 40 MG/10ML
80 INJECTION, POWDER, LYOPHILIZED, FOR SOLUTION INTRAVENOUS ONCE
Status: COMPLETED | OUTPATIENT
Start: 2024-04-13 | End: 2024-04-13

## 2024-04-13 RX ORDER — PANTOPRAZOLE SODIUM 40 MG/1
40 TABLET, DELAYED RELEASE ORAL EVERY 12 HOURS
Status: DISCONTINUED | OUTPATIENT
Start: 2024-04-16 | End: 2024-04-13

## 2024-04-13 RX ORDER — OCTREOTIDE ACETATE 100 UG/ML
50 INJECTION, SOLUTION INTRAVENOUS; SUBCUTANEOUS ONCE
Qty: 0.5 ML | Refills: 0 | Status: COMPLETED | OUTPATIENT
Start: 2024-04-13 | End: 2024-04-13

## 2024-04-13 RX ADMIN — MIDAZOLAM HYDROCHLORIDE 2 MG: 1 INJECTION, SOLUTION INTRAMUSCULAR; INTRAVENOUS at 01:07

## 2024-04-13 RX ADMIN — LEVETIRACETAM 500 MG: 5 INJECTION INTRAVENOUS at 16:01

## 2024-04-13 RX ADMIN — LACTULOSE 20 G: 20 SOLUTION ORAL at 21:10

## 2024-04-13 RX ADMIN — PANTOPRAZOLE SODIUM 80 MG: 40 INJECTION, POWDER, FOR SOLUTION INTRAVENOUS at 02:01

## 2024-04-13 RX ADMIN — Medication: at 20:00

## 2024-04-13 RX ADMIN — CEFEPIME 500 MG: 1 INJECTION, POWDER, FOR SOLUTION INTRAMUSCULAR; INTRAVENOUS at 01:06

## 2024-04-13 RX ADMIN — MIDAZOLAM 2 MG/HR: 5 INJECTION INTRAMUSCULAR; INTRAVENOUS at 01:46

## 2024-04-13 RX ADMIN — OCTREOTIDE ACETATE 50 MCG/HR: 1000 INJECTION, SOLUTION INTRAVENOUS; SUBCUTANEOUS at 22:54

## 2024-04-13 RX ADMIN — DEXMEDETOMIDINE HYDROCHLORIDE 0.1 MCG/KG/HR: 400 INJECTION INTRAVENOUS at 16:23

## 2024-04-13 RX ADMIN — Medication: at 08:00

## 2024-04-13 RX ADMIN — OCTREOTIDE ACETATE 50 MCG/HR: 1000 INJECTION, SOLUTION INTRAVENOUS; SUBCUTANEOUS at 12:50

## 2024-04-13 RX ADMIN — THIAMINE HYDROCHLORIDE 200 MG: 100 INJECTION, SOLUTION INTRAMUSCULAR; INTRAVENOUS at 21:08

## 2024-04-13 RX ADMIN — Medication 0.02 MCG/KG/MIN: at 01:06

## 2024-04-13 RX ADMIN — MAGNESIUM SULFATE IN WATER 4 G: 4 INJECTION, SOLUTION INTRAVENOUS at 21:50

## 2024-04-13 RX ADMIN — THIAMINE HYDROCHLORIDE 200 MG: 100 INJECTION, SOLUTION INTRAMUSCULAR; INTRAVENOUS at 14:36

## 2024-04-13 RX ADMIN — POTASSIUM CHLORIDE 20 MEQ: 14.9 INJECTION, SOLUTION INTRAVENOUS at 01:15

## 2024-04-13 RX ADMIN — VANCOMYCIN HYDROCHLORIDE 1250 MG: 1.25 INJECTION, POWDER, LYOPHILIZED, FOR SOLUTION INTRAVENOUS at 16:02

## 2024-04-13 RX ADMIN — OCTREOTIDE ACETATE 50 MCG/HR: 1000 INJECTION, SOLUTION INTRAVENOUS; SUBCUTANEOUS at 02:30

## 2024-04-13 RX ADMIN — PANTOPRAZOLE SODIUM 40 MG: 40 INJECTION, POWDER, FOR SOLUTION INTRAVENOUS at 12:15

## 2024-04-13 RX ADMIN — LACTULOSE 20 G: 20 SOLUTION ORAL at 15:05

## 2024-04-13 RX ADMIN — VANCOMYCIN HYDROCHLORIDE 1250 MG: 1.25 INJECTION, POWDER, LYOPHILIZED, FOR SOLUTION INTRAVENOUS at 02:20

## 2024-04-13 RX ADMIN — CEFEPIME 500 MG: 1 INJECTION, POWDER, FOR SOLUTION INTRAMUSCULAR; INTRAVENOUS at 17:37

## 2024-04-13 RX ADMIN — OCTREOTIDE ACETATE 50 MCG: 100 INJECTION, SOLUTION INTRAVENOUS; SUBCUTANEOUS at 02:24

## 2024-04-13 RX ADMIN — CEFEPIME 500 MG: 1 INJECTION, POWDER, FOR SOLUTION INTRAMUSCULAR; INTRAVENOUS at 09:34

## 2024-04-13 RX ADMIN — PHYTONADIONE 10 MG: 10 INJECTION, EMULSION INTRAMUSCULAR; INTRAVENOUS; SUBCUTANEOUS at 04:37

## 2024-04-13 RX ADMIN — MIDAZOLAM 2 MG: 1 INJECTION INTRAMUSCULAR; INTRAVENOUS at 01:07

## 2024-04-13 ASSESSMENT — LIFESTYLE VARIABLES
HEADACHE, FULLNESS IN HEAD: NOT PRESENT
ANXIETY: MILDLY ANXIOUS
AUDITORY DISTURBANCES: NOT PRESENT
NAUSEA AND VOMITING: NO NAUSEA AND NO VOMITING
AGITATION: SOMEWHAT MORE THAN NORMAL ACTIVITY
ORIENTATION AND CLOUDING OF SENSORIUM: CANNOT DO SERIAL ADDITIONS OR IS UNCERTAIN ABOUT DATE
VISUAL DISTURBANCES: NOT PRESENT
TOTAL SCORE: 3
PAROXYSMAL SWEATS: NO SWEAT VISIBLE
TREMOR: NO TREMOR

## 2024-04-13 ASSESSMENT — PAIN - FUNCTIONAL ASSESSMENT
PAIN_FUNCTIONAL_ASSESSMENT: CPOT (CRITICAL CARE PAIN OBSERVATION TOOL)
PAIN_FUNCTIONAL_ASSESSMENT: CPOT (CRITICAL CARE PAIN OBSERVATION TOOL)

## 2024-04-13 ASSESSMENT — PAIN SCALES - GENERAL: PAINLEVEL_OUTOF10: 0 - NO PAIN

## 2024-04-13 NOTE — PROGRESS NOTES
"Covering for Dr. Ca    Subjective Data:  Intubated and sedated    Overnight Events:    Asked to see the patient secondary to arrest on the floor with ventricular tachycardia.  Patient's potassium was noted to be 3.2.  Patient underwent CPR with 1 round of epinephrine with ROSC.  Currently intubated and sedated on low-dose Levophed.     Objective Data:  Last Recorded Vitals:  Vitals:    04/13/24 0924 04/13/24 0939 04/13/24 1045 04/13/24 1151   BP: 119/61 133/60 133/60 127/61   Pulse: 87 104 104 103   Resp: 21 (!) 30 (!) 30 (!) 30   Temp: 36 °C (96.8 °F) 36.4 °C (97.5 °F) 36.4 °C (97.5 °F) 36.5 °C (97.7 °F)   TempSrc: Tympanic Tympanic Tympanic Tympanic   SpO2: 96% (!) 40% (!) 40% 94%   Weight:       Height:           Last Labs:  CBC - 4/13/2024:  3:52 AM  15.9 8.4 73    24.7      CMP - 4/13/2024:  3:52 AM  8.5 5.4 150 --- 6.4   _ 2.6 43 139      PTT - 4/13/2024:  3:52 AM  1.9   21.5 41     TROPHS   Date/Time Value Ref Range Status   04/12/2024 10:33  0 - 20 ng/L Final   04/12/2024 06:40 PM 47 0 - 20 ng/L Final   04/12/2024 05:48 PM 36 0 - 20 ng/L Final     BNP   Date/Time Value Ref Range Status   04/10/2024 10:03  0 - 99 pg/mL Final     VLDL   Date/Time Value Ref Range Status   09/24/2021 07:39 AM 28 0 - 40 mg/dL Final   07/17/2020 10:23 AM 11 0 - 40 mg/dL Final      Last I/O:  I/O last 3 completed shifts:  In: 1133.6 (11.9 mL/kg) [P.O.:620; I.V.:213.6 (2.2 mL/kg); Blood:300]  Out: 125 (1.3 mL/kg) [Urine:125 (0 mL/kg/hr)]  Weight: 95 kg     Past Cardiology Tests (Last 3 Years):  EKG:  Electrocardiogram, 12-lead PRN ACS symptoms 04/11/2024 (Preliminary)      ECG 12 lead 04/10/2024    Echo:  Transthoracic Echo (TTE) Complete 04/13/2024  CONCLUSIONS:   1. Left ventricular systolic function is normal with a 60-65% estimated ejection fraction.   2. RVSP within normal limits.    Ejection Fractions:  No results found for: \"EF\"  Cath:  No results found for this or any previous visit from the past 1095 " days.    Stress Test:  No results found for this or any previous visit from the past 1095 days.    Cardiac Imaging:  No results found for this or any previous visit from the past 1095 days.      Inpatient Medications:  Scheduled medications   Medication Dose Route Frequency    [Held by provider] allopurinol  300 mg oral Daily    [Held by provider] busPIRone  30 mg nasogastric tube q8h MICKEY    cefepime  500 mg intravenous q8h    folic acid  1 mg oral Daily    lactulose  20 g oral TID    multivitamin with minerals  1 tablet oral Daily    oxygen   inhalation Continuous - Inhalation    pantoprazole  40 mg intravenous q12h    tamsulosin  0.4 mg oral Daily    thiamine  200 mg intravenous TID    vancomycin  1,250 mg intravenous q12h     PRN medications   Medication    acetaminophen    meperidine    vancomycin     Continuous Medications   Medication Dose Last Rate    dexmedeTOMIDine  0.1-1.5 mcg/kg/hr 0.2 mcg/kg/hr (04/12/24 2301)    dextrose 5 % and lactated Ringer's  125 mL/hr 125 mL/hr (04/12/24 0446)    octreotide  50 mcg/hr 50 mcg/hr (04/13/24 0800)       Physical Exam:  Physical Exam  Vitals reviewed.   Constitutional:       Interventions: He is sedated and intubated.   HENT:      Head: Normocephalic and atraumatic.      Mouth/Throat:      Mouth: Mucous membranes are moist.      Pharynx: Oropharynx is clear.   Eyes:      General: Scleral icterus present.      Extraocular Movements: Extraocular movements intact.      Conjunctiva/sclera: Conjunctivae normal.   Cardiovascular:      Rate and Rhythm: Normal rate and regular rhythm.      Pulses: Normal pulses.      Heart sounds: Normal heart sounds.   Pulmonary:      Effort: Pulmonary effort is normal. He is intubated.      Breath sounds: Normal breath sounds.   Abdominal:      General: Bowel sounds are normal.      Palpations: Abdomen is soft.   Musculoskeletal:      Cervical back: Neck supple.   Skin:     General: Skin is warm and dry.      Coloration: Skin is jaundiced.    Neurological:      General: No focal deficit present.   Psychiatric:         Mood and Affect: Mood normal.         Behavior: Behavior normal.            Assessment/Plan   4/13/24  1.  Ventricular tachycardia/Torsades: Unclear etiology.  Suspect related to hypokalemia.  Has not recurred since admission to the ICU.  Cannot discount may related to atrial fibrillation/flutter.  2.  PAF: High risk for anticoagulation.  Heart rates currently stable.  Restart beta-blockade once pressors have been stopped.  3.  AV block status post pacemaker implantation  4.  Hypertension  5.  History of GI bleeding  6.  Alcohol abuse.    Peripheral IV 04/12/24 22 G Right;Anterior Forearm (Active)   Site Assessment Clean;Dry;Intact 04/13/24 0900   Dressing Status Clean;Dry;Occlusive 04/13/24 0900   Number of days: 1       Peripheral IV 04/12/24 20 G Left Forearm (Active)   Site Assessment Clean;Dry;Intact 04/13/24 0900   Dressing Status Clean;Dry;Occlusive 04/13/24 0900   Number of days: 1       Peripheral IV 04/12/24 20 G Left Antecubital (Active)   Site Assessment Clean;Dry;Intact 04/13/24 0900   Dressing Status Clean;Dry;Occlusive 04/13/24 0900   Number of days: 1       Peripheral IV 04/13/24 18 G Right;Upper;Anterior Arm (Active)   Site Assessment Clean;Dry;Intact 04/13/24 0900   Dressing Status Clean;Dry;Occlusive 04/13/24 0900   Number of days: 0       ETT  7.5 mm (Active)   Secured at (cm) 27 cm 04/13/24 1019   Measured from Lips 04/13/24 1019   Secured Location Center 04/13/24 1019   Secured by Commercial tube castro 04/13/24 1019   Site Condition Cool;Dry 04/13/24 1019   Number of days: 1       NG/OG/Feeding Tube Right nostril (Active)   Site Assessment Clean;Dry;Intact 04/13/24 0549   Number of days: 1       Urethral Catheter Non-latex 16 Fr. (Active)   Site Assessment Clean;Skin intact 04/13/24 1116   Number of days: 1       Code Status:  Full Code    Tyrone Carrillo MD

## 2024-04-13 NOTE — CONSULTS
Reason For Consult  Acute subdural hematoma    History Of Present Illness  Trey Borjas is a 64 y.o. male with a history of of alcohol abuse, hypertension, gout, atrial fibrillation with complete heart block status post pacemaker, peripheral neuropathy, and smoking, who presented to the hospital on 4/10/2024 with bilateral lower extremity pain related to his peripheral neuropathy as well as assistance with alcohol detox.  On the night of 4/12/2024, he developed pulseless V. tach and was subsequently coded with ROSC after approximately 1 minute of CPR and 1 unit epinephrine.  In preparation for possible anticoagulation, a CT of the head without contrast was performed, which I personally reviewed and interpreted.  This shows a small acute subdural hematoma overlying the right posterior convexity.  There is no significant mass effect.  The patient has a history of thrombocytopenia and coagulopathy.  He received FFP overnight.  Repeat CT of the head this morning was stable.  At present, the patient remains intubated in the ICU.     Past Medical History  He has a past medical history of Alcohol abuse with withdrawal, unspecified (Multi), Personal history of other diseases of the circulatory system, Personal history of other diseases of the nervous system and sense organs (07/02/2020), Personal history of other diseases of urinary system, Post covid-19 condition, unspecified (12/16/2021), Strain of muscle and tendon of unspecified wall of thorax, initial encounter (09/27/2016), and Unspecified otitis externa, unspecified ear (08/11/2016).    Surgical History  He has a past surgical history that includes Other surgical history (07/17/2020) and Other surgical history (07/17/2020).     Social History  He reports that he has never smoked. He uses smokeless tobacco. He reports current alcohol use of about 56.0 standard drinks of alcohol per week. He reports that he does not use drugs.    Family History  No family history on  "file.     Allergies  Penicillins, Erythromycin, and Pollen extracts    Review of Systems  Unobtainable     Physical Exam  Intubated, sedated  Eyes open to voice  Extraocular movements intact  Pupils equal round and reactive to light  Nods yes and no to basic questions, denies pain  Follow simple commands in all 4 extremities     Last Recorded Vitals  Blood pressure 127/61, pulse 103, temperature 36.5 °C (97.7 °F), temperature source Tympanic, resp. rate (!) 30, height 1.829 m (6' 0.01\"), weight 95 kg (209 lb 7 oz), SpO2 94%.    Relevant Results  See HPI     Assessment/Plan   Trey Borjas is a 64 y.o. male with a history of of alcohol abuse, hypertension, gout, atrial fibrillation with complete heart block status post pacemaker, peripheral neuropathy, and smoking, who presented to the hospital on 4/10/2024 with bilateral lower extremity pain related to his peripheral neuropathy as well as assistance with alcohol detox.  On the night of 4/12/2024, he developed pulseless V. tach and was subsequently coded with ROSC after approximately 1 minute of CPR and 1 unit epinephrine.  He was found to have a small acute subdural hematoma overlying the right posterior convexity without any significant mass effect.  He received FFP overnight.  Repeat imaging was stable.  His neurologic exam is reassuring.    Plan:  - Platelet goal greater than 75  - INR goal less than 1.6, agree with IV vitamin K  - Recommend repeat CT head/14/20 4 AM  - Okay for DVT prophylaxis if necessary  - Any full dose anticoagulation would involve treatment discussion with cardiology and weighing of risks and benefits  - Recommend seizure prophylaxis with Keppra 500 mg twice daily IV/p.o.  - Neurosurgery will continue to follow    I spent 45 minutes in the professional and overall care of this patient.      Cal Sahu MD PhD    "

## 2024-04-13 NOTE — CONSULTS
"Vancomycin Dosing by Pharmacy- INITIAL    Trey Borjas is a 63 y.o. year old male who Pharmacy has been consulted for vancomycin dosing for pneumonia. Based on the patient's indication and renal status this patient will be dosed based on a goal AUC of 400-600.     Renal function is currently stable.  Crcl = 99.2 ml/min    Visit Vitals  BP 88/53   Pulse 109   Temp 37.6 °C (99.7 °F)   Resp (!) 27        Lab Results   Component Value Date    CREATININE 0.90 04/12/2024    CREATININE 0.76 04/12/2024    CREATININE 0.76 04/12/2024    CREATININE 0.77 04/12/2024        Patient weight is 95 kg    No results found for: \"CULTURE\"     I/O last 3 completed shifts:  In: 820 (8.5 mL/kg) [P.O.:620; I.V.:200 (2.1 mL/kg)]  Out: - (0 mL/kg)   Weight: 96.2 kg   [unfilled]    Lab Results   Component Value Date    PATIENTTEMP 37.0 04/12/2024          Assessment/Plan     Patient will not be given a loading dose.  Will initiate vancomycin maintenance,  1250 mg every 12 hours.    This dosing regimen is predicted by [employer] to result in the following pharmacokinetic parameters:    Regimen: 1250 mg IV every 12 hours.  Start time: 21:51 on 04/12/2024  Exposure target: AUC24 (range)400-600 mg/L.hr   AUC24,ss: 541 mg/L.hr  Probability of AUC24 > 400: 80 %  Ctrough,ss: 17.1 mg/L  Probability of Ctrough,ss > 20: 36 %  Probability of nephrotoxicity (Lodise ALYSSA 2009): 13 %    Follow-up level will be ordered on 4/13 at 0500 unless clinically indicated sooner.  Will continue to monitor renal function daily while on vancomycin and order serum creatinine at least every 48 hours if not already ordered.  Follow for continued vancomycin needs, clinical response, and signs/symptoms of toxicity.       Morena Davidson, PharmD       "

## 2024-04-13 NOTE — CONSULTS
Department of Internal Medicine  Gastroenterology  Consult note      Reason for Consult: Upper GI bleed in a cirrhotic patient    Chief Complaint: Alcohol intoxication     History Obtained from: chart review, patient unable to participate due to altered mental status    History of Present Illness      The patient is a 64 y.o. male with signficant past medical history of hypertension, gout, alcohol abuse, complete heart block status post pacemaker, NAE, and neuropathy who presents for alcohol intoxication.  He was found to be in a flutter in the ER and cardiology was consulted.  Original plan was to discharge and follow-up as outpatient but patient went through withdrawal. On 4/12 patient had a run of V. tach and CODE BLUE was called.  CPR was initiated.  ROSC was obtained and patient was sent to ICU intubated.  Went for CT head which found a subdural hemorrhage in the right posterior cerebral hemisphere.  Neurosurgery was called who did not recommend surgery.  An NG tube was placed and he was noted to have some blood.  PPI and octreotide were started.    Discussed with ICU nurse Felipe, about 150 mL of dark red blood from OG.  No melena.    Allergies  Penicillins, Erythromycin, and Pollen extracts    Current Medication    Current Facility-Administered Medications:     acetaminophen (Tylenol) oral liquid 650 mg, 650 mg, [facility] tube, [facility]    acetaminophen (Tylenol) tablet 650 mg, 650 mg, oral, q6h PRN, Latoya Aleman MD    [Transfer Hold] allopurinol (Zyloprim) tablet 300 mg, 300 mg, oral, Daily, Sergei Gillis DO, 300 mg at 04/11/24 0857    busPIRone (Buspar) tablet 30 mg, 30 mg, nasogastric tube, q8h [facility]    cefepime (Maxipime) 500 mg in dextrose 5 % in water (D5W) 50 mL IV, 500 mg, intravenous, q8h, Latoya Aleman MD, Stopped at 04/13/24 0136    dexmedeTOMIDine (Precedex) 400 mcg in 100 mL (4 mcg/mL) sodium chloride 0.9% infusion, 0.1-1.5 mcg/kg/hr,  intravenous, Continuous, Latoya Aleman MD, Last Rate: 4.81 mL/hr at 04/12/24 2301, 0.2 mcg/kg/hr at 04/12/24 2301    dextrose 5 % and lactated Ringer's infusion, 125 mL/hr, intravenous, Continuous, Sergei Gillis DO, Last Rate: 125 mL/hr at 04/12/24 0446, 125 mL/hr at 04/12/24 0446    [Transfer Hold] folic acid (Folvite) tablet 1 mg, 1 mg, oral, Daily, Sergei Gillis DO, 1 mg at 04/12/24 1124    [Transfer Hold] gabapentin (Neurontin) capsule 100 mg, 100 mg, oral, BID, Sergei Gillis DO, 100 mg at 04/12/24 1238    [Transfer Hold] gabapentin (Neurontin) capsule 300 mg, 300 mg, oral, Nightly, Sergei Gillis DO, 300 mg at 04/10/24 2025    [Transfer Hold] guaiFENesin (Mucinex) 12 hr tablet 600 mg, 600 mg, oral, q12h PRN, Sergei Gillis DO    [Transfer Hold] lactulose 20 gram/30 mL oral solution 20 g, 20 g, oral, TID, Sergei Gillis DO, 20 g at 04/12/24 1518    [Transfer Hold] melatonin tablet 3 mg, 3 mg, oral, Nightly PRN, Sergei Gillis DO, 3 mg at 04/10/24 2025    meperidine PF (Demerol) injection 12.5 mg, 12.5 mg, intravenous, q4h PRN, Latoya Aleman MD    [Transfer Hold] metoprolol tartrate (Lopressor) tablet 50 mg, 50 mg, oral, BID, Sergei Gillis DO, 50 mg at 04/11/24 0857    midazolam (Versed) 100 mg in dextrose 5 % in water (D5W) 100 mL (1 mg/mL) infusion, 0.5-20 mg/hr, intravenous, Continuous, Latoya Aleman MD, Last Rate: 2 mL/hr at 04/13/24 0146, 2 mg/hr at 04/13/24 0146    [Transfer Hold] multivitamin with minerals 1 tablet, 1 tablet, oral, Daily, Sergei Gillis, DO, 1 tablet at 04/12/24 1142    norepinephrine (Levophed) 8 mg in sodium chloride 0.9% 250 mL (0.032 mg/mL) infusion (premix), 0.01-1 mcg/kg/min, intravenous, Continuous, Latoya Aleman MD, Last Rate: 3.56 mL/hr at 04/13/24 0106, 0.02 mcg/kg/min at 04/13/24 0106    octreotide (SandoSTATIN) 500 mcg in sodium chloride 0.9% 100 mL (5 mcg/mL) infusion, 50 mcg/hr, intravenous, Continuous, Latoya Aleman MD,  Last Rate: 10 mL/hr at 04/13/24 0230, 50 mcg/hr at 04/13/24 0230    oxygen (O2) therapy, , inhalation, Continuous - Inhalation, Leopoldo Goodwin MD, Rate Change at 04/13/24 0520    pantoprazole (ProtoNix) injection 40 mg, 40 mg, intravenous, q12h **FOLLOWED BY** [START ON 4/16/2024] pantoprazole (ProtoNix) EC tablet 40 mg, 40 mg, oral, q12h, Latoay Aleman MD    [Transfer Hold] polyethylene glycol (Glycolax, Miralax) packet 17 g, 17 g, oral, Daily PRN, Sergei Gillis DO    sennosides-docusate sodium (Veronica-Colace) 8.6-50 mg per tablet 1 tablet, 1 tablet, oral, Nightly, Latoya Aleman MD    [Transfer Hold] tamsulosin (Flomax) 24 hr capsule 0.4 mg, 0.4 mg, oral, Daily, Sergei Gillis DO, 0.4 mg at 04/12/24 1124    [Transfer Hold] thiamine (Vitamin B-1) tablet 100 mg, 100 mg, oral, Daily, Sergei Gillis DO, 100 mg at 04/12/24 1125    vancomycin (Vancocin) in dextrose 5 % water (D5W) 250 mL IV 1,250 mg, 1,250 mg, intravenous, q12h, Latoya Aleman MD, Stopped at 04/13/24 0335    vancomycin (Vancocin) pharmacy to dose - pharmacy monitoring, , miscellaneous, Daily PRN, Latoya Aleman MD    Past Medical History  Active Ambulatory Problems     Diagnosis Date Noted    Male erectile disorder 07/27/2023    Abnormal LFTs 07/27/2023    Alcohol abuse 07/27/2023    Benign essential HTN 07/27/2023    Bilateral plantar fasciitis 07/27/2023    Degeneration of intervertebral disc of lumbar region 07/27/2023    Elevated PSA 07/27/2023    Esophagitis 07/27/2023    Gout 10/02/2018    Hepatic steatosis 07/27/2023    Neuropathy, peripheral 07/27/2023    Pacemaker 07/27/2023    Prostate cancer (Multi) 07/27/2023    Spinal stenosis of lumbar region 07/27/2023    Chronic obstructive pulmonary disease with (acute) lower respiratory infection (Multi) 10/12/2015    Hypothyroidism 07/02/2014    Rheumatoid arthritis (Multi) 10/12/2018     Resolved Ambulatory Problems     Diagnosis Date Noted    Essential hypertension 04/03/2013  "    Past Medical History:   Diagnosis Date    Alcohol abuse with withdrawal, unspecified (Multi)     Personal history of other diseases of the circulatory system     Personal history of other diseases of the nervous system and sense organs 07/02/2020    Personal history of other diseases of urinary system     Post covid-19 condition, unspecified 12/16/2021    Strain of muscle and tendon of unspecified wall of thorax, initial encounter 09/27/2016    Unspecified otitis externa, unspecified ear 08/11/2016       Past Surgical History  Past Surgical History:   Procedure Laterality Date    OTHER SURGICAL HISTORY  07/17/2020    Inguinal hernia repair    OTHER SURGICAL HISTORY  07/17/2020    Ankle fracture repair       Family History  No family history on file.  No family history of stomach or colon cancer    Social History  TOBACCO:  reports that he has never smoked. He uses smokeless tobacco.  ETOH:  reports current alcohol use of about 56.0 standard drinks of alcohol per week.  DRUGS:  reports no history of drug use.  MARITAL STATUS:   OCCUPATION:    Review of Systems  Review of Systems   Reason unable to perform ROS: Unable to participate due to altered mental status.       PHYSICAL EXAM  VS: /86   Pulse 94   Temp 36.2 °C (97.2 °F) (Temporal)   Resp (!) 31   Ht 1.829 m (6' 0.01\")   Wt 95 kg (209 lb 7 oz)   SpO2 95%   BMI 28.40 kg/m²  Body mass index is 28.4 kg/m².  Physical Exam  Constitutional:       General: He is not in acute distress.  HENT:      Head: Normocephalic and atraumatic.      Nose:      Comments: OG in place with dark red output.  Approximately 150 mL     Mouth/Throat:      Mouth: Mucous membranes are moist.      Comments: Intubated  Eyes:      General: Scleral icterus present.      Extraocular Movements: Extraocular movements intact.      Conjunctiva/sclera: Conjunctivae normal.      Pupils: Pupils are equal, round, and reactive to light.   Cardiovascular:      Rate and Rhythm: Normal rate " and regular rhythm.      Heart sounds: No murmur heard.  Pulmonary:      Effort: Pulmonary effort is normal.      Breath sounds: No wheezing or rhonchi.   Abdominal:      General: Bowel sounds are normal. There is no distension.      Palpations: Abdomen is soft. There is no mass.      Tenderness: There is no abdominal tenderness.      Hernia: No hernia is present.   Musculoskeletal:         General: No swelling or deformity.   Skin:     General: Skin is warm.      Coloration: Skin is jaundiced.      Comments: Tattoos noticed   Neurological:      General: No focal deficit present.      Mental Status: He is alert. He is disoriented.   Psychiatric:      Comments: Agitated          DATA  Recent blood work and relevant radiology and endoscopic studies were reviewed   Results from last 7 days   Lab Units 04/13/24  0352   WBC AUTO x10*3/uL 15.9*   RBC AUTO x10*6/uL 2.47*   HEMOGLOBIN g/dL 8.4*   HEMATOCRIT % 24.7*   MCV fL 100   MCHC g/dL 34.0   RDW % 16.0*   PLATELETS AUTO x10*3/uL 73*       Results from last 72 hours   Lab Units 04/13/24  0352   SODIUM mmol/L 138   POTASSIUM mmol/L 4.0   CHLORIDE mmol/L 105   CO2 mmol/L 25   BUN mg/dL 10   CREATININE mg/dL 0.82   CALCIUM mg/dL 8.5*   PROTEIN TOTAL g/dL 5.4*   BILIRUBIN TOTAL mg/dL 6.4*   ALK PHOS U/L 139*   AST U/L 150*   ALT U/L 43       Results from last 72 hours   Lab Units 04/13/24  0352   INR  1.9*       Results from last 72 hours   Lab Units 04/10/24  1003   LIPASE U/L 42      Latest Reference Range & Units 01/24/23 10:20   AYAKA NEGATIVE  NEGATIVE   Anti-Mitochondrial Antibody NEGATIVE  NEGATIVE   Anti-Smooth Muscle Antibody NEGATIVE  NEGATIVE     Collected 5/4/2023 15:15    Visible to patient: Yes (not seen)    0 Result Notes      Component  Ref Range & Units    Hemochromatosis Interpretation  NORMAL NORMAL   Comment: .  Interpretive comments:         Latest Reference Range & Units 01/24/23 10:20   Hepatitis B Surface AB <10 mIU/mL 7.7   Hepatitis C AB NONREACTIVE   NONREACTIVE   Hepatitis B Surface AG NONREACTIVE  NONREACTIVE   Hepatitis B Core AB- Total NONREACTIVE  NONREACTIVE   Hepatitis B Core AB; IgM NONREACTIVE  NONREACTIVE     RADIOLOGY REVIEW  CT head without contrast 4/13/2024  IMPRESSION:  1. Stable right posterior cerebral convexity subdural hematoma with no midline shift and only minimal effacement of sulci. This minimally extends along the right side of the posterior interhemispheric fissure and right lateral aspect of the right cerebellar tentorium  2. Acute and chronic paranasal sinus inflammatory changes and debris noted in the external auditory canals bilaterally    US gallbladder 4/10/24  IMPRESSION:  Cirrhotic changes within the liver.  Cholelithiasis.  No gallbladder wall thickening or biliary dilatation is appreciated.   No significant change compared to prior imaging.    Liver elastography 2/15/2023  Impression  Elastography corresponding to F1, normal to mild, based on the Metavir score as detailed above.      ENDOSCOPIC REVIEW  EGD by Dr Joseph 11/15/2022  - LA Grade A reflux esophagitis with no bleeding. Rule out Nesbitt's esophagus. Biopsied.  - Normal upper third of esophagus, middle third of esophagus and lower third of esophagus.  - Gastritis. Biopsied.  - Portal hypertensive gastropathy.  - Normal duodenal bulb, first portion of the duodenum and second portion of the duodenum.    Colonoscopy at [facility], records obtained, colonoscopy 4/2019 showing 3 polyps in the rectosigmoid area, hemorrhoids, repeat in 5 years     IMPRESSION/RECOMMENDATIONS  patient is a 64 y.o. male with signficant past medical history of hypertension, gout, alcohol abuse, complete heart block status post pacemaker, NAE, and neuropathy who presents for alcohol intoxication.  He was found to be in a flutter in the ER and cardiology was consulted.  Original plan was to discharge and follow-up as outpatient but patient went through withdrawal. On 4/12 patient had a run of V.  tach and CODE BLUE was called.  CPR was initiated.  ROSC was obtained and patient was sent to ICU intubated.  Went for CT head which found a subdural hemorrhage in the right posterior cerebral hemisphere.  Neurosurgery was called who did not recommend surgery.  An NG tube was placed and he was noted to have some blood.  PPI and octreotide were started.    S/P V tach arrest  Concern for cirrhosis - MELD 21. Alcohol abuse. Noted on US 4/24, EGD 11/2022 with pHTN, fibroscan 2/23 F1. HFE, autoimmune negative, hep c negative.  Platelets 73 prior to admit were normal, INR 1.9, prior to admit normal.  AST ALT flip consistent with alcohol abuse.  Elevated total bilirubin and low albumin.  No hyponatremia. Cirrhosis is likely although labs falsely elevated in acute setting  Concern for UGIB - hgb (4/13) 8.4, baselines 10. Blood in NG. Low concern for variceal bleed. S/p 2u plasma and 2u plts  Subdural hematoma     PLAN  No urgent or emergent endoscopic evaluation  Continue octreotide for 72 hours  Continue Protonix 40 mg twice daily  Agree with plt goal above 75 and INR below 1.6.   Consider use of lactulose, either through OG or enema to cover HE   Continue n.p.o. status other than meds  Continue supportive care per primary team    Discussed with REYES Verdugo to follow    (Electronically signed byValencia Cdi PA-C on 4/13/2024 at 7:57 AM)    Inpatient consult to Gastroenterology  Consult performed by: Valencia Cid PA-C  Consult ordered by: Latoya Aleman MD

## 2024-04-13 NOTE — PROGRESS NOTES
"Critical Care Progress Note    Patient Name: Trey Borjas   YOB: 1960    Subjective:  Patient intubated and sedated, unable to participate in interview.    Objective:    /65   Pulse 85   Temp 37.6 °C (99.7 °F)   Resp 22   Ht 1.829 m (6' 0.01\")   Wt 95 kg (209 lb 7 oz)   SpO2 100%   BMI 28.40 kg/m²     Physical Exam:  GEN: intubate, sedated  HEENT: eyes open to voice, ET tube in place  NECK: supple, no cervical LAD, no carotid bruits  CV: not tachy  PULM: mechanical ventilation  ABD: soft, NT, ND, BS+  EXT: no LE edema  NEURO: no gross focal deficits    Assessment/Plan:  Trey Borjas is a 64yo man with past medical history of HTN, gout, neuropathy, alcohol abuse, smoking, Afib, complete heart block s/p pacemaker who was initially admitted on 4/10/23 for aflutter and monitoring for alcohol withdrawal. Code Blue was called on 4/12/24 with patient developing pVT, CPR was initiated, patient obtained ROSC within minutes of code without defibrillation. Patient was intubated during this time. Patient transferred to ICU for post-arrest care. CTH showed subdural hemorrhage in the posterior right cerebral hemisphere, on-call neurosurgeon Dr. Sahu reviewed images and recommended no role for surgery at this time. Patient also reported having blood via OG tube and PPI and octreotide gtt were initiated. Vitamin K and FFP given as INR> 1.5.    Neuro:  #SDH  #Possible Hepatic Encephalopathy  #Possible Wernicke Encephalopathy  #Metabolic Encephalopathy secondary to arrest  - Neurosurgery following, no surgical intervention at this time, plan for interval CTH tomorrow morning. Goal INR < 1.6, Keppra 500mg BID  - Maintain SBP < 140  - Discontinued versed gtt, will continue precedex for light sedation  - IV thiamine for possible wernicke, continue multivitamin, and folic acid  - Restart lactulose    Cardiovascular  #S/p Cardiac Arrest (pVtach)  #Aflutter  - Maintain K>4 and Mg > 2  - Cardiology " following, will follow-up Echo  - Wean off levo gtt as tolerated for MAP > 65    Pulmonary:  #Respiratory failure s/p cardiac arrest  - Patient intubated 4/12  - Precedex gtt for light sedation/vent synchrony    GI:  #Concern for UGIB  #Hepatic Cirrhosis  #Transaminitis - suspect secondary to alcoholic hepatitis   #Hyperbilirubinemia  - 40mg Protonix BID  - Octreotide gtt  - GI following, no urgent need for endoscopic evaluation. Continue octreotide for 72 hours  - EGD 11/2022 showed no varices or bleeding ulcers  - Continue with lactulose    Renal:   #Lactic acidosis  - Trend Lactate  - Monitor urine output  - K >4 Mg >2    Endocrine:  #T2DM  A1c 8.5  - Goal blood glucose 140-180    Heme/Onc:  #Concern for blood loss anemia  #Elevated INR  #Thrombocytopenia  - Transfuse to maintain goal Plt > 75 and INR < 1.6  - Maintain T&S  - CBC this evening    ID:  #Possible CAP  CT Chest 4/12 GGO in [facility] and [facility]  - Broad spectrum antibiotics with Vanc and Cefepime   - Follow-up cultures and PNA work-up; MRSA negative    ICU CHECK LIST:   Antimicrobials: Vanc, Cefepime (day 1)  Oxygen: Mechanically ventilated  Feeding: NPO in setting of UGIB  Fluids: D5LR 125  Analgesia: Tylenol  Sedation: Precedex  Thromboprophylaxis: Held in setting of SDH and GIB  Ulcer prophylaxis: PPI  Glycemic control: BGM  Bowel care: Lactulose  Indwelling catheters: Ro   Lines: 2x 20 Ga IV, 1x 18 Ga IV, A-line    Code Status: Full Code      Bill Patterson MD  Critical Care

## 2024-04-13 NOTE — H&P
[facility] Pulmonary and Critical Care Medicine   History and Physical          HPI:    63-year-old gentleman with past medical history of hypertension, gout, neuropathy, alcohol abuse, smoking, A-fib complete heart block status post pacemaker presented with concern for drinking and burning pain in his feet.  Patient also had metabolic acidosis.  Patient was admitted to the floor and started on CIWA protocol for alcohol withdrawal.  Overnight patient had worsening of his alcohol withdrawal and also was hypoxic.  Patient had CODE BLUE today and was found to be pulseless with V. tach.  ROSC achieved after 1 round of CPR with 1 epi.  Intubated after ROSC achieved as patient was obtunded. Transferred to ICU after ROSC.  As per chart review and signout, patient had pulseless V. tach.  Patient seen in the ICU.  Intubated.  Not on any sedation.  He is tachypneic.  Does not follow any commands.  Unable to answer any questions. he remains hemodynamically stable at this time.      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     Social History:   reports that he has never smoked. He uses smokeless tobacco. He reports current alcohol use of about 56.0 standard drinks of alcohol per week. He reports that he does not use drugs.      Scheduled Medications:   [Transfer Hold] allopurinol, 300 mg, oral, Daily  [Transfer Hold] chlordiazePOXIDE, 25 mg, oral, TID  [Transfer Hold] enoxaparin, 40 mg, subcutaneous, Daily  [Transfer Hold] folic acid, 1 mg, oral, Daily  [Transfer Hold] gabapentin, 100 mg, oral, BID  [Transfer Hold] gabapentin, 300 mg, oral, Nightly  [Transfer Hold] lactulose, 20 g, oral, TID  [Transfer Hold] metoprolol tartrate, 50 mg, oral, BID  [Transfer Hold] multivitamin with minerals, 1 tablet, oral, Daily  oxygen, , inhalation, Continuous - Inhalation  [Transfer Hold] pantoprazole, 40 mg, oral, Daily  potassium chloride, 20 mEq, intravenous, q2h  [Transfer Hold] tamsulosin, 0.4 mg, oral, Daily  [Transfer Hold] thiamine, 100 mg, oral, Daily         Continuous Medications:   dexmedeTOMIDine, 0.1-1.5 mcg/kg/hr  dextrose 5 % and lactated Ringer's, 125 mL/hr, Last Rate: 125 mL/hr (04/12/24 0446)         PRN Medications:   PRN medications: [Transfer Hold] guaiFENesin, [Transfer Hold] LORazepam **OR** [Transfer Hold] LORazepam **OR** [Transfer Hold] LORazepam, [Transfer Hold] melatonin, [Transfer Hold] polyethylene glycol    Review of systems:   Review of Systems   Unable to be completed as patient unable to answer any questions.      Objective   Vitals:  Most Recent:  Vitals:    04/12/24 1900   BP:    Pulse:    Resp:    Temp: 38.4 °C (101.1 °F)   SpO2:        24hr Min/Max:  Temp  Min: 36.2 °C (97.2 °F)  Max: 38.9 °C (102 °F)  Pulse  Min: 89  Max: 112  BP  Min: 102/59  Max: 123/59  Resp  Min: 16  Max: 22  SpO2  Min: 90 %  Max: 96 %    LDA:   ETT  7.5 mm (Active)   Placement Date: 04/12/24   ETT Type: ETT - single  Single Lumen Tube Size: 7.5 mm  Cuffed: Yes   Location: Oral  Airway Insertion Attempts: 1  Comments: Pt. arrived to CVICU intubated   Number of days: 0       Urethral Catheter Non-latex 16 Fr. (Active)   Placement Date/Time: 04/12/24 1900   Placed by: RN  Hand Hygiene Completed: Yes  Catheter Type: Non-latex  Tube Size (Fr.): 16 Fr.  Catheter Balloon Size: 10 mL  Urine Returned: Yes   Number of days: 0         Vent settings:  Vent Mode: Volume control/assist control  FiO2 (%):  [50 %] 50 %  S RR:  [14] 14  S VT:  [480 mL] 480 mL  PEEP/CPAP (cm H2O):  [5 cm H20] 5 cm H20  MAP (cm H2O):  [7.6] 7.6    Hemodynamic parameters for last 24 hours:         Intake/Output Summary (Last 24 hours) at 4/12/2024 2009  Last data filed at 4/12/2024 1230  Gross per 24 hour   Intake 820 ml   Output --   Net 820 ml         Physical exam:    Physical Exam:      General: Intubated.  Tachypneic on the ventilator.  HEENT: Normocephalic atraumatic, pupils reactive to light.  Chest: Bilateral equal breathing sounds with no wheezing  Heart: Regular rate and rhythm  Abdomen: Soft nontender nondistended  Extremities: No edema cyanosis  Neuro: Unable to follow any commands.  Does have gag reflex.  Spontaneously wiggling her toes.    Lab/Radiology/Diagnostic Review:  Results for orders placed or performed during the hospital encounter of 04/10/24 (from the past 24 hour(s))   Comprehensive Metabolic Panel   Result Value Ref Range    Glucose 130 (H) 74 - 99 mg/dL    Sodium 140 136 - 145 mmol/L    Potassium 3.1 (L) 3.5 - 5.3 mmol/L    Chloride 104 98 - 107 mmol/L    Bicarbonate 27 21 - 32 mmol/L    Anion Gap 12 10 - 20 mmol/L    Urea Nitrogen 13 6 - 23 mg/dL    Creatinine 0.77 0.50 - 1.30 mg/dL    eGFR >90 >60 mL/min/1.73m*2    Calcium 9.5 8.6 - 10.3 mg/dL    Albumin 3.0 (L) 3.4 - 5.0 g/dL    Alkaline Phosphatase 161 (H) 33 - 136 U/L    Total Protein 6.1 (L) 6.4 - 8.2 g/dL     (H) 9 - 39 U/L    Bilirubin, Total 6.9 (H) 0.0 - 1.2 mg/dL    ALT 41 10 - 52 U/L   CBC and Auto Differential    Result Value Ref Range    WBC 9.1 4.4 - 11.3 x10*3/uL    nRBC 0.0 0.0 - 0.0 /100 WBCs    RBC 2.88 (L) 4.50 - 5.90 x10*6/uL    Hemoglobin 9.7 (L) 13.5 - 17.5 g/dL    Hematocrit 28.3 (L) 41.0 - 52.0 %    MCV 98 80 - 100 fL    MCH 33.7 26.0 - 34.0 pg    MCHC 34.3 32.0 - 36.0 g/dL    RDW 15.8 (H) 11.5 - 14.5 %    Platelets 80 (L) 150 - 450 x10*3/uL    Neutrophils % 81.0 40.0 - 80.0 %    Immature Granulocytes %, Automated 1.0 (H) 0.0 - 0.9 %    Lymphocytes % 8.7 13.0 - 44.0 %    Monocytes % 7.7 2.0 - 10.0 %    Eosinophils % 1.2 0.0 - 6.0 %    Basophils % 0.4 0.0 - 2.0 %    Neutrophils Absolute 7.37 1.20 - 7.70 x10*3/uL    Immature Granulocytes Absolute, Automated 0.09 0.00 - 0.70 x10*3/uL    Lymphocytes Absolute 0.79 (L) 1.20 - 4.80 x10*3/uL    Monocytes Absolute 0.70 0.10 - 1.00 x10*3/uL    Eosinophils Absolute 0.11 0.00 - 0.70 x10*3/uL    Basophils Absolute 0.04 0.00 - 0.10 x10*3/uL   SST TOP   Result Value Ref Range    Extra Tube Hold for add-ons.    Comprehensive Metabolic Panel   Result Value Ref Range    Glucose 135 (H) 74 - 99 mg/dL    Sodium 141 136 - 145 mmol/L    Potassium 3.2 (L) 3.5 - 5.3 mmol/L    Chloride 104 98 - 107 mmol/L    Bicarbonate 26 21 - 32 mmol/L    Anion Gap 14 10 - 20 mmol/L    Urea Nitrogen 12 6 - 23 mg/dL    Creatinine 0.76 0.50 - 1.30 mg/dL    eGFR >90 >60 mL/min/1.73m*2    Calcium 9.7 8.6 - 10.3 mg/dL    Albumin 3.0 (L) 3.4 - 5.0 g/dL    Alkaline Phosphatase 165 (H) 33 - 136 U/L    Total Protein 6.3 (L) 6.4 - 8.2 g/dL     (H) 9 - 39 U/L    Bilirubin, Total 7.3 (H) 0.0 - 1.2 mg/dL    ALT 44 10 - 52 U/L   CBC and Auto Differential   Result Value Ref Range    WBC 9.7 4.4 - 11.3 x10*3/uL    nRBC 0.3 (H) 0.0 - 0.0 /100 WBCs    RBC 3.05 (L) 4.50 - 5.90 x10*6/uL    Hemoglobin 10.3 (L) 13.5 - 17.5 g/dL    Hematocrit 29.6 (L) 41.0 - 52.0 %    MCV 97 80 - 100 fL    MCH 33.8 26.0 - 34.0 pg    MCHC 34.8 32.0 - 36.0 g/dL    RDW 15.7 (H) 11.5 - 14.5 %    Platelets 81 (L) 150 - 450 x10*3/uL     Neutrophils % 79.5 40.0 - 80.0 %    Immature Granulocytes %, Automated 1.0 (H) 0.0 - 0.9 %    Lymphocytes % 7.7 13.0 - 44.0 %    Monocytes % 9.9 2.0 - 10.0 %    Eosinophils % 1.5 0.0 - 6.0 %    Basophils % 0.4 0.0 - 2.0 %    Neutrophils Absolute 7.73 (H) 1.20 - 7.70 x10*3/uL    Immature Granulocytes Absolute, Automated 0.10 0.00 - 0.70 x10*3/uL    Lymphocytes Absolute 0.75 (L) 1.20 - 4.80 x10*3/uL    Monocytes Absolute 0.96 0.10 - 1.00 x10*3/uL    Eosinophils Absolute 0.15 0.00 - 0.70 x10*3/uL    Basophils Absolute 0.04 0.00 - 0.10 x10*3/uL   Ammonia   Result Value Ref Range    Ammonia 72 (H) 16 - 53 umol/L   Magnesium   Result Value Ref Range    Magnesium 1.52 (L) 1.60 - 2.40 mg/dL   Troponin I, High Sensitivity   Result Value Ref Range    Troponin I, High Sensitivity 36 (H) 0 - 20 ng/L   Comprehensive Metabolic Panel   Result Value Ref Range    Glucose 118 (H) 74 - 99 mg/dL    Sodium 142 136 - 145 mmol/L    Potassium 3.2 (L) 3.5 - 5.3 mmol/L    Chloride 106 98 - 107 mmol/L    Bicarbonate 25 21 - 32 mmol/L    Anion Gap 14 10 - 20 mmol/L    Urea Nitrogen 9 6 - 23 mg/dL    Creatinine 0.76 0.50 - 1.30 mg/dL    eGFR >90 >60 mL/min/1.73m*2    Calcium 9.3 8.6 - 10.3 mg/dL    Albumin 2.9 (L) 3.4 - 5.0 g/dL    Alkaline Phosphatase 163 (H) 33 - 136 U/L    Total Protein 6.2 (L) 6.4 - 8.2 g/dL     (H) 9 - 39 U/L    Bilirubin, Total 7.3 (H) 0.0 - 1.2 mg/dL    ALT 46 10 - 52 U/L   POCT GLUCOSE   Result Value Ref Range    POCT Glucose 116 (H) 74 - 99 mg/dL   Protime-INR   Result Value Ref Range    Protime 20.3 (H) 9.8 - 12.8 seconds    INR 1.8 (H) 0.9 - 1.1   CBC and Auto Differential   Result Value Ref Range    WBC 15.4 (H) 4.4 - 11.3 x10*3/uL    nRBC 0.5 (H) 0.0 - 0.0 /100 WBCs    RBC 3.12 (L) 4.50 - 5.90 x10*6/uL    Hemoglobin 10.6 (L) 13.5 - 17.5 g/dL    Hematocrit 31.0 (L) 41.0 - 52.0 %    MCV 99 80 - 100 fL    MCH 34.0 26.0 - 34.0 pg    MCHC 34.2 32.0 - 36.0 g/dL    RDW 16.2 (H) 11.5 - 14.5 %    Platelets 106 (L)  150 - 450 x10*3/uL    Neutrophils % 85.0 40.0 - 80.0 %    Immature Granulocytes %, Automated 1.2 (H) 0.0 - 0.9 %    Lymphocytes % 6.9 13.0 - 44.0 %    Monocytes % 5.3 2.0 - 10.0 %    Eosinophils % 1.2 0.0 - 6.0 %    Basophils % 0.4 0.0 - 2.0 %    Neutrophils Absolute 13.10 (H) 1.20 - 7.70 x10*3/uL    Immature Granulocytes Absolute, Automated 0.18 0.00 - 0.70 x10*3/uL    Lymphocytes Absolute 1.06 (L) 1.20 - 4.80 x10*3/uL    Monocytes Absolute 0.82 0.10 - 1.00 x10*3/uL    Eosinophils Absolute 0.18 0.00 - 0.70 x10*3/uL    Basophils Absolute 0.06 0.00 - 0.10 x10*3/uL   Magnesium   Result Value Ref Range    Magnesium 1.73 1.60 - 2.40 mg/dL   Green Top   Result Value Ref Range    Extra Tube Hold for add-ons.    Comprehensive metabolic panel   Result Value Ref Range    Glucose 144 (H) 74 - 99 mg/dL    Sodium 141 136 - 145 mmol/L    Potassium 3.1 (L) 3.5 - 5.3 mmol/L    Chloride 104 98 - 107 mmol/L    Bicarbonate 23 21 - 32 mmol/L    Anion Gap 17 10 - 20 mmol/L    Urea Nitrogen 9 6 - 23 mg/dL    Creatinine 0.90 0.50 - 1.30 mg/dL    eGFR >90 >60 mL/min/1.73m*2    Calcium 9.3 8.6 - 10.3 mg/dL    Albumin 3.0 (L) 3.4 - 5.0 g/dL    Alkaline Phosphatase 165 (H) 33 - 136 U/L    Total Protein 6.5 6.4 - 8.2 g/dL     (H) 9 - 39 U/L    Bilirubin, Total 7.5 (H) 0.0 - 1.2 mg/dL    ALT 49 10 - 52 U/L   Troponin I, High Sensitivity   Result Value Ref Range    Troponin I, High Sensitivity 47 (H) 0 - 20 ng/L   POCT GLUCOSE   Result Value Ref Range    POCT Glucose 150 (H) 74 - 99 mg/dL   Blood Gas Arterial Full Panel   Result Value Ref Range    POCT pH, Arterial 7.44 (H) 7.38 - 7.42 pH    POCT pCO2, Arterial 41 38 - 42 mm Hg    POCT pO2, Arterial 54 (L) 85 - 95 mm Hg    POCT SO2, Arterial 84 (L) 94 - 100 %    POCT Oxy Hemoglobin, Arterial 81.5 (L) 94.0 - 98.0 %    POCT Hematocrit Calculated, Arterial 49.0 41.0 - 52.0 %    POCT Sodium, Arterial 138 136 - 145 mmol/L    POCT Potassium, Arterial 3.4 (L) 3.5 - 5.3 mmol/L    POCT Chloride,  Arterial 101 98 - 107 mmol/L    POCT Ionized Calcium, Arterial 1.24 1.10 - 1.33 mmol/L    POCT Glucose, Arterial 209 (H) 74 - 99 mg/dL    POCT Lactate, Arterial 3.8 (H) 0.4 - 2.0 mmol/L    POCT Base Excess, Arterial 3.3 (H) -2.0 - 3.0 mmol/L    POCT HCO3 Calculated, Arterial 27.8 (H) 22.0 - 26.0 mmol/L    POCT Hemoglobin, Arterial 16.4 13.5 - 17.5 g/dL    POCT Anion Gap, Arterial 13 10 - 25 mmo/L    Patient Temperature 37.0 degrees Celsius    FiO2 50 %    Ventilator Mode A/C     Ventilator Rate 16 bpm    Tidal Volume 480 mL    Spontaneous Tidal Volume 502 mL    Total Minute Volume 15.2 Liter    Frequency (BPM) 29 bpm    Peak Pressure 16.0 cm H2O    Inspiratory/Expiratory Ratio      Flow 50.0 LPM    Site of Arterial Puncture Radial Right     Scooter's Test Positive        All other labs and Imaging have been personally reviewed.         Assessment/Plan       S/p cardiac arrest (Rhythm was V-tach)  Encephalopathy  Respiratory failure post postcardiac arrest  Alcoholism with alcohol withdrawal  Thrombocytopenia  Hypoalbuminemia  Transaminitis  Hyperbilirubinemia  Coagulopathy with elevated INR  Leukocytosis  Anemia  Atrial flutter  Lactic acidosis  Cirrhosis  Hypokalemia  Hypomagnesemia      Target Temperature management with goal temperature of 36 °C as patient is not able to follow commands.  Patient having Vent dyssynchrony.  Will try Precedex for light sedation  Consider to do CT head   Will check troponin, EKG, echocardiogram.  Replete electrolytes for goal potassium of 4 and magnesium 2  Patient heart rate and 80s and 90s.  Will start amiodarone if needed for tachycardia  ABG s/p intubation reviewed.  Increased FiO2 from 50 to 70%.  Continue rest of the vent settings which is VC AC RR 16/tidal volume 400/FiO2 70/PEEP of 5.  Chest x-ray reviewed with endotracheal tube 7.6 cm above angelika.  ET tube advanced 3 cm.  Chest x-ray reviewed after advancing the tube.  Can start trickle feeds  Monitor urine output  Every 6  hours labs for 24 hours.  Troponin, lactic acid, CBC, CMP, ABG.     Infectious work up and broad spectrum abx  Monitor UO.      ICU CHECK LIST:   Antimicrobials: None  Oxygen:  On the  ventilator  Feeding: Tube feeds   Fluids: None  Analgesia: None  Sedation: Precedex  Thromboprophylaxis: SCDs. Can start Lovenox if CTH negative.   Ulcer prophylaxis: PPI   Glycemic control: BGM  Bowel care: Senna   Indwelling catheters: Ro   Lines: PIVs   Dispo: MICU   Code Status: Full code     I have personally spent 80 minutes of critical care time, exclusive of time spent on any procedures, in evaluation and management of this critically ill patient’s condition mentioned above in the assessment and plan.     Latoya Aleman MD  Critical Care Attending     Please excuse any typographical or unwanted errors within this documentation as voice recognition software was used to dictate this note.

## 2024-04-13 NOTE — SIGNIFICANT EVENT
Patient CT head with subdural hemorrhage in the posterior right cerebral hemisphere.  Small hemorrhage in the right lateral aspect of the posterior falx.  Spoke with the neurosurgeon Dr. Sahu who reviewed the images and recommended no role for surgery at this time.  Goal systolic blood pressure less than 140.  Will hold any blood thinner.  Repeat CT head 6:00 in the morning 6 hours after the first CTH.  Dr. Sahu to see the patient in the morning.     Patient also having blood via the NG tube. As patient has hx of cirrhosis, started on PPI and octreotide. CBC q6h. Transfuse to keep hg goal of 7. Vitamin K and FFP given as INR>1.5.     The patient's next of Kin is brother Felipe who was updated on the phone [phone]. Felipe also planning to come to the hospital early morning.

## 2024-04-13 NOTE — PROGRESS NOTES
"Trey Borjas is a 64 y.o. male on day 1 of admission presenting with Atrial flutter, unspecified type (Multi).        Subjective   Seen this morning, on sedation and mechanical ventilation.  Management per ICU.  Patient had CODE BLUE yesterday evening and transferred onto the ICU.  Patient's brother is at bedside.  I did update them on the events leading to his transfer to ICU and findings on workup thus far.  Also outlined the current treatment plan.  They are agreeable at this time and hopeful that he will respond to treatments and see some improvements.  It is too early to give an accurate prognosis.  Case discussed with the intensivist at length and will assist in coordination of care in any way possible through today.       Objective     Last Recorded Vitals  /61   Pulse 87   Temp 36 °C (96.8 °F) (Tympanic)   Resp 21   Ht 1.829 m (6' 0.01\")   Wt 95 kg (209 lb 7 oz)   SpO2 96%   BMI 28.40 kg/m²     Intake/Output last 3 Shifts:  I/O last 3 completed shifts:  In: 1133.6 (11.9 mL/kg) [P.O.:620; I.V.:213.6 (2.2 mL/kg); Blood:300]  Out: 125 (1.3 mL/kg) [Urine:125 (0 mL/kg/hr)]  Weight: 95 kg       =========RELEVANT RESULTS ==========  Labs  Lab Results   Component Value Date    WBC 15.9 (H) 04/13/2024    HGB 8.4 (L) 04/13/2024    HCT 24.7 (L) 04/13/2024     04/13/2024    PLT 73 (L) 04/13/2024     Lab Results   Component Value Date    GLUCOSE 196 (H) 04/13/2024    CALCIUM 8.5 (L) 04/13/2024     04/13/2024    K 4.0 04/13/2024    CO2 25 04/13/2024     04/13/2024    BUN 10 04/13/2024    CREATININE 0.82 04/13/2024      Lab Results   Component Value Date    ALT 43 04/13/2024     (H) 04/13/2024    ALKPHOS 139 (H) 04/13/2024    BILITOT 6.4 (H) 04/13/2024        Recent Echocardiogram (14D):   No echocardiogram results found for the past 14 days    TTE 12 month if available:  No echocardiogram results found for the past 12 months    Recent Imaging Results:  CT head wo IV " contrast  Narrative: Interpreted By:  Flores Jeter,   STUDY:  CT HEAD WO IV CONTRAST;  4/13/2024 7:10 am      INDICATION:  Signs/Symptoms:To see progression of subdural hemorrhage as per NSGY  recommendations.      COMPARISON:  04/12/2024 CT brain.      ACCESSION NUMBER(S):  [id number]      ORDERING CLINICIAN:  JAYA TRAVIS      TECHNIQUE:  CT axial images through the Brain were obtained without contrast.      All CT exams are performed with 1 or more of the following dose  reduction techniques: Automatic exposure control, adjustment of mA  and/or kv according to patient size, or use of iterative  reconstruction techniques.      FINDINGS:  Again noted is a small hyperdense subdural hematoma overlying the  right posterior cerebral convexity with a maximum thickness of 5.8  mm. This slightly tracts along the right side of the interhemispheric  fissure posteriorly and minimally extends along the right lateral  cerebellar tentorium. No midline shift. Minimal effacement of right  posterior convexity sulci. No new hemorrhage.      Ventricles appear normal. No sign of infarct.      Air-fluid levels are noted in the maxillary sinuses bilaterally.  Paranasal sinus mucosal thickening is seen. Debris is noted filling  the external auditory canals bilaterally.. The visualized portions of  the orbits are unremarkable.      Impression: 1. Stable right posterior cerebral convexity subdural hematoma with  no midline shift and only minimal effacement of sulci. This minimally  extends along the right side of the posterior interhemispheric  fissure and right lateral aspect of the right cerebellar tentorium  2. Acute and chronic paranasal sinus inflammatory changes and debris  noted in the external auditory canals bilaterally      MACRO:  None.      Signed by: Flores Jeter 4/13/2024 7:29 AM  Dictation workstation:   [id number]  CT head wo IV contrast  Narrative: Interpreted By:  Israel Block,   STUDY:  CT HEAD WO IV  CONTRAST;  4/12/2024 11:57 pm      INDICATION:  Signs/Symptoms:s/p cardiac arrest.      COMPARISON:  None.      ACCESSION NUMBER(S):  [id number]      ORDERING CLINICIAN:  JAYA TRAVIS      TECHNIQUE:  Noncontrast axial CT scan of head was performed. Angled reformats in  brain and bone windows were generated. The images were reviewed in  bone, brain, blood and soft tissue windows.      FINDINGS:  A hyperdense layering subdural hemorrhage, measuring up to 5 mm in  width (series 205, image 69) overlies the posterior right cerebral  hemisphere, with slight mass effect on the adjacent brain parenchyma  and no significant midline shift. There may be slight effacement of  the left parietal and occipital sulci. Trace amount of hemorrhage  also layers along the right lateral margin of the posterior falx.      No additional areas of hyperdense intracranial hemorrhage are  identified.      Subtle attenuation changes are present in the periventricular and  subcortical white matter of bilateral cerebral hemispheres. No large  CT apparent transcortical infarct is identified at this time.      No ventricular dilatation is present. Basal cisterns are patent.      Scalp soft tissues do not demonstrate any acute abnormality.  Calvarium is unremarkable in appearance.      Mucosal thickening is present in scattered ethmoidal air cells.      Impression: 1.  A hyperdense subdural hemorrhage measuring up to 5 mm in width  overlies the posterior right cerebral hemisphere with slight mass  effect on the adjacent brain parenchyma and some effacement of the  adjacent right occipital sulci, although there is no significant  midline shift. Trace amount of hemorrhage also tracks along the right  lateral aspect of the posterior falx.  2. No evidence of intraparenchymal or intraventricular hemorrhage.  3. Subtle attenuation changes are present in the periventricular and  subcortical white matter of bilateral cerebral hemispheres, favored  to  represent sequela of microvascular disease. No evidence of large  CT apparent transcortical infarct at this time, although the  gray-white differentiation is somewhat difficult to differentiate.  MRI can be considered for additional characterization if there is  suspicion of anoxic injury.      MACRO:  None      Signed by: Israel Block 4/13/2024 1:28 AM  Dictation workstation:   [id number]      Exam     GENERAL: On sedation and mechanical ventilation.   HEENT: ET tube and OG tube in place.  CARDIOVASCULAR: RRR, no murmurs  RESPIRATORY: Patent airways, on mechanical ventilation   ABDOMEN: Soft, Normal Bowel Sounds, at baseline distention  SKIN: Warm and dry, no lesions, no rashes  EXTREMITIES: normal extremities, no significant cyanosis edema, contusions or wounds, no obsvious clubbing  NEURO: A&O x 0, off sedation        ======= SCHEDULED MEDICATIONS =======  Scheduled medications   Medication Dose Route Frequency    acetaminophen  650 mg nasogastric tube q6h MICKEY    [Transfer Hold] allopurinol  300 mg oral Daily    busPIRone  30 mg nasogastric tube q8h MICKEY    cefepime  500 mg intravenous q8h    [Transfer Hold] folic acid  1 mg oral Daily    [Transfer Hold] gabapentin  100 mg oral BID    [Transfer Hold] gabapentin  300 mg oral Nightly    [Transfer Hold] lactulose  20 g oral TID    [Transfer Hold] metoprolol tartrate  50 mg oral BID    [Transfer Hold] multivitamin with minerals  1 tablet oral Daily    oxygen   inhalation Continuous - Inhalation    pantoprazole  40 mg intravenous q12h    Followed by    [START ON 4/16/2024] pantoprazole  40 mg oral q12h    sennosides-docusate sodium  1 tablet oral Nightly    [Transfer Hold] tamsulosin  0.4 mg oral Daily    [Transfer Hold] thiamine  100 mg oral Daily    vancomycin  1,250 mg intravenous q12h       ========== PRN MEDICATIONS =========  acetaminophen, 650 mg, q6h PRN  [Transfer Hold] guaiFENesin, 600 mg, q12h PRN  [Transfer Hold] melatonin, 3 mg, Nightly  PRN  meperidine, 12.5 mg, q4h PRN  [Transfer Hold] polyethylene glycol, 17 g, Daily PRN  vancomycin, , Daily PRN        ==============  DIET  ==============  Dietary Orders (From admission, onward)       Start     Ordered    04/12/24 2136  NPO Diet; Effective now  Diet effective now         04/12/24 2137                    ====== Assessment/Plan   =======    ASSESSMENT:  Active Problems:    Atrial flutter, unspecified type (Multi)    ___________________________________________________    Acute cardiac arrest  Acute subdural hemorrhage  Acute Metabolic encephalopathy  Acute hypoxic respiratory failure  Atrial Flutter  Mild metabolic acidosis  Alcohol dependence  Etoh cirrhosis h/o GIB  Hypokalemia  Peripheral Neuropathy b/l feet      PLAN:  Worsened ETOH withdrawal and development of cardiac arrest yesterday evening.    Now in ICU on sedation and mechanical ventilation.   Antibiotics initiated  Drop in H&H - no significant bleeding during stay but blood noted in NG.  On Octreotide and PPI.       DVT Prophylaxis  Follow-up for concern for GI bleed    SUMMARY/DISPOSITION  Prognosis guarded at this point.  Management per intensivist but will follow along.  Discussed with intensivist today.  Updated family at bedside.               Sergei Gillis DO

## 2024-04-13 NOTE — CARE PLAN
Problem: Pain  Goal: My pain/discomfort is manageable  Outcome: Progressing     Problem: Safety  Goal: Patient will be injury free during hospitalization  Outcome: Progressing  Goal: I will remain free of falls  Outcome: Progressing     Problem: Daily Care  Goal: Daily care needs are met  Outcome: Progressing     Problem: Psychosocial Needs  Goal: Demonstrates ability to cope with hospitalization/illness  Outcome: Progressing  Goal: Collaborate with me, my family, and caregiver to identify my specific goals  Outcome: Progressing     Problem: Discharge Barriers  Goal: My discharge needs are met  Outcome: Progressing     Problem: ACS/CP/NSTEMI/STEMI  Goal: Chest pain managed (free from pain or at acceptable level)  Outcome: Progressing  Goal: Serial ECG will return to baseline  Outcome: Progressing     Problem: Arrythmia/Dysrhythmia  Goal: Lab values return to normal range  Outcome: Progressing  Goal: No evidence of post procedure complications  Outcome: Progressing  Goal: Promote self management  Outcome: Progressing  Goal: Serial ECG will return to baseline  Outcome: Progressing  Goal: Verbalize understanding of procedures/devices  Outcome: Progressing  Goal: Vital signs return to baseline  Outcome: Progressing  Goal: Care and maintenance of device (specify)  Outcome: Progressing     Problem: Cardiac catheterization  Goal: Free from dysrhythmias  Outcome: Progressing  Goal: Free from pain  Outcome: Progressing  Goal: No evidence of post procedure complications  Outcome: Progressing  Goal: Verbalize understanding of procedure  Outcome: Progressing  Goal: Care and maintenance of device (specify)  Outcome: Progressing     Problem: Hypertensive Emergency/Crisis  Goal: Blood pressure gradually reduced to goal range  Outcome: Progressing  Goal: Lab values return to normal range  Outcome: Progressing  Goal: Promote self management  Outcome: Progressing     Problem: Respiratory  Goal: Clear secretions with interventions  this shift  Outcome: Progressing  Goal: Minimize anxiety/maximize coping throughout shift  Outcome: Progressing  Goal: Minimal/no exertional discomfort or dyspnea this shift  Outcome: Progressing  Goal: No signs of respiratory distress (eg. Use of accessory muscles. Peds grunting)  Outcome: Progressing  Goal: Patent airway maintained this shift  Outcome: Progressing  Goal: Tolerate mechanical ventilation evidenced by VS/agitation level this shift  Outcome: Progressing  Goal: Tolerate pulmonary toileting this shift  Outcome: Progressing  Goal: Verbalize decreased shortness of breath this shift  Outcome: Progressing  Goal: Wean oxygen to maintain O2 saturation per order/standard this shift  Outcome: Progressing  Goal: Increase self care and/or family involvement in next 24 hours  Outcome: Progressing       Problem: ACS/CP/NSTEMI/STEMI  Goal: Lab values return to normal range  Outcome: Not Progressing  Goal: Promote self management  Outcome: Not Progressing  Goal: Verbalize understanding of procedures/devices  Outcome: Not Progressing  Goal: Wean vasopressors/achieve hemodynamic stability  Outcome: Not Progressing     Problem: Cardiac catheterization  Goal: Promote self management  Outcome: Not Progressing     Problem: Hypertensive Emergency/Crisis  Goal: Free from signs of organ damage  Outcome: Not Progressing

## 2024-04-13 NOTE — PROCEDURES
A time out was performed.  The wray wrist was prepped using chlorhexidine scrub and draped in sterile fashion using a three quarter sheet drape and sterile towels. The pulse was identified and the wrist was positioned in the usual fashion. Using the Arrow Radial Arterial Line Kit, a needle was inserted into the radial artery. Arterial blood was seen to pulsate in the flash chamber. The guidewire was advanced easily into the artery. The catheter was then advanced over the wire and the needle and wire were withdrawn. The catheter was sutured in place. A sterile opsite was placed over the catheter at the insertion site. The patient tolerated the procedure without any hemodynamic compromise. At the time of procedure completion, the catheter was connected to the cardiac monitor and calibrated. Appropriate waveform and blood pressure tracing was observed.   Estimated blood loss is 2 cc.

## 2024-04-14 ENCOUNTER — APPOINTMENT (OUTPATIENT)
Dept: RADIOLOGY | Facility: HOSPITAL | Age: 64
DRG: 308 | End: 2024-04-14
Payer: COMMERCIAL

## 2024-04-14 LAB
ALBUMIN SERPL BCP-MCNC: 2.6 G/DL (ref 3.4–5)
ALP SERPL-CCNC: 118 U/L (ref 33–136)
ALT SERPL W P-5'-P-CCNC: 36 U/L (ref 10–52)
ANION GAP SERPL CALC-SCNC: 7 MMOL/L (ref 10–20)
AST SERPL W P-5'-P-CCNC: 109 U/L (ref 9–39)
ATRIAL RATE: 118 BPM
BASOPHILS # BLD AUTO: 0.04 X10*3/UL (ref 0–0.1)
BASOPHILS NFR BLD AUTO: 0.5 %
BILIRUB SERPL-MCNC: 5.1 MG/DL (ref 0–1.2)
BUN SERPL-MCNC: 10 MG/DL (ref 6–23)
CALCIUM SERPL-MCNC: 8.4 MG/DL (ref 8.6–10.3)
CHLORIDE SERPL-SCNC: 107 MMOL/L (ref 98–107)
CO2 SERPL-SCNC: 28 MMOL/L (ref 21–32)
CREAT SERPL-MCNC: 0.72 MG/DL (ref 0.5–1.3)
EGFRCR SERPLBLD CKD-EPI 2021: >90 ML/MIN/1.73M*2
EOSINOPHIL # BLD AUTO: 0.38 X10*3/UL (ref 0–0.7)
EOSINOPHIL NFR BLD AUTO: 4.4 %
ERYTHROCYTE [DISTWIDTH] IN BLOOD BY AUTOMATED COUNT: 16.4 % (ref 11.5–14.5)
ERYTHROCYTE [DISTWIDTH] IN BLOOD BY AUTOMATED COUNT: 16.6 % (ref 11.5–14.5)
ERYTHROCYTE [DISTWIDTH] IN BLOOD BY AUTOMATED COUNT: 16.7 % (ref 11.5–14.5)
GLUCOSE BLD MANUAL STRIP-MCNC: 141 MG/DL (ref 74–99)
GLUCOSE SERPL-MCNC: 174 MG/DL (ref 74–99)
HCT VFR BLD AUTO: 21.6 % (ref 41–52)
HCT VFR BLD AUTO: 22.2 % (ref 41–52)
HCT VFR BLD AUTO: 23.7 % (ref 41–52)
HGB BLD-MCNC: 6.9 G/DL (ref 13.5–17.5)
HGB BLD-MCNC: 7.2 G/DL (ref 13.5–17.5)
HGB BLD-MCNC: 7.9 G/DL (ref 13.5–17.5)
IMM GRANULOCYTES # BLD AUTO: 0.12 X10*3/UL (ref 0–0.7)
IMM GRANULOCYTES NFR BLD AUTO: 1.4 % (ref 0–0.9)
INR PPP: 1.5 (ref 0.9–1.1)
LYMPHOCYTES # BLD AUTO: 0.71 X10*3/UL (ref 1.2–4.8)
LYMPHOCYTES NFR BLD AUTO: 8.3 %
MAGNESIUM SERPL-MCNC: 2.36 MG/DL (ref 1.6–2.4)
MCH RBC QN AUTO: 33.2 PG (ref 26–34)
MCH RBC QN AUTO: 33.3 PG (ref 26–34)
MCH RBC QN AUTO: 34.8 PG (ref 26–34)
MCHC RBC AUTO-ENTMCNC: 31.9 G/DL (ref 32–36)
MCHC RBC AUTO-ENTMCNC: 32.4 G/DL (ref 32–36)
MCHC RBC AUTO-ENTMCNC: 33.3 G/DL (ref 32–36)
MCV RBC AUTO: 103 FL (ref 80–100)
MCV RBC AUTO: 104 FL (ref 80–100)
MCV RBC AUTO: 104 FL (ref 80–100)
MONOCYTES # BLD AUTO: 0.61 X10*3/UL (ref 0.1–1)
MONOCYTES NFR BLD AUTO: 7.1 %
NEUTROPHILS # BLD AUTO: 6.68 X10*3/UL (ref 1.2–7.7)
NEUTROPHILS NFR BLD AUTO: 78.3 %
NRBC BLD-RTO: 0.4 /100 WBCS (ref 0–0)
NRBC BLD-RTO: 0.5 /100 WBCS (ref 0–0)
NRBC BLD-RTO: 0.5 /100 WBCS (ref 0–0)
P AXIS: 0 DEGREES
PLATELET # BLD AUTO: 73 X10*3/UL (ref 150–450)
PLATELET # BLD AUTO: 75 X10*3/UL (ref 150–450)
PLATELET # BLD AUTO: 80 X10*3/UL (ref 150–450)
POTASSIUM SERPL-SCNC: 3.4 MMOL/L (ref 3.5–5.3)
PR INTERVAL: 148 MS
PROT SERPL-MCNC: 5.2 G/DL (ref 6.4–8.2)
PROTHROMBIN TIME: 17.3 SECONDS (ref 9.8–12.8)
Q ONSET: 249 MS
QRS COUNT: 17 BEATS
QRS DURATION: 138 MS
QT INTERVAL: 417 MS
QTC CALCULATION(BAZETT): 565 MS
QTC FREDERICIA: 510 MS
R AXIS: 0 DEGREES
RBC # BLD AUTO: 2.08 X10*6/UL (ref 4.5–5.9)
RBC # BLD AUTO: 2.16 X10*6/UL (ref 4.5–5.9)
RBC # BLD AUTO: 2.27 X10*6/UL (ref 4.5–5.9)
SODIUM SERPL-SCNC: 139 MMOL/L (ref 136–145)
T AXIS: 157 DEGREES
T OFFSET: 458 MS
VANCOMYCIN SERPL-MCNC: 27.7 UG/ML (ref 5–20)
VENTRICULAR RATE: 110 BPM
WBC # BLD AUTO: 8 X10*3/UL (ref 4.4–11.3)
WBC # BLD AUTO: 8.3 X10*3/UL (ref 4.4–11.3)
WBC # BLD AUTO: 8.5 X10*3/UL (ref 4.4–11.3)

## 2024-04-14 PROCEDURE — P9073 PLATELETS PHERESIS PATH REDU: HCPCS

## 2024-04-14 PROCEDURE — 2580000001 HC RX 258 IV SOLUTIONS

## 2024-04-14 PROCEDURE — 84075 ASSAY ALKALINE PHOSPHATASE: CPT

## 2024-04-14 PROCEDURE — 2500000002 HC RX 250 W HCPCS SELF ADMINISTERED DRUGS (ALT 637 FOR MEDICARE OP, ALT 636 FOR OP/ED): Performed by: STUDENT IN AN ORGANIZED HEALTH CARE EDUCATION/TRAINING PROGRAM

## 2024-04-14 PROCEDURE — 2020000001 HC ICU ROOM DAILY

## 2024-04-14 PROCEDURE — 94003 VENT MGMT INPAT SUBQ DAY: CPT

## 2024-04-14 PROCEDURE — 2500000001 HC RX 250 WO HCPCS SELF ADMINISTERED DRUGS (ALT 637 FOR MEDICARE OP): Performed by: STUDENT IN AN ORGANIZED HEALTH CARE EDUCATION/TRAINING PROGRAM

## 2024-04-14 PROCEDURE — 36415 COLL VENOUS BLD VENIPUNCTURE: CPT

## 2024-04-14 PROCEDURE — 70450 CT HEAD/BRAIN W/O DYE: CPT

## 2024-04-14 PROCEDURE — 2500000004 HC RX 250 GENERAL PHARMACY W/ HCPCS (ALT 636 FOR OP/ED)

## 2024-04-14 PROCEDURE — 82947 ASSAY GLUCOSE BLOOD QUANT: CPT

## 2024-04-14 PROCEDURE — 85027 COMPLETE CBC AUTOMATED: CPT

## 2024-04-14 PROCEDURE — 2500000004 HC RX 250 GENERAL PHARMACY W/ HCPCS (ALT 636 FOR OP/ED): Performed by: INTERNAL MEDICINE

## 2024-04-14 PROCEDURE — 85025 COMPLETE CBC W/AUTO DIFF WBC: CPT

## 2024-04-14 PROCEDURE — 36430 TRANSFUSION BLD/BLD COMPNT: CPT

## 2024-04-14 PROCEDURE — 99232 SBSQ HOSP IP/OBS MODERATE 35: CPT | Performed by: PHYSICIAN ASSISTANT

## 2024-04-14 PROCEDURE — 80202 ASSAY OF VANCOMYCIN: CPT | Performed by: INTERNAL MEDICINE

## 2024-04-14 PROCEDURE — C9113 INJ PANTOPRAZOLE SODIUM, VIA: HCPCS

## 2024-04-14 PROCEDURE — 83735 ASSAY OF MAGNESIUM: CPT

## 2024-04-14 PROCEDURE — 99291 CRITICAL CARE FIRST HOUR: CPT

## 2024-04-14 PROCEDURE — 2500000005 HC RX 250 GENERAL PHARMACY W/O HCPCS: Performed by: STUDENT IN AN ORGANIZED HEALTH CARE EDUCATION/TRAINING PROGRAM

## 2024-04-14 PROCEDURE — 99291 CRITICAL CARE FIRST HOUR: CPT | Performed by: INTERNAL MEDICINE

## 2024-04-14 PROCEDURE — 70450 CT HEAD/BRAIN W/O DYE: CPT | Performed by: RADIOLOGY

## 2024-04-14 PROCEDURE — 85610 PROTHROMBIN TIME: CPT

## 2024-04-14 PROCEDURE — 2500000001 HC RX 250 WO HCPCS SELF ADMINISTERED DRUGS (ALT 637 FOR MEDICARE OP)

## 2024-04-14 PROCEDURE — 2500000004 HC RX 250 GENERAL PHARMACY W/ HCPCS (ALT 636 FOR OP/ED): Performed by: STUDENT IN AN ORGANIZED HEALTH CARE EDUCATION/TRAINING PROGRAM

## 2024-04-14 PROCEDURE — 99232 SBSQ HOSP IP/OBS MODERATE 35: CPT | Performed by: INTERNAL MEDICINE

## 2024-04-14 RX ORDER — POTASSIUM CHLORIDE 14.9 MG/ML
20 INJECTION INTRAVENOUS
Status: DISCONTINUED | OUTPATIENT
Start: 2024-04-14 | End: 2024-04-14

## 2024-04-14 RX ORDER — POTASSIUM CHLORIDE 14.9 MG/ML
INJECTION INTRAVENOUS
Status: DISPENSED
Start: 2024-04-14 | End: 2024-04-14

## 2024-04-14 RX ORDER — FENTANYL CITRATE-0.9 % NACL/PF 10 MCG/ML
25 PLASTIC BAG, INJECTION (ML) INTRAVENOUS CONTINUOUS
Status: DISCONTINUED | OUTPATIENT
Start: 2024-04-14 | End: 2024-04-16

## 2024-04-14 RX ORDER — POTASSIUM CHLORIDE 1.5 G/1.58G
20 POWDER, FOR SOLUTION ORAL ONCE
Status: COMPLETED | OUTPATIENT
Start: 2024-04-14 | End: 2024-04-14

## 2024-04-14 RX ADMIN — POTASSIUM CHLORIDE 20 MEQ: 14.9 INJECTION, SOLUTION INTRAVENOUS at 07:30

## 2024-04-14 RX ADMIN — Medication: at 20:00

## 2024-04-14 RX ADMIN — VANCOMYCIN HYDROCHLORIDE 1250 MG: 1.25 INJECTION, POWDER, LYOPHILIZED, FOR SOLUTION INTRAVENOUS at 15:41

## 2024-04-14 RX ADMIN — THIAMINE HYDROCHLORIDE 200 MG: 100 INJECTION, SOLUTION INTRAMUSCULAR; INTRAVENOUS at 17:10

## 2024-04-14 RX ADMIN — CEFEPIME 500 MG: 1 INJECTION, POWDER, FOR SOLUTION INTRAMUSCULAR; INTRAVENOUS at 01:15

## 2024-04-14 RX ADMIN — FOLIC ACID 1 MG: 1 TABLET ORAL at 09:25

## 2024-04-14 RX ADMIN — LEVETIRACETAM 500 MG: 5 INJECTION INTRAVENOUS at 15:41

## 2024-04-14 RX ADMIN — THIAMINE HYDROCHLORIDE 200 MG: 100 INJECTION, SOLUTION INTRAMUSCULAR; INTRAVENOUS at 09:29

## 2024-04-14 RX ADMIN — LACTULOSE 20 G: 20 SOLUTION ORAL at 20:26

## 2024-04-14 RX ADMIN — Medication 25 MCG/HR: at 20:15

## 2024-04-14 RX ADMIN — LEVETIRACETAM 500 MG: 5 INJECTION INTRAVENOUS at 01:00

## 2024-04-14 RX ADMIN — OCTREOTIDE ACETATE 50 MCG/HR: 1000 INJECTION, SOLUTION INTRAVENOUS; SUBCUTANEOUS at 19:22

## 2024-04-14 RX ADMIN — OCTREOTIDE ACETATE 50 MCG/HR: 1000 INJECTION, SOLUTION INTRAVENOUS; SUBCUTANEOUS at 07:56

## 2024-04-14 RX ADMIN — DEXTROSE, SODIUM CHLORIDE, SODIUM LACTATE, POTASSIUM CHLORIDE, AND CALCIUM CHLORIDE 500 ML: 5; .6; .31; .03; .02 INJECTION, SOLUTION INTRAVENOUS at 10:45

## 2024-04-14 RX ADMIN — PANTOPRAZOLE SODIUM 40 MG: 40 INJECTION, POWDER, FOR SOLUTION INTRAVENOUS at 12:11

## 2024-04-14 RX ADMIN — POTASSIUM CHLORIDE 20 MEQ: 1.5 POWDER, FOR SOLUTION ORAL at 17:47

## 2024-04-14 RX ADMIN — CEFEPIME 500 MG: 1 INJECTION, POWDER, FOR SOLUTION INTRAMUSCULAR; INTRAVENOUS at 09:29

## 2024-04-14 RX ADMIN — Medication: at 08:00

## 2024-04-14 RX ADMIN — LACTULOSE 20 G: 20 SOLUTION ORAL at 15:41

## 2024-04-14 RX ADMIN — VANCOMYCIN HYDROCHLORIDE 1250 MG: 1.25 INJECTION, POWDER, LYOPHILIZED, FOR SOLUTION INTRAVENOUS at 02:35

## 2024-04-14 RX ADMIN — LACTULOSE 20 G: 20 SOLUTION ORAL at 09:25

## 2024-04-14 RX ADMIN — THIAMINE HYDROCHLORIDE 200 MG: 100 INJECTION, SOLUTION INTRAMUSCULAR; INTRAVENOUS at 20:27

## 2024-04-14 RX ADMIN — POTASSIUM CHLORIDE 20 MEQ: 14.9 INJECTION, SOLUTION INTRAVENOUS at 11:13

## 2024-04-14 RX ADMIN — ACETAMINOPHEN 650 MG: 325 TABLET ORAL at 12:11

## 2024-04-14 RX ADMIN — CEFEPIME 500 MG: 1 INJECTION, POWDER, FOR SOLUTION INTRAMUSCULAR; INTRAVENOUS at 17:10

## 2024-04-14 RX ADMIN — PANTOPRAZOLE SODIUM 40 MG: 40 INJECTION, POWDER, FOR SOLUTION INTRAVENOUS at 01:15

## 2024-04-14 RX ADMIN — Medication 1 TABLET: at 09:25

## 2024-04-14 RX ADMIN — TAMSULOSIN HYDROCHLORIDE 0.4 MG: 0.4 CAPSULE ORAL at 09:25

## 2024-04-14 RX ADMIN — DEXMEDETOMIDINE HYDROCHLORIDE 0.2 MCG/KG/HR: 400 INJECTION INTRAVENOUS at 01:15

## 2024-04-14 RX ADMIN — ACETAMINOPHEN 650 MG: 325 TABLET ORAL at 16:12

## 2024-04-14 ASSESSMENT — PAIN - FUNCTIONAL ASSESSMENT
PAIN_FUNCTIONAL_ASSESSMENT: 0-10

## 2024-04-14 ASSESSMENT — COGNITIVE AND FUNCTIONAL STATUS - GENERAL
WALKING IN HOSPITAL ROOM: TOTAL
DRESSING REGULAR LOWER BODY CLOTHING: TOTAL
DAILY ACTIVITIY SCORE: 6
TOILETING: TOTAL
DRESSING REGULAR UPPER BODY CLOTHING: TOTAL
MOVING FROM LYING ON BACK TO SITTING ON SIDE OF FLAT BED WITH BEDRAILS: A LOT
EATING MEALS: TOTAL
STANDING UP FROM CHAIR USING ARMS: TOTAL
PERSONAL GROOMING: TOTAL
CLIMB 3 TO 5 STEPS WITH RAILING: TOTAL
MOVING TO AND FROM BED TO CHAIR: TOTAL
MOBILITY SCORE: 7
HELP NEEDED FOR BATHING: TOTAL
TURNING FROM BACK TO SIDE WHILE IN FLAT BAD: TOTAL

## 2024-04-14 NOTE — PROGRESS NOTES
"Covering for Dr. Ca    Subjective Data:  Intubated, not sedated.  Opens eyes to commands.    Overnight Events:    Asked to see the patient secondary to arrest on the floor with ventricular tachycardia.  Patient's potassium was noted to be 3.2.  Patient underwent CPR with 1 round of epinephrine with ROSC.  Currently intubated.  Rhythm has been paced since yesterday.     Objective Data:  Last Recorded Vitals:  Vitals:    04/14/24 0500 04/14/24 0600 04/14/24 0700 04/14/24 0715   BP: 96/56 97/57 93/56    Pulse: 70 71 73    Resp: 17 17 16    Temp:    36.1 °C (97 °F)   TempSrc:       SpO2: 97% 96% 96%    Weight:  99 kg (218 lb 4.1 oz)     Height:           Last Labs:  CBC - 4/14/2024:  5:04 AM  8.5 7.2 73    22.2      CMP - 4/14/2024:  5:04 AM  8.4 5.2 109 --- 5.1   3.0 2.6 36 118      PTT - 4/13/2024:  3:52 AM  1.5   17.3 41     TROPHS   Date/Time Value Ref Range Status   04/13/2024 08:15 PM 41 0 - 20 ng/L Final   04/12/2024 10:33  0 - 20 ng/L Final   04/12/2024 06:40 PM 47 0 - 20 ng/L Final     BNP   Date/Time Value Ref Range Status   04/10/2024 10:03  0 - 99 pg/mL Final     VLDL   Date/Time Value Ref Range Status   09/24/2021 07:39 AM 28 0 - 40 mg/dL Final   07/17/2020 10:23 AM 11 0 - 40 mg/dL Final      Last I/O:  I/O last 3 completed shifts:  In: 4297.4 (43.4 mL/kg) [I.V.:1027.7 (10.4 mL/kg); Blood:3119.7; NG/GT:100; IV Piggyback:50]  Out: 1538 (15.5 mL/kg) [Urine:588 (0.2 mL/kg/hr); Emesis/NG output:950]  Weight: 99 kg     Past Cardiology Tests (Last 3 Years):  EKG:  Electrocardiogram, 12-lead PRN ACS symptoms 04/11/2024 (Preliminary)      ECG 12 lead 04/10/2024    Echo:  Transthoracic Echo (TTE) Complete 04/13/2024  CONCLUSIONS:   1. Left ventricular systolic function is normal with a 60-65% estimated ejection fraction.   2. RVSP within normal limits.    Ejection Fractions:  No results found for: \"EF\"  Cath:  No results found for this or any previous visit from the past 1095 days.    Stress Test:  No " results found for this or any previous visit from the past 1095 days.    Cardiac Imaging:  No results found for this or any previous visit from the past 1095 days.      Inpatient Medications:  Scheduled medications   Medication Dose Route Frequency    [Held by provider] allopurinol  300 mg oral Daily    [Held by provider] busPIRone  30 mg nasogastric tube q8h MICKEY    cefepime  500 mg intravenous q8h    folic acid  1 mg oral Daily    lactulose  20 g oral TID    levETIRAcetam  500 mg intravenous q12h    multivitamin with minerals  1 tablet oral Daily    oxygen   inhalation Continuous - Inhalation    pantoprazole  40 mg intravenous q12h    potassium chloride  20 mEq intravenous q2h    potassium chloride        tamsulosin  0.4 mg oral Daily    thiamine  200 mg intravenous TID    vancomycin  1,250 mg intravenous q12h     PRN medications   Medication    acetaminophen    meperidine    potassium chloride    vancomycin     Continuous Medications   Medication Dose Last Rate    dexmedeTOMIDine  0.1-1.5 mcg/kg/hr 0.2 mcg/kg/hr (04/14/24 0115)    dextrose 5 % and lactated Ringer's  125 mL/hr 125 mL/hr (04/13/24 1420)    octreotide  50 mcg/hr 50 mcg/hr (04/14/24 0756)       Physical Exam:  Physical Exam  Vitals reviewed.   Constitutional:       Interventions: He is intubated.   HENT:      Head: Normocephalic and atraumatic.      Mouth/Throat:      Mouth: Mucous membranes are moist.      Pharynx: Oropharynx is clear.   Eyes:      General: Scleral icterus present.      Extraocular Movements: Extraocular movements intact.      Conjunctiva/sclera: Conjunctivae normal.   Cardiovascular:      Rate and Rhythm: Normal rate and regular rhythm.      Pulses: Normal pulses.      Heart sounds: Normal heart sounds.   Pulmonary:      Effort: Pulmonary effort is normal. He is intubated.      Breath sounds: Normal breath sounds.   Abdominal:      General: Bowel sounds are normal.      Palpations: Abdomen is soft.   Musculoskeletal:      Cervical  back: Neck supple.   Skin:     General: Skin is warm and dry.      Coloration: Skin is jaundiced.   Neurological:      General: No focal deficit present.   Psychiatric:         Mood and Affect: Mood normal.         Behavior: Behavior normal.            Assessment/Plan   4/13/24  1.  Ventricular tachycardia/Torsades: Unclear etiology.  Suspect related to hypokalemia.  Has not recurred since admission to the ICU.  Cannot discount may related to atrial fibrillation/flutter.  2.  PAF: High risk for anticoagulation.  Heart rates currently stable.  Restart beta-blockade once pressors have been stopped.  3.  AV block status post pacemaker implantation  4.  Hypertension  5.  History of GI bleeding  6.  Alcohol abuse.    4/14/2024  1.  Ventricular tachycardia/Torsades: Unclear etiology.  Suspect related to hypokalemia.  Has not recurred since admission to the ICU.  Restart beta-blockade when able.  2.  PAF: High risk for anticoagulation.  Heart rates currently stable.  Restart beta-blockade once pressors have been stopped.  3.  AV block status post pacemaker implantation  4.  Hypertension  5.  History of GI bleeding  6.  Alcohol abuse.    Peripheral IV 04/12/24 22 G Right;Anterior Forearm (Active)   Site Assessment Clean;Dry;Intact 04/13/24 0900   Dressing Status Clean;Dry;Occlusive 04/13/24 0900   Number of days: 1       Peripheral IV 04/12/24 20 G Left Forearm (Active)   Site Assessment Clean;Dry;Intact 04/13/24 0900   Dressing Status Clean;Dry;Occlusive 04/13/24 0900   Number of days: 1       Peripheral IV 04/12/24 20 G Left Antecubital (Active)   Site Assessment Clean;Dry;Intact 04/13/24 0900   Dressing Status Clean;Dry;Occlusive 04/13/24 0900   Number of days: 1       Peripheral IV 04/13/24 18 G Right;Upper;Anterior Arm (Active)   Site Assessment Clean;Dry;Intact 04/13/24 0900   Dressing Status Clean;Dry;Occlusive 04/13/24 0900   Number of days: 0       ETT  7.5 mm (Active)   Secured at (cm) 27 cm 04/13/24 1012    Measured from Lips 04/13/24 1019   Secured Location Center 04/13/24 1019   Secured by Commercial tube castro 04/13/24 1019   Site Condition Cool;Dry 04/13/24 1019   Number of days: 1       NG/OG/Feeding Tube Right nostril (Active)   Site Assessment Clean;Dry;Intact 04/13/24 0549   Number of days: 1       Urethral Catheter Non-latex 16 Fr. (Active)   Site Assessment Clean;Skin intact 04/13/24 1116   Number of days: 1       Code Status:  Full Code    Tyrone Carrillo MD

## 2024-04-14 NOTE — PROGRESS NOTES
"Vancomycin Dosing by Pharmacy- FOLLOW UP    Trey Borjas is a 64 y.o. year old male who Pharmacy has been consulted for vancomycin dosing for pneumonia. Based on the patient's indication and renal status this patient is being dosed based on a goal AUC of 400-600.     Renal function is currently stable.    Current vancomycin dose: 1250 mg given every 12 hours    Estimated vancomycin AUC on current dose: 470 mg/L.hr     Visit Vitals  BP 93/52   Pulse 84   Temp 36.1 °C (97 °F)   Resp 15        Lab Results   Component Value Date    CREATININE 0.72 04/14/2024    CREATININE 0.82 04/13/2024    CREATININE 0.87 04/13/2024    CREATININE 0.98 04/12/2024        Patient weight is No results found for: \"PTWEIGHT\"    No results found for: \"CULTURE\"     I/O last 3 completed shifts:  In: 4297.4 (43.4 mL/kg) [I.V.:1027.7 (10.4 mL/kg); Blood:3119.7; NG/GT:100; IV Piggyback:50]  Out: 1538 (15.5 mL/kg) [Urine:588 (0.2 mL/kg/hr); Emesis/NG output:950]  Weight: 99 kg   [unfilled]    Lab Results   Component Value Date    PATIENTTEMP 37.0 04/13/2024    PATIENTTEMP 37.0 04/13/2024    PATIENTTEMP 37.0 04/12/2024        Assessment/Plan    Within goal AUC range. Continue current vancomycin regimen.    This dosing regimen is predicted by [employer] to result in the following pharmacokinetic parameters:  Loading dose: N/A  Regimen: 1250 mg IV every 12 hours.  Start time: 14:35 on 04/14/2024  Exposure target: AUC24 (range)400-600 mg/L.hr   AUC24,ss: 470 mg/L.hr  Probability of AUC24 > 400: 78 %  Ctrough,ss: 11.2 mg/L  Probability of Ctrough,ss > 20: 3 %  Probability of nephrotoxicity (Lodise ALYSSA 2009): 7 %    The next level will be obtained on 4/16 at 1000. May be obtained sooner if clinically indicated.   Will continue to monitor renal function daily while on vancomycin and order serum creatinine at least every 48 hours if not already ordered.  Follow for continued vancomycin needs, clinical response, and signs/symptoms of toxicity. "       Fox Nunes, PharmD

## 2024-04-14 NOTE — PROGRESS NOTES
"Katrina Borjas is a 64 y.o. male on day 2 of admission presenting with Atrial flutter, unspecified type (Multi).        Subjective   Seen this morning, still on sedation with Precedex and mechanical myelination.  Brother at bedside.  Patient is responding to commands appropriately however.  Blood pressure stable.  NG tube producing dark appearing output.  H&H dropping.  May require transfusion.  ICU team aware.       Objective     Last Recorded Vitals  BP 93/56   Pulse 73   Temp 36.1 °C (97 °F)   Resp 16   Ht 1.829 m (6' 0.01\")   Wt 99 kg (218 lb 4.1 oz)   SpO2 96%   BMI 29.59 kg/m²     Intake/Output last 3 Shifts:  I/O last 3 completed shifts:  In: 4297.4 (43.4 mL/kg) [I.V.:1027.7 (10.4 mL/kg); Blood:3119.7; NG/GT:100; IV Piggyback:50]  Out: 1538 (15.5 mL/kg) [Urine:588 (0.2 mL/kg/hr); Emesis/NG output:950]  Weight: 99 kg       =========RELEVANT RESULTS ==========  Labs  Lab Results   Component Value Date    WBC 8.5 04/14/2024    HGB 7.2 (L) 04/14/2024    HCT 22.2 (L) 04/14/2024     (H) 04/14/2024    PLT 73 (L) 04/14/2024     Lab Results   Component Value Date    GLUCOSE 174 (H) 04/14/2024    CALCIUM 8.4 (L) 04/14/2024     04/14/2024    K 3.4 (L) 04/14/2024    CO2 28 04/14/2024     04/14/2024    BUN 10 04/14/2024    CREATININE 0.72 04/14/2024      Lab Results   Component Value Date    ALT 36 04/14/2024     (H) 04/14/2024    ALKPHOS 118 04/14/2024    BILITOT 5.1 (H) 04/14/2024        Recent Echocardiogram (14D):   Transthoracic Echo (TTE) Complete    Result Date: 4/13/2024    [facility], [address] [facility] [address] [place], [place] [address]Iux [phone] and                                 Fax [phone] TRANSTHORACIC ECHOCARDIOGRAM REPORT  Patient Name:      KATRINA De Anda Physician:    [id number] Tyrone Carrillo MD Study Date:        4/13/2024            Ordering Provider:    [id number] LAKE"                                     THADDEUS MRN/PID:           [id number]             Fellow: Accession#:        [id number]         Nurse: Date of Birth/Age: 1960 / 64 years Sonographer:          Lyly Blakely RDCS Gender:            M                    Additional Staff: Height:            182.00 cm            Admit Date:           4/12/2024 Weight:            95.00 kg             Admission Status:     Inpatient - Time                                                               Critical BSA / BMI:         2.16 m2 / 28.68      Encounter#:           [id number]                    kg/m2                                         Department Location:  [address]                                                               [facility] Blood Pressure: 117 /61 mmHg Study Type:    TRANSTHORACIC ECHO (TTE) COMPLETE Diagnosis/ICD: Unspecified atrial flutter-I48.92 Indication:    s/p cardiac arrest CPT Code:      Echo Complete w Full [id number] Patient History: Pertinent History: HTN. Study Detail: The following Echo studies were performed: 2D, M-Mode, Doppler and               color flow. Technically challenging study due to patient lying in               supine position, movement artifact from an oscillating ventilator,               prominent lung artifact and chest tubes.  PHYSICIAN INTERPRETATION: Left Ventricle: The left ventricular systolic function is normal, with an estimated ejection fraction of 60-65%. There are no regional wall motion abnormalities. The left ventricular cavity size is normal. There is mild concentric left ventricular hypertrophy. Left ventricular diastolic filling was indeterminate. Left Atrium: The left atrium is normal in size. Right Ventricle: The right ventricle is normal in size. There is normal right ventricular global systolic function. A device is visualized in the right ventricle. Right Atrium: The right atrium is  normal in size. There is a device visualized in the right atrium. Aortic Valve: The aortic valve is trileaflet. There is no evidence of aortic valve regurgitation. The peak instantaneous gradient of the aortic valve is 9.5 mmHg. The mean gradient of the aortic valve is 5.0 mmHg. Mitral Valve: The mitral valve is normal in structure. There is trace mitral valve regurgitation. Tricuspid Valve: The tricuspid valve is structurally normal. There is trace tricuspid regurgitation. The Doppler estimated RVSP is within normal limits at 24.3 mmHg. Pulmonic Valve: The pulmonic valve is not well visualized. There is no indication of pulmonic valve regurgitation. Pericardium: There is no pericardial effusion noted. Aorta: The aortic root is normal.  CONCLUSIONS:  1. Left ventricular systolic function is normal with a 60-65% estimated ejection fraction.  2. RVSP within normal limits. QUANTITATIVE DATA SUMMARY: 2D MEASUREMENTS:                           Normal Ranges: IVSd:          1.26 cm    (0.6-1.1cm) LVPWd:         1.13 cm    (0.6-1.1cm) LVIDd:         4.85 cm    (3.9-5.9cm) LVIDs:         2.73 cm LV Mass Index: 102.3 g/m2 LV % FS        43.7 % AORTA MEASUREMENTS:                    Normal Ranges: Asc Ao, d: 3.40 cm (2.1-3.4cm) LV SYSTOLIC FUNCTION BY 2D PLANIMETRY (MOD):                     Normal Ranges: EF-A4C View: 59.7 % (>=55%) EF-A2C View: 57.9 % EF-Biplane:  59.6 % AORTIC VALVE:                                   Normal Ranges: AoV Vmax:                1.54 m/s (<=1.7m/s) AoV Peak P.5 mmHg (<20mmHg) AoV Mean P.0 mmHg (1.7-11.5mmHg) LVOT Max Dmitriy:            1.51 m/s (<=1.1m/s) AoV VTI:                 25.70 cm (18-25cm) LVOT VTI:                24.20 cm LVOT Diameter:           2.00 cm  (1.8-2.4cm) AoV Area, VTI:           2.96 cm2 (2.5-5.5cm2) AoV Area,Vmax:           3.08 cm2 (2.5-4.5cm2) AoV Dimensionless Index: 0.94  RIGHT VENTRICLE: RV Basal 3.23 cm RV Mid   2.02 cm RV Major 7.1 cm  TAPSE:   22.9 mm RV s'    0.23 m/s TRICUSPID VALVE/RVSP:                             Normal Ranges: Peak TR Velocity: 2.31 m/s RV Syst Pressure: 24.3 mmHg (< 30mmHg) PULMONIC VALVE:                      Normal Ranges: PV Max Dmitriy: 1.4 m/s  (0.6-0.9m/s) PV Max P.0 mmHg PV Mean P.0 mmHg PV VTI:     23.00 cm  [id number] Tyrone Carrillo MD Electronically signed on 2024 at 12:26:46 PM  ** Final **      TTE 12 month if available:  Transthoracic Echo (TTE) Complete    Result Date: 2024    [facility], [address] [place] [id number] [phone] and                                 Fax [phone] TRANSTHORACIC ECHOCARDIOGRAM REPORT  Patient Name:      KATRINA TAPIA       Reading Physician:    [id number] Carrillo MD Study Date:        2024            Ordering Provider:    [id number] JAYA TRAVIS MRN/PID:           [id number]             Fellow: Accession#:        [id number]         Nurse: Date of Birth/Age: 1960 / 64 years Sonographer:          Lyly Blakely RDCS Gender:            M                    Additional Staff: Height:            182.00 cm            Admit Date:           2024 Weight:            95.00 kg             Admission Status:     Inpatient - Time                                                               Critical BSA / BMI:         2.16 m2 / 28.68      Encounter#:           [id number]                    kg/m2                                         Department Location:  [address]                                                               [facility] Blood Pressure: 117 /61 mmHg Study Type:    TRANSTHORACIC ECHO (TTE) COMPLETE Diagnosis/ICD: Unspecified atrial flutter-I48.92 Indication:    s/p cardiac arrest CPT Code:      Echo Complete w Full Doppler-[id number] Patient  History: Pertinent History: HTN. Study Detail: The following Echo studies were performed: 2D, M-Mode, Doppler and               color flow. Technically challenging study due to patient lying in               supine position, movement artifact from an oscillating ventilator,               prominent lung artifact and chest tubes.  PHYSICIAN INTERPRETATION: Left Ventricle: The left ventricular systolic function is normal, with an estimated ejection fraction of 60-65%. There are no regional wall motion abnormalities. The left ventricular cavity size is normal. There is mild concentric left ventricular hypertrophy. Left ventricular diastolic filling was indeterminate. Left Atrium: The left atrium is normal in size. Right Ventricle: The right ventricle is normal in size. There is normal right ventricular global systolic function. A device is visualized in the right ventricle. Right Atrium: The right atrium is normal in size. There is a device visualized in the right atrium. Aortic Valve: The aortic valve is trileaflet. There is no evidence of aortic valve regurgitation. The peak instantaneous gradient of the aortic valve is 9.5 mmHg. The mean gradient of the aortic valve is 5.0 mmHg. Mitral Valve: The mitral valve is normal in structure. There is trace mitral valve regurgitation. Tricuspid Valve: The tricuspid valve is structurally normal. There is trace tricuspid regurgitation. The Doppler estimated RVSP is within normal limits at 24.3 mmHg. Pulmonic Valve: The pulmonic valve is not well visualized. There is no indication of pulmonic valve regurgitation. Pericardium: There is no pericardial effusion noted. Aorta: The aortic root is normal.  CONCLUSIONS:  1. Left ventricular systolic function is normal with a 60-65% estimated ejection fraction.  2. RVSP within normal limits. QUANTITATIVE DATA SUMMARY: 2D MEASUREMENTS:                           Normal Ranges: IVSd:          1.26 cm    (0.6-1.1cm) LVPWd:         1.13 cm     (0.6-1.1cm) LVIDd:         4.85 cm    (3.9-5.9cm) LVIDs:         2.73 cm LV Mass Index: 102.3 g/m2 LV % FS        43.7 % AORTA MEASUREMENTS:                    Normal Ranges: Asc Ao, d: 3.40 cm (2.1-3.4cm) LV SYSTOLIC FUNCTION BY 2D PLANIMETRY (MOD):                     Normal Ranges: EF-A4C View: 59.7 % (>=55%) EF-A2C View: 57.9 % EF-Biplane:  59.6 % AORTIC VALVE:                                   Normal Ranges: AoV Vmax:                1.54 m/s (<=1.7m/s) AoV Peak P.5 mmHg (<20mmHg) AoV Mean P.0 mmHg (1.7-11.5mmHg) LVOT Max Dmitriy:            1.51 m/s (<=1.1m/s) AoV VTI:                 25.70 cm (18-25cm) LVOT VTI:                24.20 cm LVOT Diameter:           2.00 cm  (1.8-2.4cm) AoV Area, VTI:           2.96 cm2 (2.5-5.5cm2) AoV Area,Vmax:           3.08 cm2 (2.5-4.5cm2) AoV Dimensionless Index: 0.94  RIGHT VENTRICLE: RV Basal 3.23 cm RV Mid   2.02 cm RV Major 7.1 cm TAPSE:   22.9 mm RV s'    0.23 m/s TRICUSPID VALVE/RVSP:                             Normal Ranges: Peak TR Velocity: 2.31 m/s RV Syst Pressure: 24.3 mmHg (< 30mmHg) PULMONIC VALVE:                      Normal Ranges: PV Max Dmitriy: 1.4 m/s  (0.6-0.9m/s) PV Max P.0 mmHg PV Mean P.0 mmHg PV VTI:     23.00 cm  [id number] Tyrone Carrillo MD Electronically signed on 2024 at 12:26:46 PM  ** Final **      Recent Imaging Results:  CT head wo IV contrast  Narrative: Interpreted By:  Jemma Landis,   STUDY:  CT HEAD WO IV CONTRAST;  2024 4:21 am      INDICATION:  Signs/Symptoms:SDH, interval scan for progression.      COMPARISON:  2024      ACCESSION NUMBER(S):  [id number]      ORDERING CLINICIAN:  OKSANA PANIAGUA      TECHNIQUE:  Noncontrast axial CT scan of head was performed. Angled reformats in  brain and bone windows were generated. The images were reviewed in  bone, brain, blood and soft tissue windows. Coronal and sagittal  reformats are provided for review.      FINDINGS:  CSF Spaces: There is  again a small amount of hyperdense subdural  hemorrhage along the posterior cerebral convexity, dorsal right  aspect of the tentorium, and dorsal right aspect of the falx overall  very similar from the previous examination. There is minimal mass  effect on subjacent sulci but no measurable midline shift. There is  again prominence of ventricles and sulci compatible with diffuse  parenchymal volume loss.      Parenchyma:  There are nonspecific areas of diminished attenuation in  the subcortical and periventricular white matter. Otherwise, the  grey-white differentiation is intact. There is no mass effect or  midline shift.  There is no intracranial hemorrhage.      Calvarium: The calvarium is unremarkable.      Paranasal sinuses and mastoids: There is hyperostosis of the left  maxillary sinus walls. Mucosal thickening and fluid are noted within  the maxillary sinuses, left greater than right. There is  opacification of several ethmoid air cells, left greater than right.  There are polypoid appearing foci in the nasal cavities. The mastoid  air cells are clear.      There is nonspecific opacity in the external auditory canals which  could represent cerumen. Correlation with direct visualization is  recommended.      An enteric tube is partially visualized. There are secretions in the  nasopharynx.      Impression: Redemonstration of subdural hematoma overlying the posterior cerebral  convexity on the right and extending along the right tentorium and  the dorsal right aspect of the falx overall very similar from the  previous examination. There is minimal local mass effect but no  measurable midline shift.          MACRO:  None      Signed by: Jemma Landis 4/14/2024 6:47 AM  Dictation workstation:   [id number]      Exam     GENERAL: On sedation and mechanical ventilation.   HEENT: ET tube and OG tube in place.  CARDIOVASCULAR: RRR, no murmurs  RESPIRATORY: Patent airways, on mechanical ventilation   ABDOMEN: Soft,  Normal Bowel Sounds, at baseline distention  SKIN: Warm and dry, no lesions, no rashes  EXTREMITIES: normal extremities, no significant cyanosis edema, contusions or wounds, no obsvious clubbing      Additional Physical Exam Notes/Findings        ======= SCHEDULED MEDICATIONS =======  Scheduled medications   Medication Dose Route Frequency    [Held by provider] allopurinol  300 mg oral Daily    [Held by provider] busPIRone  30 mg nasogastric tube q8h MICKEY    cefepime  500 mg intravenous q8h    folic acid  1 mg oral Daily    lactulose  20 g oral TID    levETIRAcetam  500 mg intravenous q12h    multivitamin with minerals  1 tablet oral Daily    oxygen   inhalation Continuous - Inhalation    pantoprazole  40 mg intravenous q12h    potassium chloride  20 mEq intravenous q2h    potassium chloride        tamsulosin  0.4 mg oral Daily    thiamine  200 mg intravenous TID    vancomycin  1,250 mg intravenous q12h       ========== PRN MEDICATIONS =========  acetaminophen, 650 mg, q6h PRN  meperidine, 12.5 mg, q4h PRN  potassium chloride, ,   vancomycin, , Daily PRN        ==============  DIET  ==============  Dietary Orders (From admission, onward)       Start     Ordered    04/12/24 2136  NPO Diet; Effective now  Diet effective now         04/12/24 2137                    ====== Assessment/Plan   =======    ASSESSMENT:  Active Problems:    Atrial flutter, unspecified type (Multi)    ___________________________________________________    Acute cardiac arrest  Acute subdural hemorrhage  Acute Metabolic encephalopathy  Acute hypoxic respiratory failure  Atrial Flutter  Mild metabolic acidosis  Alcohol dependence  Etoh cirrhosis h/o GIB  Hypokalemia  Peripheral Neuropathy b/l feet      PLAN:  Downtrending H&H.   GI following.  PPI  Repeat CT head this AM  TTE unremarkable.   BP soft at times but stable.     Mgmt per ICU.     DVT Prophylaxis  Held for GIB concern    SUMMARY/DISPOSITION  Mgmt per ICU team at this time.                  Sergei Gillis, DO

## 2024-04-14 NOTE — CARE PLAN
Problem: Pain  Goal: My pain/discomfort is manageable  Outcome: Progressing     Problem: Safety  Goal: Patient will be injury free during hospitalization  Outcome: Progressing  Goal: I will remain free of falls  Outcome: Progressing     Problem: Daily Care  Goal: Daily care needs are met  Outcome: Progressing     Problem: ACS/CP/NSTEMI/STEMI  Goal: Chest pain managed (free from pain or at acceptable level)  Outcome: Progressing  Goal: Lab values return to normal range  Outcome: Progressing  Goal: Serial ECG will return to baseline  Outcome: Progressing  Goal: Wean vasopressors/achieve hemodynamic stability  Outcome: Progressing     Problem: Arrythmia/Dysrhythmia  Goal: Lab values return to normal range  Outcome: Progressing  Goal: No evidence of post procedure complications  Outcome: Progressing  Goal: Promote self management  Outcome: Progressing  Goal: Serial ECG will return to baseline  Outcome: Progressing  Goal: Verbalize understanding of procedures/devices  Outcome: Progressing  Goal: Vital signs return to baseline  Outcome: Progressing  Goal: Care and maintenance of device (specify)  Outcome: Progressing     Problem: Cardiac catheterization  Goal: Free from dysrhythmias  Outcome: Progressing  Goal: Free from pain  Outcome: Progressing  Goal: No evidence of post procedure complications  Outcome: Progressing  Goal: Promote self management  Outcome: Progressing  Goal: Verbalize understanding of procedure  Outcome: Progressing  Goal: Care and maintenance of device (specify)  Outcome: Progressing     Problem: Hypertensive Emergency/Crisis  Goal: Blood pressure gradually reduced to goal range  Outcome: Progressing  Goal: Lab values return to normal range  Outcome: Progressing  Goal: Promote self management  Outcome: Progressing     Problem: Respiratory  Goal: Clear secretions with interventions this shift  Outcome: Progressing  Goal: Minimize anxiety/maximize coping throughout shift  Outcome: Progressing  Goal:  Minimal/no exertional discomfort or dyspnea this shift  Outcome: Progressing  Goal: No signs of respiratory distress (eg. Use of accessory muscles. Peds grunting)  Outcome: Progressing  Goal: Patent airway maintained this shift  Outcome: Progressing  Goal: Tolerate mechanical ventilation evidenced by VS/agitation level this shift  Outcome: Progressing  Goal: Tolerate pulmonary toileting this shift  Outcome: Progressing  Goal: Verbalize decreased shortness of breath this shift  Outcome: Progressing  Goal: Wean oxygen to maintain O2 saturation per order/standard this shift  Outcome: Progressing  Goal: Increase self care and/or family involvement in next 24 hours  Outcome: Progressing       Problem: Psychosocial Needs  Goal: Demonstrates ability to cope with hospitalization/illness  Outcome: Not Progressing  Goal: Collaborate with me, my family, and caregiver to identify my specific goals  Outcome: Not Progressing     Problem: Discharge Barriers  Goal: My discharge needs are met  Outcome: Not Progressing     Problem: ACS/CP/NSTEMI/STEMI  Goal: Promote self management  Outcome: Not Progressing  Goal: Verbalize understanding of procedures/devices  Outcome: Not Progressing     Problem: Hypertensive Emergency/Crisis  Goal: Free from signs of organ damage  Outcome: Not Progressing

## 2024-04-14 NOTE — PROGRESS NOTES
Department of Internal Medicine  Gastroenterology  Progress note      Subjective  GI is following for blood in OG.    Today, he remains intubated. He had about 650 ml of dark output overnight. Small smear. Otherwise no acute event    Patient did pass SBT.  Discussed with GI and ICU.  Patient to remain intubated for EGD on 4/15/2024.  Family is aware.    Current Medication    Current Facility-Administered Medications:     acetaminophen (Tylenol) tablet 650 mg, 650 mg, oral, q6h PRN, Latoya Aleman MD    [Held by provider] allopurinol (Zyloprim) tablet 300 mg, 300 mg, oral, Daily, Latoya Aleman MD, 300 mg at 04/11/24 0857    [Held by provider] busPIRone (Buspar) tablet 30 mg, 30 mg, nasogastric tube, q8h MICKEY, Latoya Aleman MD    cefepime (Maxipime) 500 mg in dextrose 5 % in water (D5W) 50 mL IV, 500 mg, intravenous, q8h, Latoya Aleman MD, Stopped at 04/14/24 0145    dexmedeTOMIDine (Precedex) 400 mcg in 100 mL (4 mcg/mL) sodium chloride 0.9% infusion, 0.1-1.5 mcg/kg/hr, intravenous, Continuous, Latoya Aleman MD, Last Rate: 4.81 mL/hr at 04/14/24 0115, 0.2 mcg/kg/hr at 04/14/24 0115    dextrose 5 % and lactated Ringer's infusion, 125 mL/hr, intravenous, Continuous, Latoya Aleman MD, Last Rate: 125 mL/hr at 04/13/24 1420, 125 mL/hr at 04/13/24 1420    folic acid (Folvite) tablet 1 mg, 1 mg, oral, Daily, Latoya Aleman MD, 1 mg at 04/12/24 1124    lactulose 20 gram/30 mL oral solution 20 g, 20 g, oral, TID, Latoya Aleman MD, 20 g at 04/13/24 2110    levETIRAcetam in NaCl (iso-os) (Keppra)  mg, 500 mg, intravenous, q12h, Bill Patterson MD, Stopped at 04/14/24 0115    meperidine PF (Demerol) injection 12.5 mg, 12.5 mg, intravenous, q4h PRN, Latoya Aleman MD    multivitamin with minerals 1 tablet, 1 tablet, oral, Daily, Latoya Aleman MD, 1 tablet at 04/12/24 1142    octreotide (SandoSTATIN) 500 mcg in sodium chloride 0.9% 100 mL (5 mcg/mL) infusion, 50 mcg/hr, intravenous, Continuous, Bill  MD Leonardo, Last Rate: 10 mL/hr at 04/13/24 2254, 50 mcg/hr at 04/13/24 2254    oxygen (O2) therapy, , inhalation, Continuous - Inhalation, Latoya Aleman MD, Start at 04/14/24 0800    pantoprazole (ProtoNix) injection 40 mg, 40 mg, intravenous, q12h, 40 mg at 04/14/24 0115 **FOLLOWED BY** [DISCONTINUED] pantoprazole (ProtoNix) EC tablet 40 mg, 40 mg, oral, q12h, Bill Patterson MD    potassium chloride 20 mEq in 100 mL IV premix, 20 mEq, intravenous, q2h, Bill Patterson MD, Last Rate: 50 mL/hr at 04/14/24 0730, 20 mEq at 04/14/24 0730    potassium chloride IVPB  - [employer] Override Pull, , , ,     tamsulosin (Flomax) 24 hr capsule 0.4 mg, 0.4 mg, oral, Daily, Latoya Aleman MD, 0.4 mg at 04/12/24 1124    thiamine (Vitamin B1) 200 mg in dextrose 5 % in water (D5W) 100 mL IV, 200 mg, intravenous, TID, Bill Patterson MD, Stopped at 04/13/24 2138    vancomycin (Vancocin) in dextrose 5 % water (D5W) 250 mL IV 1,250 mg, 1,250 mg, intravenous, q12h, Latoya Aleman MD, Stopped at 04/14/24 0350    vancomycin (Vancocin) pharmacy to dose - pharmacy monitoring, , miscellaneous, Daily PRN, Latoya Aleman MD    Past Medical History  Active Ambulatory Problems     Diagnosis Date Noted    Male erectile disorder 07/27/2023    Abnormal LFTs 07/27/2023    Alcohol abuse 07/27/2023    Benign essential HTN 07/27/2023    Bilateral plantar fasciitis 07/27/2023    Degeneration of intervertebral disc of lumbar region 07/27/2023    Elevated PSA 07/27/2023    Esophagitis 07/27/2023    Gout 10/02/2018    Hepatic steatosis 07/27/2023    Neuropathy, peripheral 07/27/2023    Pacemaker 07/27/2023    Prostate cancer (Multi) 07/27/2023    Spinal stenosis of lumbar region 07/27/2023    Chronic obstructive pulmonary disease with (acute) lower respiratory infection (Multi) 10/12/2015    Hypothyroidism 07/02/2014    Rheumatoid arthritis (Multi) 10/12/2018     Resolved Ambulatory Problems     Diagnosis Date Noted    Essential hypertension 04/03/2013  "    Past Medical History:   Diagnosis Date    Alcohol abuse with withdrawal, unspecified (Multi)     Personal history of other diseases of the circulatory system     Personal history of other diseases of the nervous system and sense organs 07/02/2020    Personal history of other diseases of urinary system     Post covid-19 condition, unspecified 12/16/2021    Strain of muscle and tendon of unspecified wall of thorax, initial encounter 09/27/2016    Unspecified otitis externa, unspecified ear 08/11/2016       PHYSICAL EXAM  VS: BP 93/56   Pulse 73   Temp 36.1 °C (97 °F)   Resp 16   Ht 1.829 m (6' 0.01\")   Wt 99 kg (218 lb 4.1 oz)   SpO2 96%   BMI 29.59 kg/m²  Body mass index is 29.59 kg/m².  Physical Exam  Constitutional:       General: He is not in acute distress.     Comments: Intubated, responses to verbal stimuli   HENT:      Mouth/Throat:      Mouth: Mucous membranes are moist.      Pharynx: Oropharynx is clear.   Eyes:      General: Scleral icterus present.      Extraocular Movements: Extraocular movements intact.      Conjunctiva/sclera: Conjunctivae normal.      Pupils: Pupils are equal, round, and reactive to light.   Cardiovascular:      Rate and Rhythm: Normal rate and regular rhythm.      Heart sounds: No murmur heard.  Pulmonary:      Effort: Pulmonary effort is normal.      Breath sounds: No wheezing or rhonchi.   Abdominal:      General: Bowel sounds are normal. There is no distension.      Palpations: Abdomen is soft. There is no mass.      Tenderness: There is no abdominal tenderness.      Hernia: No hernia is present.   Musculoskeletal:         General: No swelling or deformity.   Skin:     General: Skin is warm.      Coloration: Skin is jaundiced.      Comments: Tattoos noted   Neurological:      General: No focal deficit present.      Mental Status: He is alert and oriented to person, place, and time.   Psychiatric:         Mood and Affect: Mood normal.          DATA  Recent blood work was " reviewed and discussed with the patients brother  Results from last 7 days   Lab Units 04/14/24  0504   WBC AUTO x10*3/uL 8.5   RBC AUTO x10*6/uL 2.16*   HEMOGLOBIN g/dL 7.2*   HEMATOCRIT % 22.2*   MCV fL 103*   MCHC g/dL 32.4   RDW % 16.6*   PLATELETS AUTO x10*3/uL 73*       Results from last 72 hours   Lab Units 04/14/24  0504   SODIUM mmol/L 139   POTASSIUM mmol/L 3.4*   CHLORIDE mmol/L 107   CO2 mmol/L 28   BUN mg/dL 10   CREATININE mg/dL 0.72   CALCIUM mg/dL 8.4*   PROTEIN TOTAL g/dL 5.2*   BILIRUBIN TOTAL mg/dL 5.1*   ALK PHOS U/L 118   AST U/L 109*   ALT U/L 36       Results from last 72 hours   Lab Units 04/14/24  0504   INR  1.5*             RADIOLOGY REVIEW  CT head without contrast 4/14/2024  IMPRESSION:  Redemonstration of subdural hematoma overlying the posterior cerebral convexity on the right and extending along the right tentorium and  the dorsal right aspect of the falx overall very similar from the  previous examination. There is minimal local mass effect but no  measurable midline shift.    ENDOSCOPIC REVIEW  NA    IMPRESSION/RECOMMENDATIONS  Patient is a 64 y.o. male with signficant past medical history of hypertension, gout, alcohol abuse, complete heart block status post pacemaker, NAE, and neuropathy who presents for alcohol intoxication.  He was found to be in a flutter in the ER and cardiology was consulted.  Original plan was to discharge and follow-up as outpatient but patient went through withdrawal. On 4/12 patient had a run of V. tach and CODE BLUE was called.  CPR was initiated.  ROSC was obtained and patient was sent to ICU intubated.  Went for CT head which found a subdural hemorrhage in the right posterior cerebral hemisphere.  Neurosurgery was called who did not recommend surgery.  An NG tube was placed and he was noted to have some blood.  PPI and octreotide were started.     S/P V tach arrest  Concern for cirrhosis - MELD 21. Alcohol abuse. Noted on US 4/24, EGD 11/2022 with  pHTN, fibroscan 2/23 F1. HFE, autoimmune negative, hep c negative.  Platelets 73 prior to admit were normal, INR 1.9, prior to admit normal.  AST ALT flip consistent with alcohol abuse.  Elevated total bilirubin and low albumin.  No hyponatremia. Cirrhosis is likely although labs falsely elevated in acute setting  Concern for UGIB - hgb (4/13) 7.2, baselines 10. Blood in NG. Low concern for variceal bleed. S/p 2u plasma and 2u plts  Subdural hematoma      PLAN  Will obtain EGD 4/15/2024.  Patient to remain intubated for procedure.  Brother Felipe will consent  Continue octreotide for 72 hours  Continue Protonix 40 mg twice daily  Agree with plt goal above 75 and INR below 1.6.   Consider use of lactulose, either through OG or enema to cover HE   Continue n.p.o. status other than meds  Continue supportive care per primary team     Discussed with REYES Verdugo to follow    (Electronically signed byValencia Cid PA-C on 4/14/2024 at 7:46 AM)

## 2024-04-14 NOTE — PROGRESS NOTES
"Critical Care Progress Note    Patient Name: Trey Borjas   YOB: 1960    Subjective:  Patient intubated and sedated, follows commands.    Objective:    BP 93/56   Pulse 73   Temp 36.1 °C (97 °F)   Resp 16   Ht 1.829 m (6' 0.01\")   Wt 99 kg (218 lb 4.1 oz)   SpO2 96%   BMI 29.59 kg/m²     Physical Exam:  GEN: intubate, results to name, follows commands  HEENT: eyes open to voice, ET tube in place  NECK: supple, no cervical LAD, no carotid bruits  CV: not tachy  PULM: mechanical ventilation  ABD: soft, NT, ND, BS+  EXT: no LE edema  NEURO: no gross focal deficits    Assessment/Plan:  Trey Borjas is a 62yo man with past medical history of HTN, gout, neuropathy, alcohol abuse, smoking, Afib, complete heart block s/p pacemaker who was initially admitted on 4/10/23 for aflutter and monitoring for alcohol withdrawal. Code Blue was called on 4/12/24 with patient developing pVT, CPR was initiated, patient obtained ROSC within minutes of code without defibrillation. Patient was intubated during this time. Patient transferred to ICU for post-arrest care. CTH showed subdural hemorrhage in the posterior right cerebral hemisphere, on-call neurosurgeon Dr. Sahu reviewed images and recommended no role for surgery at this time. Patient also reported having blood via OG tube and PPI and octreotide gtt were initiated. Vitamin K and FFP given as INR> 1.5.    Neuro:  #SDH  #Possible Hepatic Encephalopathy  #Possible Wernicke Encephalopathy  #Metabolic Encephalopathy secondary to arrest  - Neurosurgery following, no surgical intervention at this time, CTH showed stable SDH Goal INR < 1.6 Plt > 75 Keppra 500mg BID  - Maintain SBP < 140  - Plan to discontinue precedex gtt and extubate patient today  - IV thiamine for possible wernicke, continue multivitamin, and folic acid  - Continue lactulose    Cardiovascular  #S/p Cardiac Arrest (pVtach)  #Aflutter  - Maintain K>4 and Mg > 2  - Cardiology following, echo " showed EF 60-65%, RVSP normal limits  - Patient off levo gtt, will continue with IVF D5LR 125 ml/he    Pulmonary:  #Respiratory failure s/p cardiac arrest  - Patient intubated 4/12, plan for extubation today    GI:  #Concern for UGIB  #Hepatic Cirrhosis  #Transaminitis - suspect secondary to alcoholic hepatitis   #Hyperbilirubinemia  - 40mg Protonix BID  - Octreotide gtt   - GI following, continue octreotide for 72 hours, plan for possible EGD tomorrow 4/15  - EGD 11/2022 showed no varices or bleeding ulcers  - Continue with lactulose    Renal:   #Lactic acidosis  - Trend Lactate  - Monitor urine output  - K >4 Mg >2    Endocrine:  #T2DM  A1c 8.5  - Goal blood glucose 140-180    Heme/Onc:  #Concern for blood loss anemia  #Elevated INR  #Thrombocytopenia  - Transfuse to maintain goal Plt > 75 and INR < 1.6  - Maintain T&S  - Will transfuse 1 unit platelets this morning, follow-up CBC this afternoon    ID:  #Possible CAP  CT Chest 4/12 GGO in [facility] and [facility]  - Broad spectrum antibiotics with Vanc and Cefepime   - Follow-up cultures and PNA work-up; MRSA negative    ICU CHECK LIST:   Antimicrobials: Vanc, Cefepime (day 2)  Oxygen: Mechanically ventilated  Feeding: NPO in setting of UGIB  Fluids: D5LR 125  Analgesia: Tylenol  Sedation: Precedex  Thromboprophylaxis: Held in setting of SDH and GIB  Ulcer prophylaxis: PPI  Glycemic control: BGM  Bowel care: Lactulose  Indwelling catheters: Ro   Lines: 2x 20 Ga IV, 1x 18 Ga IV, A-line    Code Status: Full Code      Bill Patterson MD  Critical Care

## 2024-04-15 ENCOUNTER — APPOINTMENT (OUTPATIENT)
Dept: GASTROENTEROLOGY | Facility: HOSPITAL | Age: 64
DRG: 308 | End: 2024-04-15
Payer: COMMERCIAL

## 2024-04-15 LAB
ALBUMIN SERPL BCP-MCNC: 2.6 G/DL (ref 3.4–5)
ALP SERPL-CCNC: 121 U/L (ref 33–136)
ALT SERPL W P-5'-P-CCNC: 38 U/L (ref 10–52)
ANION GAP SERPL CALC-SCNC: 11 MMOL/L (ref 10–20)
AST SERPL W P-5'-P-CCNC: 97 U/L (ref 9–39)
BASOPHILS # BLD AUTO: 0.04 X10*3/UL (ref 0–0.1)
BASOPHILS NFR BLD AUTO: 0.5 %
BILIRUB SERPL-MCNC: 4.9 MG/DL (ref 0–1.2)
BLOOD EXPIRATION DATE: NORMAL
BUN SERPL-MCNC: 8 MG/DL (ref 6–23)
CALCIUM SERPL-MCNC: 8.6 MG/DL (ref 8.6–10.3)
CHLORIDE SERPL-SCNC: 106 MMOL/L (ref 98–107)
CO2 SERPL-SCNC: 26 MMOL/L (ref 21–32)
CREAT SERPL-MCNC: 0.69 MG/DL (ref 0.5–1.3)
DISPENSE STATUS: NORMAL
EGFRCR SERPLBLD CKD-EPI 2021: >90 ML/MIN/1.73M*2
EOSINOPHIL # BLD AUTO: 0.37 X10*3/UL (ref 0–0.7)
EOSINOPHIL NFR BLD AUTO: 4.3 %
ERYTHROCYTE [DISTWIDTH] IN BLOOD BY AUTOMATED COUNT: 17.3 % (ref 11.5–14.5)
ERYTHROCYTE [DISTWIDTH] IN BLOOD BY AUTOMATED COUNT: 18.6 % (ref 11.5–14.5)
ERYTHROCYTE [DISTWIDTH] IN BLOOD BY AUTOMATED COUNT: 19 % (ref 11.5–14.5)
GLUCOSE BLD MANUAL STRIP-MCNC: 128 MG/DL (ref 74–99)
GLUCOSE SERPL-MCNC: 147 MG/DL (ref 74–99)
HCT VFR BLD AUTO: 26 % (ref 41–52)
HCT VFR BLD AUTO: 27 % (ref 41–52)
HCT VFR BLD AUTO: 27.4 % (ref 41–52)
HGB BLD-MCNC: 8.7 G/DL (ref 13.5–17.5)
HGB BLD-MCNC: 8.9 G/DL (ref 13.5–17.5)
HGB BLD-MCNC: 9.1 G/DL (ref 13.5–17.5)
HOLD SPECIMEN: NORMAL
IMM GRANULOCYTES # BLD AUTO: 0.2 X10*3/UL (ref 0–0.7)
IMM GRANULOCYTES NFR BLD AUTO: 2.3 % (ref 0–0.9)
INR PPP: 1.4 (ref 0.9–1.1)
INR PPP: 1.5 (ref 0.9–1.1)
LYMPHOCYTES # BLD AUTO: 0.85 X10*3/UL (ref 1.2–4.8)
LYMPHOCYTES NFR BLD AUTO: 9.9 %
MAGNESIUM SERPL-MCNC: 1.85 MG/DL (ref 1.6–2.4)
MCH RBC QN AUTO: 34 PG (ref 26–34)
MCH RBC QN AUTO: 34 PG (ref 26–34)
MCH RBC QN AUTO: 34.1 PG (ref 26–34)
MCHC RBC AUTO-ENTMCNC: 33 G/DL (ref 32–36)
MCHC RBC AUTO-ENTMCNC: 33.2 G/DL (ref 32–36)
MCHC RBC AUTO-ENTMCNC: 33.5 G/DL (ref 32–36)
MCV RBC AUTO: 102 FL (ref 80–100)
MCV RBC AUTO: 102 FL (ref 80–100)
MCV RBC AUTO: 103 FL (ref 80–100)
MONOCYTES # BLD AUTO: 0.97 X10*3/UL (ref 0.1–1)
MONOCYTES NFR BLD AUTO: 11.3 %
NEUTROPHILS # BLD AUTO: 6.12 X10*3/UL (ref 1.2–7.7)
NEUTROPHILS NFR BLD AUTO: 71.7 %
NRBC BLD-RTO: 0.6 /100 WBCS (ref 0–0)
NRBC BLD-RTO: 0.7 /100 WBCS (ref 0–0)
NRBC BLD-RTO: 0.7 /100 WBCS (ref 0–0)
PHOSPHATE SERPL-MCNC: 2.8 MG/DL (ref 2.5–4.9)
PLATELET # BLD AUTO: 85 X10*3/UL (ref 150–450)
PLATELET # BLD AUTO: 94 X10*3/UL (ref 150–450)
PLATELET # BLD AUTO: 96 X10*3/UL (ref 150–450)
POTASSIUM SERPL-SCNC: 3.7 MMOL/L (ref 3.5–5.3)
PRODUCT BLOOD TYPE: 7300
PRODUCT CODE: NORMAL
PROT SERPL-MCNC: 5.4 G/DL (ref 6.4–8.2)
PROTHROMBIN TIME: 16.1 SECONDS (ref 9.8–12.8)
PROTHROMBIN TIME: 16.4 SECONDS (ref 9.8–12.8)
RBC # BLD AUTO: 2.55 X10*6/UL (ref 4.5–5.9)
RBC # BLD AUTO: 2.62 X10*6/UL (ref 4.5–5.9)
RBC # BLD AUTO: 2.68 X10*6/UL (ref 4.5–5.9)
SODIUM SERPL-SCNC: 139 MMOL/L (ref 136–145)
UNIT ABO: NORMAL
UNIT NUMBER: NORMAL
UNIT RH: NORMAL
UNIT VOLUME: 253
UNIT VOLUME: 257
UNIT VOLUME: 350
WBC # BLD AUTO: 8.6 X10*3/UL (ref 4.4–11.3)
WBC # BLD AUTO: 8.6 X10*3/UL (ref 4.4–11.3)
WBC # BLD AUTO: 9.6 X10*3/UL (ref 4.4–11.3)
XM INTEP: NORMAL

## 2024-04-15 PROCEDURE — 36415 COLL VENOUS BLD VENIPUNCTURE: CPT

## 2024-04-15 PROCEDURE — 99231 SBSQ HOSP IP/OBS SF/LOW 25: CPT | Performed by: NURSE PRACTITIONER

## 2024-04-15 PROCEDURE — 2500000004 HC RX 250 GENERAL PHARMACY W/ HCPCS (ALT 636 FOR OP/ED)

## 2024-04-15 PROCEDURE — 2500000005 HC RX 250 GENERAL PHARMACY W/O HCPCS: Performed by: STUDENT IN AN ORGANIZED HEALTH CARE EDUCATION/TRAINING PROGRAM

## 2024-04-15 PROCEDURE — 85027 COMPLETE CBC AUTOMATED: CPT | Performed by: INTERNAL MEDICINE

## 2024-04-15 PROCEDURE — 85025 COMPLETE CBC W/AUTO DIFF WBC: CPT

## 2024-04-15 PROCEDURE — 2500000001 HC RX 250 WO HCPCS SELF ADMINISTERED DRUGS (ALT 637 FOR MEDICARE OP)

## 2024-04-15 PROCEDURE — 2500000001 HC RX 250 WO HCPCS SELF ADMINISTERED DRUGS (ALT 637 FOR MEDICARE OP): Performed by: STUDENT IN AN ORGANIZED HEALTH CARE EDUCATION/TRAINING PROGRAM

## 2024-04-15 PROCEDURE — 94003 VENT MGMT INPAT SUBQ DAY: CPT

## 2024-04-15 PROCEDURE — 2020000001 HC ICU ROOM DAILY

## 2024-04-15 PROCEDURE — 0DJ08ZZ INSPECTION OF UPPER INTESTINAL TRACT, VIA NATURAL OR ARTIFICIAL OPENING ENDOSCOPIC: ICD-10-PCS | Performed by: INTERNAL MEDICINE

## 2024-04-15 PROCEDURE — 2500000004 HC RX 250 GENERAL PHARMACY W/ HCPCS (ALT 636 FOR OP/ED): Mod: JZ | Performed by: INTERNAL MEDICINE

## 2024-04-15 PROCEDURE — 99231 SBSQ HOSP IP/OBS SF/LOW 25: CPT | Performed by: STUDENT IN AN ORGANIZED HEALTH CARE EDUCATION/TRAINING PROGRAM

## 2024-04-15 PROCEDURE — 80053 COMPREHEN METABOLIC PANEL: CPT

## 2024-04-15 PROCEDURE — 83735 ASSAY OF MAGNESIUM: CPT

## 2024-04-15 PROCEDURE — 2500000001 HC RX 250 WO HCPCS SELF ADMINISTERED DRUGS (ALT 637 FOR MEDICARE OP): Performed by: INTERNAL MEDICINE

## 2024-04-15 PROCEDURE — P9016 RBC LEUKOCYTES REDUCED: HCPCS

## 2024-04-15 PROCEDURE — 85610 PROTHROMBIN TIME: CPT

## 2024-04-15 PROCEDURE — 2500000002 HC RX 250 W HCPCS SELF ADMINISTERED DRUGS (ALT 637 FOR MEDICARE OP, ALT 636 FOR OP/ED): Performed by: STUDENT IN AN ORGANIZED HEALTH CARE EDUCATION/TRAINING PROGRAM

## 2024-04-15 PROCEDURE — 2500000004 HC RX 250 GENERAL PHARMACY W/ HCPCS (ALT 636 FOR OP/ED): Performed by: STUDENT IN AN ORGANIZED HEALTH CARE EDUCATION/TRAINING PROGRAM

## 2024-04-15 PROCEDURE — 43235 EGD DIAGNOSTIC BRUSH WASH: CPT | Performed by: INTERNAL MEDICINE

## 2024-04-15 PROCEDURE — 43235 EGD DIAGNOSTIC BRUSH WASH: CPT

## 2024-04-15 PROCEDURE — 2500000005 HC RX 250 GENERAL PHARMACY W/O HCPCS: Performed by: INTERNAL MEDICINE

## 2024-04-15 PROCEDURE — 99232 SBSQ HOSP IP/OBS MODERATE 35: CPT | Performed by: INTERNAL MEDICINE

## 2024-04-15 PROCEDURE — 82947 ASSAY GLUCOSE BLOOD QUANT: CPT

## 2024-04-15 PROCEDURE — 36430 TRANSFUSION BLD/BLD COMPNT: CPT

## 2024-04-15 PROCEDURE — 84100 ASSAY OF PHOSPHORUS: CPT

## 2024-04-15 PROCEDURE — C9113 INJ PANTOPRAZOLE SODIUM, VIA: HCPCS

## 2024-04-15 PROCEDURE — 85027 COMPLETE CBC AUTOMATED: CPT

## 2024-04-15 RX ORDER — POTASSIUM CHLORIDE 1.5 G/1.58G
40 POWDER, FOR SOLUTION ORAL ONCE
Status: COMPLETED | OUTPATIENT
Start: 2024-04-15 | End: 2024-04-15

## 2024-04-15 RX ORDER — METOPROLOL TARTRATE 25 MG/1
25 TABLET, FILM COATED ORAL 2 TIMES DAILY
Status: DISCONTINUED | OUTPATIENT
Start: 2024-04-15 | End: 2024-04-19

## 2024-04-15 RX ORDER — PROPOFOL 10 MG/ML
5-20 INJECTION, EMULSION INTRAVENOUS CONTINUOUS
Status: DISCONTINUED | OUTPATIENT
Start: 2024-04-15 | End: 2024-04-15

## 2024-04-15 RX ORDER — PROPOFOL 10 MG/ML
INJECTION, EMULSION INTRAVENOUS
Status: COMPLETED
Start: 2024-04-15 | End: 2024-04-15

## 2024-04-15 RX ORDER — MAGNESIUM SULFATE HEPTAHYDRATE 40 MG/ML
2 INJECTION, SOLUTION INTRAVENOUS ONCE
Status: COMPLETED | OUTPATIENT
Start: 2024-04-15 | End: 2024-04-15

## 2024-04-15 RX ADMIN — CEFEPIME 500 MG: 1 INJECTION, POWDER, FOR SOLUTION INTRAMUSCULAR; INTRAVENOUS at 09:06

## 2024-04-15 RX ADMIN — VANCOMYCIN HYDROCHLORIDE 1250 MG: 1.25 INJECTION, POWDER, LYOPHILIZED, FOR SOLUTION INTRAVENOUS at 15:37

## 2024-04-15 RX ADMIN — VANCOMYCIN HYDROCHLORIDE 1250 MG: 1.25 INJECTION, POWDER, LYOPHILIZED, FOR SOLUTION INTRAVENOUS at 02:24

## 2024-04-15 RX ADMIN — Medication: at 20:00

## 2024-04-15 RX ADMIN — POTASSIUM CHLORIDE 40 MEQ: 1.5 POWDER, FOR SOLUTION ORAL at 09:07

## 2024-04-15 RX ADMIN — OCTREOTIDE ACETATE 50 MCG/HR: 1000 INJECTION, SOLUTION INTRAVENOUS; SUBCUTANEOUS at 17:52

## 2024-04-15 RX ADMIN — CEFEPIME 500 MG: 1 INJECTION, POWDER, FOR SOLUTION INTRAMUSCULAR; INTRAVENOUS at 17:31

## 2024-04-15 RX ADMIN — METOPROLOL TARTRATE 25 MG: 25 TABLET, FILM COATED ORAL at 10:09

## 2024-04-15 RX ADMIN — THIAMINE HYDROCHLORIDE 200 MG: 100 INJECTION, SOLUTION INTRAMUSCULAR; INTRAVENOUS at 15:37

## 2024-04-15 RX ADMIN — THIAMINE HYDROCHLORIDE 200 MG: 100 INJECTION, SOLUTION INTRAMUSCULAR; INTRAVENOUS at 09:06

## 2024-04-15 RX ADMIN — SODIUM CHLORIDE, SODIUM LACTATE, POTASSIUM CHLORIDE, CALCIUM CHLORIDE AND DEXTROSE MONOHYDRATE 125 ML/HR: 5; 600; 310; 30; 20 INJECTION, SOLUTION INTRAVENOUS at 04:39

## 2024-04-15 RX ADMIN — Medication 1 TABLET: at 09:07

## 2024-04-15 RX ADMIN — CEFEPIME 500 MG: 1 INJECTION, POWDER, FOR SOLUTION INTRAMUSCULAR; INTRAVENOUS at 00:50

## 2024-04-15 RX ADMIN — GLUCAGON 1 MG: KIT at 14:09

## 2024-04-15 RX ADMIN — LEVETIRACETAM 500 MG: 5 INJECTION INTRAVENOUS at 12:30

## 2024-04-15 RX ADMIN — LEVETIRACETAM 500 MG: 5 INJECTION INTRAVENOUS at 01:42

## 2024-04-15 RX ADMIN — THIAMINE HYDROCHLORIDE 200 MG: 100 INJECTION, SOLUTION INTRAMUSCULAR; INTRAVENOUS at 20:20

## 2024-04-15 RX ADMIN — MAGNESIUM SULFATE HEPTAHYDRATE 2 G: 40 INJECTION, SOLUTION INTRAVENOUS at 09:07

## 2024-04-15 RX ADMIN — PROPOFOL 5 MCG/KG/MIN: 10 INJECTION, EMULSION INTRAVENOUS at 13:51

## 2024-04-15 RX ADMIN — LACTULOSE 20 G: 20 SOLUTION ORAL at 09:07

## 2024-04-15 RX ADMIN — FOLIC ACID 1 MG: 1 TABLET ORAL at 09:07

## 2024-04-15 RX ADMIN — TAMSULOSIN HYDROCHLORIDE 0.4 MG: 0.4 CAPSULE ORAL at 09:07

## 2024-04-15 RX ADMIN — OCTREOTIDE ACETATE 50 MCG/HR: 1000 INJECTION, SOLUTION INTRAVENOUS; SUBCUTANEOUS at 09:06

## 2024-04-15 RX ADMIN — PANTOPRAZOLE SODIUM 40 MG: 40 INJECTION, POWDER, FOR SOLUTION INTRAVENOUS at 12:30

## 2024-04-15 RX ADMIN — PANTOPRAZOLE SODIUM 40 MG: 40 INJECTION, POWDER, FOR SOLUTION INTRAVENOUS at 00:49

## 2024-04-15 RX ADMIN — Medication: at 08:00

## 2024-04-15 ASSESSMENT — PAIN - FUNCTIONAL ASSESSMENT
PAIN_FUNCTIONAL_ASSESSMENT: 0-10

## 2024-04-15 ASSESSMENT — PAIN SCALES - GENERAL
PAINLEVEL_OUTOF10: 4
PAINLEVEL_OUTOF10: 0 - NO PAIN

## 2024-04-15 NOTE — PROGRESS NOTES
Cardiology Progress    Impression:  ETOH intoxication  NSVT in the setting of hypokalemia.  No recurrence.  P. Afib/flutter. CHADVASC 2.  Now sinus rhythm.  History of GI bleeding.  Anemia.  AV block  s/p PPM  HTN  DLP  Neuropathy  Plan:  Resume beta-blockers  No plans for anticoagulation  HPI:  Intubated.  Awake.  Endoscopy planned for later today.  Telemetry shows sinus rhythm with ventricular paced  Meds:  Scheduled medications  [Held by provider] allopurinol, 300 mg, oral, Daily  cefepime, 500 mg, intravenous, q8h  folic acid, 1 mg, oral, Daily  lactulose, 20 g, oral, TID  levETIRAcetam, 500 mg, intravenous, q12h  magnesium sulfate, 2 g, intravenous, Once  multivitamin with minerals, 1 tablet, oral, Daily  oxygen, , inhalation, Continuous - Inhalation  pantoprazole, 40 mg, intravenous, q12h  tamsulosin, 0.4 mg, oral, Daily  thiamine, 200 mg, intravenous, TID  vancomycin, 1,250 mg, intravenous, q12h      Continuous medications  dexmedeTOMIDine, 0.1-1.5 mcg/kg/hr, Last Rate: Stopped (04/14/24 2000)  dextrose 5 % and lactated Ringer's, 125 mL/hr, Last Rate: 125 mL/hr (04/15/24 0659)  fentaNYL, 25 mcg/hr, Last Rate: 25 mcg/hr (04/15/24 0659)  octreotide, 50 mcg/hr, Last Rate: 50 mcg/hr (04/15/24 0906)      PRN medications  PRN medications: acetaminophen, vancomycin    Physical exam:  Vitals:    04/15/24 0600   BP: 122/71   Pulse: 97   Resp: 18   Temp:    SpO2: 95%      JVP not visible.  Decreased breath sounds bilaterally.  No edema.  EKG:  Telemetry shows sinus rhythm with ventricular pacing.  Echo:  4/12/2024: Normal EF.  Labs:  Lab Results   Component Value Date    WBC 8.6 04/15/2024    HGB 8.7 (L) 04/15/2024    HCT 26.0 (L) 04/15/2024    PLT 85 (L) 04/15/2024    CHOL 179 09/24/2021    TRIG 138 09/24/2021    HDL 47.9 09/24/2021    ALT 38 04/15/2024    AST 97 (H) 04/15/2024     04/15/2024    K 3.7 04/15/2024     04/15/2024    CREATININE 0.69 04/15/2024    BUN 8 04/15/2024    CO2 26 04/15/2024    TSH  2.91 09/24/2021    INR 1.5 (H) 04/15/2024     par

## 2024-04-15 NOTE — PROGRESS NOTES
Rounded with ICU team.  Patient remains intubated, awake.  Family at bedside.  Patient is following commands.  Planning to extubate after EGD this afternoon.  Care Transitions will continue to follow for discharge planning.  Asuncion Castellon RN BSN, TCC

## 2024-04-15 NOTE — PROGRESS NOTES
"Critical Care Progress Note    Patient Name: Trey Borjas   YOB: 1960    Subjective:  Patient intubated, alert and follows commands. Communicates by writing on pad. Endorses 6/10 abdomen pain earlier in the morning that since resolved. Patient had one \"cherry-red\" bowel movement last night. No other signs of bleeding.    Objective:    /71   Pulse 97   Temp 36.6 °C (97.9 °F) (Temporal)   Resp 19   Ht 1.829 m (6' 0.01\")   Wt 98.6 kg (217 lb 6 oz)   SpO2 95%   BMI 29.47 kg/m²     Physical Exam:  GEN: intubate, responds to name, follows commands  HEENT: eyes open to voice, ET tube in place  NECK: supple, no cervical LAD, no carotid bruits  CV: not tachy  PULM: mechanical ventilation  ABD: soft, NT, ND, BS+  EXT: no LE edema  NEURO: no gross focal deficits    Assessment/Plan:  Trey Borjas is a 64yo man with past medical history of HTN, gout, neuropathy, alcohol abuse, smoking, Afib, complete heart block s/p pacemaker who was initially admitted on 4/10/23 for aflutter and monitoring for alcohol withdrawal. Code Blue was called on 4/12/24 with patient developing pVT, CPR was initiated, patient obtained ROSC within minutes of code without defibrillation. Patient was intubated during this time. Patient transferred to ICU for post-arrest care. CTH showed subdural hemorrhage in the posterior right cerebral hemisphere, on-call neurosurgeon Dr. Sahu reviewed images and recommended no role for surgery at this time. Patient also reported having blood via OG tube and PPI and octreotide gtt were initiated. Vitamin K and FFP given as INR> 1.5.    Neuro:  #SDH  #Possible Hepatic Encephalopathy  #Possible Wernicke Encephalopathy  #Metabolic Encephalopathy secondary to arrest  - Neurosurgery following, no surgical intervention at this time, CTH showed stable SDH Goal INR < 1.6 Plt > 75 Keppra 500mg BID  - Maintain SBP < 140  - On fentanyl gtt with plans for EGD today  - IV thiamine for possible " wernicke, continue multivitamin, and folic acid  - Continue lactulose    Cardiovascular  #S/p Cardiac Arrest (pVtach)  #Aflutter  - Maintain K>4 and Mg > 2  - Cardiology following, echo showed EF 60-65%, RVSP normal limits, resumed metoprolol tartrate 25mg BID  - Patient off levo gtt, will continue with IVF D5LR 125 ml/he    Pulmonary:  #Respiratory failure s/p cardiac arrest  - Patient intubated 4/12, plan for extubation after EGD today    GI:  #Concern for UGIB  #Hepatic Cirrhosis  #Transaminitis - suspect secondary to alcoholic hepatitis   #Hyperbilirubinemia  - 40mg Protonix BID  - Octreotide gtt   - GI following, continue octreotide for 72 hours, plan for possible EGD today  - EGD 11/2022 showed no varices or bleeding ulcers  - Continue with lactulose  - Patient transfused 1 unit pRBC 4/15 with improvement of hemoglobin (6.9 -> 8.7)    Renal:   #Lactic acidosis - resolved  - Monitor urine output  - K >4 Mg >2    Endocrine:  #T2DM  A1c 8.5  - Goal blood glucose 140-180    Heme/Onc:  #Concern for blood loss anemia  #Elevated INR  #Thrombocytopenia  - Transfuse to maintain goal Plt > 75 and INR < 1.6  - Maintain T&S  - Platelets stable above goal, transfused 1 unit 4/14    ID:  #Possible CAP  CT Chest 4/12 GGO in [facility] and [facility]  - Broad spectrum antibiotics with Vanc and Cefepime   - Follow-up cultures and PNA work-up; MRSA negative    ICU CHECK LIST:   Antimicrobials: Vanc, Cefepime (day 3)  Oxygen: Mechanically ventilated  Feeding: NPO in setting of UGIB  Fluids: D5LR 125  Analgesia: Tylenol  Sedation: Precedex  Thromboprophylaxis: Held in setting of SDH and GIB  Ulcer prophylaxis: PPI  Glycemic control: BGM  Bowel care: Lactulose  Indwelling catheters: Ro   Lines: 2x 20 Ga IV, 1x 18 Ga IV    Code Status: Full Code      Bill Patterson MD  Critical Care       CRITICAL CARE MEDICINE ATTENDING NOTE    I saw and evaluated the patient. I personally obtained the key and critical portions of the history and physical  examination. I reviewed the resident's documentation and discussed the patient with the resident. I agree with the resident's medical decision making as documented in the resident's note.    The patient has high probability of sudden, clinically significant deterioration, which requires urgent interventions. I managed/supervised life supporting interventions that required frequent physician assessment. I spent 40 critical care minutes of my full attention on this patient's management and direct patient care. Time I spent with family or surrogate(s) is included if the patient was incapable of providing information or participating in medical decision making. Time devoted to teaching and to any procedures I billed separately is not included. Medical issues requiring critical care management include:    Haydee Hudson MD  Pulmonary and Critical Care Medicine

## 2024-04-15 NOTE — CARE PLAN
Problem: Pain  Goal: My pain/discomfort is manageable  Outcome: Progressing     Problem: Safety  Goal: Patient will be injury free during hospitalization  Outcome: Progressing  Goal: I will remain free of falls  Outcome: Progressing     Problem: Daily Care  Goal: Daily care needs are met  Outcome: Progressing     Problem: Psychosocial Needs  Goal: Demonstrates ability to cope with hospitalization/illness  Outcome: Progressing  Goal: Collaborate with me, my family, and caregiver to identify my specific goals  Outcome: Progressing     Problem: Discharge Barriers  Goal: My discharge needs are met  Outcome: Progressing     Problem: ACS/CP/NSTEMI/STEMI  Goal: Chest pain managed (free from pain or at acceptable level)  Outcome: Progressing  Goal: Lab values return to normal range  Outcome: Progressing  Goal: Promote self management  Outcome: Progressing  Goal: Serial ECG will return to baseline  Outcome: Progressing  Goal: Verbalize understanding of procedures/devices  Outcome: Progressing  Goal: Wean vasopressors/achieve hemodynamic stability  Outcome: Progressing     Problem: Arrythmia/Dysrhythmia  Goal: Lab values return to normal range  Outcome: Progressing  Goal: No evidence of post procedure complications  Outcome: Progressing  Goal: Promote self management  Outcome: Progressing  Goal: Serial ECG will return to baseline  Outcome: Progressing  Goal: Verbalize understanding of procedures/devices  Outcome: Progressing  Goal: Vital signs return to baseline  Outcome: Progressing  Goal: Care and maintenance of device (specify)  Outcome: Progressing     Problem: Cardiac catheterization  Goal: Free from dysrhythmias  Outcome: Progressing  Goal: Free from pain  Outcome: Progressing  Goal: No evidence of post procedure complications  Outcome: Progressing  Goal: Promote self management  Outcome: Progressing  Goal: Verbalize understanding of procedure  Outcome: Progressing  Goal: Care and maintenance of device  (specify)  Outcome: Progressing     Problem: Hypertensive Emergency/Crisis  Goal: Blood pressure gradually reduced to goal range  Outcome: Progressing  Goal: Free from signs of organ damage  Outcome: Progressing  Goal: Lab values return to normal range  Outcome: Progressing  Goal: Promote self management  Outcome: Progressing     Problem: Respiratory  Goal: Clear secretions with interventions this shift  Outcome: Progressing  Goal: Minimize anxiety/maximize coping throughout shift  Outcome: Progressing  Goal: Minimal/no exertional discomfort or dyspnea this shift  Outcome: Progressing  Goal: No signs of respiratory distress (eg. Use of accessory muscles. Peds grunting)  Outcome: Progressing  Goal: Patent airway maintained this shift  Outcome: Progressing  Goal: Tolerate mechanical ventilation evidenced by VS/agitation level this shift  Outcome: Progressing  Goal: Tolerate pulmonary toileting this shift  Outcome: Progressing  Goal: Verbalize decreased shortness of breath this shift  Outcome: Progressing  Goal: Wean oxygen to maintain O2 saturation per order/standard this shift  Outcome: Progressing  Goal: Increase self care and/or family involvement in next 24 hours  Outcome: Progressing     Problem: Safety - Medical Restraint  Goal: Remains free of injury from restraints (Restraint for Interference with Medical Device)  Outcome: Progressing  Goal: Free from restraint(s) (Restraint for Interference with Medical Device)  Outcome: Progressing     Problem: Pain - Adult  Goal: Verbalizes/displays adequate comfort level or baseline comfort level  Outcome: Progressing     Problem: Safety - Adult  Goal: Free from fall injury  Outcome: Progressing     Problem: Discharge Planning  Goal: Discharge to home or other facility with appropriate resources  Outcome: Progressing     Problem: Chronic Conditions and Co-morbidities  Goal: Patient's chronic conditions and co-morbidity symptoms are monitored and maintained or  improved  Outcome: Progressing     Problem: Skin  Goal: Decreased wound size/increased tissue granulation at next dressing change  Outcome: Progressing  Flowsheets (Taken 4/15/2024 0847)  Decreased wound size/increased tissue granulation at next dressing change: Protective dressings over bony prominences  Goal: Participates in plan/prevention/treatment measures  Outcome: Progressing  Flowsheets (Taken 4/15/2024 0847)  Participates in plan/prevention/treatment measures: Discuss with provider PT/OT consult  Goal: Prevent/manage excess moisture  Outcome: Progressing  Flowsheets (Taken 4/15/2024 0847)  Prevent/manage excess moisture: Cleanse incontinence/protect with barrier cream  Goal: Prevent/minimize sheer/friction injuries  Outcome: Progressing  Flowsheets (Taken 4/15/2024 0847)  Prevent/minimize sheer/friction injuries: Turn/reposition every 2 hours/use positioning/transfer devices  Goal: Promote/optimize nutrition  Outcome: Progressing  Flowsheets (Taken 4/15/2024 0847)  Promote/optimize nutrition: Monitor/record intake including meals  Goal: Promote skin healing  Outcome: Progressing  Flowsheets (Taken 4/15/2024 0847)  Promote skin healing: Turn/reposition every 2 hours/use positioning/transfer devices     Problem: Fall/Injury  Goal: Not fall by end of shift  Outcome: Progressing  Goal: Be free from injury by end of the shift  Outcome: Progressing  Goal: Verbalize understanding of personal risk factors for fall in the hospital  Outcome: Progressing  Goal: Verbalize understanding of risk factor reduction measures to prevent injury from fall in the home  Outcome: Progressing  Goal: Use assistive devices by end of the shift  Outcome: Progressing  Goal: Pace activities to prevent fatigue by end of the shift  Outcome: Progressing     Problem: Pain  Goal: Takes deep breaths with improved pain control throughout the shift  Outcome: Progressing  Goal: Turns in bed with improved pain control throughout the  shift  Outcome: Progressing  Goal: Walks with improved pain control throughout the shift  Outcome: Progressing  Goal: Performs ADL's with improved pain control throughout shift  Outcome: Progressing  Goal: Participates in PT with improved pain control throughout the shift  Outcome: Progressing  Goal: Free from opioid side effects throughout the shift  Outcome: Progressing  Goal: Free from acute confusion related to pain meds throughout the shift  Outcome: Progressing   The patient's goals for the shift include      The clinical goals for the shift include pt will remain hemodynamically stable

## 2024-04-15 NOTE — PROGRESS NOTES
Physical Therapy                        Therapy Communication Note    Patient Name: Trey Borjas  MRN: [id number]  Room: [address] [place] [address]  Today's Date: 04/15/24    Discipline: Physical Therapy    Missed Visit Reason:  (Pt placed on medical hold; per RN EGD scheduled at 1300 d/t pt having bloody stools overnight and concern for bleeding, possible extubation this afternoon.)    Missed Time: Attempt

## 2024-04-15 NOTE — CONSULTS
"Nutrition Initial Assessment:   Nutrition Assessment    Reason for Assessment: Provider consult order, Tube feeding assessment and order    Patient is a 64 y.o. male currently intubated, no propofol running. Plan is for EGD today, then extubation. Messaged resident regarding plans for TF - no need for order to be placed at this time, but will place recommendations in this note should pt require enteral nutrition (see recommendations section).       Nutrition History:  Energy Intake: Poor < 50 %  Food and Nutrient History: Pt NPO for EGD. Had 66% intake of one meal before being intubated. Unknown diet hx from prior to admission, other than reports of EtOH abuse. Unable to assess for presence of GI symptoms or difficulty chewing/swallowing.  Vitamin/Herbal Supplement Use: folic acid, multivitamin, thiamine per home med list  Food Allergies/Intolerances:  None  GI Symptoms:  KLAUDIA  Oral Problems:  KLAUDIA       Anthropometrics:  Height: 182.9 cm (6' 0.01\")   Weight: 98.6 kg (217 lb 6 oz)   BMI (Calculated): 29.47  IBW/kg (Dietitian Calculated): 80.9 kg          Weight History:   Wt Readings from Last 10 Encounters:   04/15/24 98.6 kg (217 lb 6 oz)   03/23/24 95.7 kg (211 lb)   10/26/23 101 kg (222 lb 3.2 oz)   09/28/23 100 kg (221 lb 3.2 oz)   07/27/23 103 kg (227 lb 6.4 oz)   05/01/23 108 kg (237 lb)   01/17/23 99.3 kg (219 lb)   01/13/23 98.4 kg (217 lb)   12/13/22 97.5 kg (215 lb)   09/29/22 97.5 kg (215 lb)      Weight Change %:  Weight History / % Weight Change: No evidence of significant wt loss based on available wt records.  Significant Weight Loss: No    Nutrition Focused Physical Exam Findings:  defer: constraints of critical care  Subcutaneous Fat Loss:   Orbital Fat Pads: Defer  Muscle Wasting:  Temporalis: Defer  Edema:  Edema: none  Physical Findings:  Skin: Positive (right elbow skin tear, per nursing assessment)    Nutrition Significant Labs:    Reviewed   Nutrition Specific Medications:  Reviewed     I/O: "   Last BM Date: 04/15/24; Stool Appearance: Tarry (04/15/24 0600)    Dietary Orders (From admission, onward)       Start     Ordered    04/12/24 2136  NPO Diet; Effective now  Diet effective now         04/12/24 2137                     Estimated Needs:   Total Energy Estimated Needs (kCal): 1800 kCal  Method for Estimating Needs: 3716-3048 kcal/d (% of PSU)  Total Protein Estimated Needs (g): 141 g  Method for Estimating Needs: 121-161 g/d (1.5-2 g/kg IBW)  Total Fluid Estimated Needs (mL): 1600 mL  Method for Estimating Needs: 1 ml/kcal or per MD        Nutrition Diagnosis   Malnutrition Diagnosis  Patient has Malnutrition Diagnosis:  (unable to determine at this time)    Nutrition Diagnosis  Patient has Nutrition Diagnosis: Yes  Diagnosis Status (1): New  Nutrition Diagnosis 1: Inadequate protein energy intake  Related to (1): mechanical ventilation, GI issues, hospital course  As Evidenced by (1): NPO and no nutrition support running       Nutrition Interventions/Recommendations         Nutrition Prescription:  Individualized Nutrition Prescription Provided for : Advance diet as medically appropriate after extubating. See below for TF recs if unable to extubate.        Nutrition Interventions:   Interventions: Meals and snacks, Enteral intake, Vitamin supplement therapy  Meals and Snacks: General healthful diet  Goal: Consumes 3 meals per day  Enteral Intake: Modify composition of enteral nutrition, Modify rate of enteral nutrition  Goal: If unable to extubate, start TF Vital HP, goal rate of 65 ml/hr continuous via NG/OG (provides 1560 kcal, 136 g protein, and 1304 ml free H2O). 50 ml flushes q6h or per MD.  Vitamin Supplement Therapy: Folate, Thiamin, Other (Comment) (multivitamin)  Goal: Continue daily vitamin intake  Additional Interventions: ONS if intake is poor after diet is advanced    Collaboration and Referral of Nutrition Care: Collaboration by nutrition professional with other providers  Goal:  Secure message to resident concerning consult for TF    Nutrition Education:   N/A - intubated       Nutrition Monitoring and Evaluation   Food/Nutrient Related History Monitoring  Monitoring and Evaluation Plan: Energy intake, Amount of food, Fluid intake, Vitamin intake  Energy Intake: Estimated energy intake  Criteria: Pt meets >75% of estimated energy needs  Fluid Intake: Estimated fluid intake  Criteria: Meets >75% of estimated fluid needs  Amount of Food: Estimated amout of food  Criteria: Pt consumes >75% of meal  Vitamin Intake: Measured vitamin intake  Criteria: Pt consumes prescribed vitamins daily  Additional Plans: Initiation of EN if not extubated and started on diet    Body Composition/Growth/Weight History  Monitoring and Evaluation Plan: Weight  Weight: Measured weight  Criteria: Maintains stable weight    Biochemical Data, Medical Tests and Procedures  Monitoring and Evaluation Plan: Glucose/endocrine profile, Electrolyte/renal panel  Electrolyte and Renal Panel: Sodium, Potassium, Phosphorus  Criteria: Electrolytes WNL  Glucose/Endocrine Profile: Glucose, casual  Criteria: BG within desirable range            Time Spent/Follow-up Reminder:   Time Spent (min): 45 minutes  Last Date of Nutrition Visit: 04/15/24  Nutrition Follow-Up Needed?: 3-5 days  Follow up Comment: 4/19/24 GLADYS

## 2024-04-15 NOTE — PROGRESS NOTES
"Trey Borjas is a 64 y.o. male on day 3 of admission presenting with Atrial flutter, unspecified type (Multi).    Subjective   Patient still intubated not able to give a history.  The NG tube occasionally showed some blackish material       Objective     Physical Exam patient is intubated under mild sedation opens his eyes no tachycardia abdomen is not bloated no tenderness noted    Last Recorded Vitals  Blood pressure 129/73, pulse 80, temperature 36.8 °C (98.2 °F), temperature source Temporal, resp. rate 12, height 1.829 m (6' 0.01\"), weight 98.6 kg (217 lb 6 oz), SpO2 95%.  Intake/Output last 3 Shifts:  I/O last 3 completed shifts:  In: 3670.8 (37.2 mL/kg) [I.V.:1380.8 (14 mL/kg); Blood:1100; NG/GT:240; IV Piggyback:950]  Out: 2280 (23.1 mL/kg) [Urine:1480 (0.4 mL/kg/hr); Emesis/NG output:800]  Weight: 98.6 kg     Relevant Results  Results for orders placed or performed during the hospital encounter of 04/10/24 (from the past 24 hour(s))   CBC   Result Value Ref Range    WBC 8.3 4.4 - 11.3 x10*3/uL    nRBC 0.4 (H) 0.0 - 0.0 /100 WBCs    RBC 2.08 (L) 4.50 - 5.90 x10*6/uL    Hemoglobin 6.9 (L) 13.5 - 17.5 g/dL    Hematocrit 21.6 (L) 41.0 - 52.0 %     (H) 80 - 100 fL    MCH 33.2 26.0 - 34.0 pg    MCHC 31.9 (L) 32.0 - 36.0 g/dL    RDW 16.7 (H) 11.5 - 14.5 %    Platelets 80 (L) 150 - 450 x10*3/uL   POCT GLUCOSE   Result Value Ref Range    POCT Glucose 141 (H) 74 - 99 mg/dL   Prepare RBC: 1 Units   Result Value Ref Range    PRODUCT CODE W2378Y62     Unit Number [id number]     Unit ABO B     Unit RH POS     XM INTEP COMP     Dispense Status TR     Blood Expiration Date April 30, 2024 23:59 EDT     PRODUCT BLOOD TYPE 7300     UNIT VOLUME 350    CBC and Auto Differential   Result Value Ref Range    WBC 8.6 4.4 - 11.3 x10*3/uL    nRBC 0.7 (H) 0.0 - 0.0 /100 WBCs    RBC 2.55 (L) 4.50 - 5.90 x10*6/uL    Hemoglobin 8.7 (L) 13.5 - 17.5 g/dL    Hematocrit 26.0 (L) 41.0 - 52.0 %     (H) 80 - 100 fL    MCH " 34.1 (H) 26.0 - 34.0 pg    MCHC 33.5 32.0 - 36.0 g/dL    RDW 17.3 (H) 11.5 - 14.5 %    Platelets 85 (L) 150 - 450 x10*3/uL    Neutrophils % 71.7 40.0 - 80.0 %    Immature Granulocytes %, Automated 2.3 (H) 0.0 - 0.9 %    Lymphocytes % 9.9 13.0 - 44.0 %    Monocytes % 11.3 2.0 - 10.0 %    Eosinophils % 4.3 0.0 - 6.0 %    Basophils % 0.5 0.0 - 2.0 %    Neutrophils Absolute 6.12 1.20 - 7.70 x10*3/uL    Immature Granulocytes Absolute, Automated 0.20 0.00 - 0.70 x10*3/uL    Lymphocytes Absolute 0.85 (L) 1.20 - 4.80 x10*3/uL    Monocytes Absolute 0.97 0.10 - 1.00 x10*3/uL    Eosinophils Absolute 0.37 0.00 - 0.70 x10*3/uL    Basophils Absolute 0.04 0.00 - 0.10 x10*3/uL   Comprehensive Metabolic Panel   Result Value Ref Range    Glucose 147 (H) 74 - 99 mg/dL    Sodium 139 136 - 145 mmol/L    Potassium 3.7 3.5 - 5.3 mmol/L    Chloride 106 98 - 107 mmol/L    Bicarbonate 26 21 - 32 mmol/L    Anion Gap 11 10 - 20 mmol/L    Urea Nitrogen 8 6 - 23 mg/dL    Creatinine 0.69 0.50 - 1.30 mg/dL    eGFR >90 >60 mL/min/1.73m*2    Calcium 8.6 8.6 - 10.3 mg/dL    Albumin 2.6 (L) 3.4 - 5.0 g/dL    Alkaline Phosphatase 121 33 - 136 U/L    Total Protein 5.4 (L) 6.4 - 8.2 g/dL    AST 97 (H) 9 - 39 U/L    Bilirubin, Total 4.9 (H) 0.0 - 1.2 mg/dL    ALT 38 10 - 52 U/L   Magnesium   Result Value Ref Range    Magnesium 1.85 1.60 - 2.40 mg/dL   Protime-INR   Result Value Ref Range    Protime 16.4 (H) 9.8 - 12.8 seconds    INR 1.5 (H) 0.9 - 1.1   Phosphorus   Result Value Ref Range    Phosphorus 2.8 2.5 - 4.9 mg/dL   CBC   Result Value Ref Range    WBC 9.6 4.4 - 11.3 x10*3/uL    nRBC 0.7 (H) 0.0 - 0.0 /100 WBCs    RBC 2.62 (L) 4.50 - 5.90 x10*6/uL    Hemoglobin 8.9 (L) 13.5 - 17.5 g/dL    Hematocrit 27.0 (L) 41.0 - 52.0 %     (H) 80 - 100 fL    MCH 34.0 26.0 - 34.0 pg    MCHC 33.0 32.0 - 36.0 g/dL    RDW 18.6 (H) 11.5 - 14.5 %    Platelets 96 (L) 150 - 450 x10*3/uL   POCT GLUCOSE   Result Value Ref Range    POCT Glucose 128 (H) 74 - 99 mg/dL    I proceeded to do an EGD on this patient    EGD    Result Date: 4/15/2024  Table formatting from the original result was not included. Impression: noactive bleeding. 1+ single esophageal varix and severe gastritis. The cricopharynx, upper third of the esophagus and middle third of the esophagus appeared normal. One small, minimal and tubular varix in the esophagus Moderate abnormal mucosa, consistent with gastritis. Some suctin marks from n/g suction. Glucagon administred for complete relaxation and no ulcers seen. No active bleeding anywhere. The duodenal bulb, 1st part of the duodenum and 2nd part of the duodenum appeared normal. Findings The cricopharynx, upper third of the esophagus and middle third of the esophagus appeared normal. One small, minimal and tubular varix in the esophagus; no bleeding was identified Moderate, generalized edematous, erythematous and hemorrhagic mucosa with erosion, consistent with gastritis; no bleeding was identified; The duodenal bulb, 1st part of the duodenum and 2nd part of the duodenum appeared normal. Recommendation  Follow up with PCP Indication Esophagitis, Hematemesis, unspecified whether nausea present Staff Staff Role No Staff Documented Medications See Anesthesia Record. Preprocedure A history and physical has been performed, and patient medication allergies have been reviewed. The patient's tolerance of previous anesthesia has been reviewed. The risks and benefits of the procedure and the sedation options and risks were discussed with the patient. All questions were answered and informed consent obtained. Details of the Procedure The patient underwent moderate sedation, which was administered by a sedation nurse and an anesthesia professional. The patient's blood pressure, ECG, ETCO2, heart rate, level of consciousness and oxygen were monitored throughout the procedure. The scope was introduced through the mouth and advanced to the second part of the duodenum.  Retroflexion was performed in the cardia. Prior to the procedure, the patient's H. Pylori status was unknown. The patient experienced no blood loss. The procedure was not difficult. The patient tolerated the procedure well. There were no apparent adverse events. Events Procedure Events Event Event Time Specimens * Cannot find log * Procedure Location [facility] [address] [facility] [address] [phone] Referring Provider No referring provider defined for this encounter. Procedure Provider No name on file     ECG 12 lead    Result Date: 4/15/2024  Wide QRS rhythm Nonspecific intraventricular conduction delay Marked ST abnormality, possible anterior subendocardial injury Abnormal ECG When compared with ECG of 10-APR-2024 12:43, PREVIOUS ECG IS PRESENT    ECG 12 lead    Result Date: 4/15/2024  Ventricular-paced rhythm with intrinsic complexes Abnormal ECG No previous ECGs available    Electrocardiogram, 12-lead PRN ACS symptoms    Result Date: 4/14/2024  Ventricular-paced complexes See ED provider note for full interpretation and clinical correlation Confirmed by Shyanne Barnett ([id number]) on 4/14/2024 11:10:09 AM    CT head wo IV contrast    Result Date: 4/14/2024  Interpreted By:  Jemma Landis, STUDY: CT HEAD WO IV CONTRAST;  4/14/2024 4:21 am   INDICATION: Signs/Symptoms:SDH, interval scan for progression.   COMPARISON: April 13, 2024   ACCESSION NUMBER(S): [id number]   ORDERING CLINICIAN: OKSANA PANIAGUA   TECHNIQUE: Noncontrast axial CT scan of head was performed. Angled reformats in brain and bone windows were generated. The images were reviewed in bone, brain, blood and soft tissue windows. Coronal and sagittal reformats are provided for review.   FINDINGS: CSF Spaces: There is again a small amount of hyperdense subdural hemorrhage along the posterior cerebral convexity, dorsal right aspect of the tentorium, and dorsal right aspect of the falx  overall very similar from the previous examination. There is minimal mass effect on subjacent sulci but no measurable midline shift. There is again prominence of ventricles and sulci compatible with diffuse parenchymal volume loss.   Parenchyma:  There are nonspecific areas of diminished attenuation in the subcortical and periventricular white matter. Otherwise, the grey-white differentiation is intact. There is no mass effect or midline shift.  There is no intracranial hemorrhage.   Calvarium: The calvarium is unremarkable.   Paranasal sinuses and mastoids: There is hyperostosis of the left maxillary sinus walls. Mucosal thickening and fluid are noted within the maxillary sinuses, left greater than right. There is opacification of several ethmoid air cells, left greater than right. There are polypoid appearing foci in the nasal cavities. The mastoid air cells are clear.   There is nonspecific opacity in the external auditory canals which could represent cerumen. Correlation with direct visualization is recommended.   An enteric tube is partially visualized. There are secretions in the nasopharynx.       Redemonstration of subdural hematoma overlying the posterior cerebral convexity on the right and extending along the right tentorium and the dorsal right aspect of the falx overall very similar from the previous examination. There is minimal local mass effect but no measurable midline shift.     MACRO: None   Signed by: Jemma Landis 4/14/2024 6:47 AM Dictation workstation:   [id number]    Transthoracic Echo (TTE) Complete    Result Date: 4/13/2024    [facility], [address] [place] [id number] [phone] and                                 Fax [phone] TRANSTHORACIC ECHOCARDIOGRAM REPORT  Patient Name:      KATRINA TAPIA       Reading Physician:    [id number] Tyrone Carrillo MD Study Date:        4/13/2024            Ordering  Provider:    [id number] JAYA TRAVIS MRN/PID:           [id number]             Fellow: Accession#:        [id number]         Nurse: Date of Birth/Age: 1960 / 64 years Sonographer:          Lyly Blakely RDCS Gender:            M                    Additional Staff: Height:            182.00 cm            Admit Date:           4/12/2024 Weight:            95.00 kg             Admission Status:     Inpatient - Time                                                               Critical BSA / BMI:         2.16 m2 / 28.68      Encounter#:           [id number]                    kg/m2                                         Department Location:  [address]                                                               [facility] Blood Pressure: 117 /61 mmHg Study Type:    TRANSTHORACIC ECHO (TTE) COMPLETE Diagnosis/ICD: Unspecified atrial flutter-I48.92 Indication:    s/p cardiac arrest CPT Code:      Echo Complete w Full Doppler-12930 Patient History: Pertinent History: HTN. Study Detail: The following Echo studies were performed: 2D, M-Mode, Doppler and               color flow. Technically challenging study due to patient lying in               supine position, movement artifact from an oscillating ventilator,               prominent lung artifact and chest tubes.  PHYSICIAN INTERPRETATION: Left Ventricle: The left ventricular systolic function is normal, with an estimated ejection fraction of 60-65%. There are no regional wall motion abnormalities. The left ventricular cavity size is normal. There is mild concentric left ventricular hypertrophy. Left ventricular diastolic filling was indeterminate. Left Atrium: The left atrium is normal in size. Right Ventricle: The right ventricle is normal in size. There is normal right ventricular global systolic function. A device is visualized in  the right ventricle. Right Atrium: The right atrium is normal in size. There is a device visualized in the right atrium. Aortic Valve: The aortic valve is trileaflet. There is no evidence of aortic valve regurgitation. The peak instantaneous gradient of the aortic valve is 9.5 mmHg. The mean gradient of the aortic valve is 5.0 mmHg. Mitral Valve: The mitral valve is normal in structure. There is trace mitral valve regurgitation. Tricuspid Valve: The tricuspid valve is structurally normal. There is trace tricuspid regurgitation. The Doppler estimated RVSP is within normal limits at 24.3 mmHg. Pulmonic Valve: The pulmonic valve is not well visualized. There is no indication of pulmonic valve regurgitation. Pericardium: There is no pericardial effusion noted. Aorta: The aortic root is normal.  CONCLUSIONS:  1. Left ventricular systolic function is normal with a 60-65% estimated ejection fraction.  2. RVSP within normal limits. QUANTITATIVE DATA SUMMARY: 2D MEASUREMENTS:                           Normal Ranges: IVSd:          1.26 cm    (0.6-1.1cm) LVPWd:         1.13 cm    (0.6-1.1cm) LVIDd:         4.85 cm    (3.9-5.9cm) LVIDs:         2.73 cm LV Mass Index: 102.3 g/m2 LV % FS        43.7 % AORTA MEASUREMENTS:                    Normal Ranges: Asc Ao, d: 3.40 cm (2.1-3.4cm) LV SYSTOLIC FUNCTION BY 2D PLANIMETRY (MOD):                     Normal Ranges: EF-A4C View: 59.7 % (>=55%) EF-A2C View: 57.9 % EF-Biplane:  59.6 % AORTIC VALVE:                                   Normal Ranges: AoV Vmax:                1.54 m/s (<=1.7m/s) AoV Peak P.5 mmHg (<20mmHg) AoV Mean P.0 mmHg (1.7-11.5mmHg) LVOT Max Dmitriy:            1.51 m/s (<=1.1m/s) AoV VTI:                 25.70 cm (18-25cm) LVOT VTI:                24.20 cm LVOT Diameter:           2.00 cm  (1.8-2.4cm) AoV Area, VTI:           2.96 cm2 (2.5-5.5cm2) AoV Area,Vmax:           3.08 cm2 (2.5-4.5cm2) AoV Dimensionless Index: 0.94  RIGHT  VENTRICLE: RV Basal 3.23 cm RV Mid   2.02 cm RV Major 7.1 cm TAPSE:   22.9 mm RV s'    0.23 m/s TRICUSPID VALVE/RVSP:                             Normal Ranges: Peak TR Velocity: 2.31 m/s RV Syst Pressure: 24.3 mmHg (< 30mmHg) PULMONIC VALVE:                      Normal Ranges: PV Max Dmitriy: 1.4 m/s  (0.6-0.9m/s) PV Max P.0 mmHg PV Mean P.0 mmHg PV VTI:     23.00 cm  [id number] Tyrone Carrillo MD Electronically signed on 2024 at 12:26:46 PM  ** Final **     CT head wo IV contrast    Result Date: 2024  Interpreted By:  Flores Jeter, STUDY: CT HEAD WO IV CONTRAST;  2024 7:10 am   INDICATION: Signs/Symptoms:To see progression of subdural hemorrhage as per NSGY recommendations.   COMPARISON: 2024 CT brain.   ACCESSION NUMBER(S): [id number]   ORDERING CLINICIAN: JAYA TRAVIS   TECHNIQUE: CT axial images through the Brain were obtained without contrast.   All CT exams are performed with 1 or more of the following dose reduction techniques: Automatic exposure control, adjustment of mA and/or kv according to patient size, or use of iterative reconstruction techniques.   FINDINGS: Again noted is a small hyperdense subdural hematoma overlying the right posterior cerebral convexity with a maximum thickness of 5.8 mm. This slightly tracts along the right side of the interhemispheric fissure posteriorly and minimally extends along the right lateral cerebellar tentorium. No midline shift. Minimal effacement of right posterior convexity sulci. No new hemorrhage.   Ventricles appear normal. No sign of infarct.   Air-fluid levels are noted in the maxillary sinuses bilaterally. Paranasal sinus mucosal thickening is seen. Debris is noted filling the external auditory canals bilaterally.. The visualized portions of the orbits are unremarkable.       1. Stable right posterior cerebral convexity subdural hematoma with no midline shift and only minimal effacement of sulci. This minimally extends along the  right side of the posterior interhemispheric fissure and right lateral aspect of the right cerebellar tentorium 2. Acute and chronic paranasal sinus inflammatory changes and debris noted in the external auditory canals bilaterally   MACRO: None.   Signed by: Flores Jeter 4/13/2024 7:29 AM Dictation workstation:   [id number]    CT head wo IV contrast    Result Date: 4/13/2024  Interpreted By:  Israel Block, STUDY: CT HEAD WO IV CONTRAST;  4/12/2024 11:57 pm   INDICATION: Signs/Symptoms:s/p cardiac arrest.   COMPARISON: None.   ACCESSION NUMBER(S): [id number]   ORDERING CLINICIAN: JAYA TRAVIS   TECHNIQUE: Noncontrast axial CT scan of head was performed. Angled reformats in brain and bone windows were generated. The images were reviewed in bone, brain, blood and soft tissue windows.   FINDINGS: A hyperdense layering subdural hemorrhage, measuring up to 5 mm in width (series 205, image 69) overlies the posterior right cerebral hemisphere, with slight mass effect on the adjacent brain parenchyma and no significant midline shift. There may be slight effacement of the left parietal and occipital sulci. Trace amount of hemorrhage also layers along the right lateral margin of the posterior falx.   No additional areas of hyperdense intracranial hemorrhage are identified.   Subtle attenuation changes are present in the periventricular and subcortical white matter of bilateral cerebral hemispheres. No large CT apparent transcortical infarct is identified at this time.   No ventricular dilatation is present. Basal cisterns are patent.   Scalp soft tissues do not demonstrate any acute abnormality. Calvarium is unremarkable in appearance.   Mucosal thickening is present in scattered ethmoidal air cells.       1.  A hyperdense subdural hemorrhage measuring up to 5 mm in width overlies the posterior right cerebral hemisphere with slight mass effect on the adjacent brain parenchyma and some effacement of the adjacent  right occipital sulci, although there is no significant midline shift. Trace amount of hemorrhage also tracks along the right lateral aspect of the posterior falx. 2. No evidence of intraparenchymal or intraventricular hemorrhage. 3. Subtle attenuation changes are present in the periventricular and subcortical white matter of bilateral cerebral hemispheres, favored to represent sequela of microvascular disease. No evidence of large CT apparent transcortical infarct at this time, although the gray-white differentiation is somewhat difficult to differentiate. MRI can be considered for additional characterization if there is suspicion of anoxic injury.   MACRO: None   Signed by: Israel Block 4/13/2024 1:28 AM Dictation workstation:   [id number]    XR abdomen 1 view    Result Date: 4/12/2024  Interpreted By:  Flores Jeter, STUDY: XR ABDOMEN 1 VIEW;  4/12/2024 11:12 pm   INDICATION: Signs/Symptoms:ng tube placement.   COMPARISON: None.   ACCESSION NUMBER(S): [id number]   ORDERING CLINICIAN: JAYA TRAVIS   FINDINGS: A nasogastric tube is noted with the tip along the greater curvature of the stomach. There is a nonspecific nonobstructive bowel gas pattern. The right abdomen and lower pelvis are not entirely included on the exam.       1.  Nasogastric tube overlies the stomach   MACRO: None   Signed by: Flores Jeter 4/12/2024 11:25 PM Dictation workstation:   [id number]    XR chest 1 view    Result Date: 4/12/2024  Interpreted By:  Agusto Paredes, STUDY: XR CHEST 1 VIEW  4/12/2024 7:49 pm   INDICATION: Signs/Symptoms:ETT tip  location after advancing ETT tube   COMPARISON: 04/12/2024   ACCESSION NUMBER(S): [id number]   ORDERING CLINICIAN: JAYA TRAVIS   TECHNIQUE: A single AP portable radiograph of the chest was obtained.   FINDINGS: Multiple cardiac monitoring leads are seen over the chest.  A 2 lead pacer is seen over the left chest. There has been interval repositioning of the endotracheal tube, with  the tip now seen approximately 5 cm above the angelika.. No focal infiltrate, pleural effusion or pneumothorax is identified. The cardiac silhouette is within normal limits for size.       No focal infiltrate or pneumothorax is identified.   MACRO: None.   Signed by: Agusto Paredes 4/12/2024 7:51 PM Dictation workstation:   [id number]    XR chest 1 view    Result Date: 4/12/2024  Interpreted By:  Flores Jeter, STUDY: XR CHEST 1 VIEW 4/12/2024 6:44 pm   INDICATION: Signs/Symptoms:Intubated, placement   COMPARISON: 04/11/2024   ACCESSION NUMBER(S): [id number]   ORDERING CLINICIAN: LENIN KRAMER   TECHNIQUE: AP view   FINDINGS: Endotracheal tube overlies the upper trachea with the tip at the level of the clavicular heads approximately 7.7 cm above the angelika. Pacemaker overlies the left chest with 2 transvenous leads overlying the expected location of the right atrium and right ventricle. There is mild enlargement of the cardiac silhouette. There is pulmonary venous congestion with increased interstitial markings likely interstitial edema. Low lung volumes.       Endotracheal tube tip overlies the trachea 7.7 cm above the angelika. Signs of mild CHF have developed since the prior exam   Signed by: Flores Jeter 4/12/2024 7:12 PM Dictation workstation:   [id number]    ECG 12 lead    Result Date: 4/11/2024  Accelerated Junctional rhythm Incomplete right bundle branch block ST & Marked T wave abnormality, consider anterolateral ischemia Prolonged QT Abnormal ECG When compared with ECG of 22-NOV-2022 14:22, PREVIOUS ECG IS PRESENT See ED provider note for full interpretation and clinical correlation Confirmed by Shyanne Sellers ([id number]) on 4/11/2024 9:16:40 PM    CT chest w IV contrast    Result Date: 4/11/2024  Interpreted By:  Tabatha Tate, STUDY: CT CHEST W IV CONTRAST;  4/11/2024 5:40 pm   INDICATION: Signs/Symptoms:Abnormal Chest Xray   COMPARISON: Chest x-ray 04/11/2024. Gallbladder ultrasound 04/10/2024. CT  abdomen and pelvis 11/22/2022.   ACCESSION NUMBER(S): [id number]   ORDERING CLINICIAN: LENIN KRAMER   TECHNIQUE: Axial CT images were obtained through the chest following the uneventful administration of intravenous contrast material.  Coronal and sagittal reformats were performed.   FINDINGS: There is motion artifact.   HEART AND VESSELS: There is 4.1 cm aneurysmal dilation of the ascending thoracic aorta. The phase of intravenous contrast is not tailored to evaluate the thoracic aorta. Mild atherosclerotic calcifications at the thoracic aorta. The main pulmonary artery is dilated. The heart is not enlarged.    No evidence of pericardial effusion.   MEDIASTINUM AND SHAGUFTA, LOWER NECK AND AXILLA: The visualized thyroid gland is within normal limits.   No evidence of thoracic lymphadenopathy by CT criteria. No mediastinal mass.   LUNGS AND AIRWAYS: The trachea and central airways are patent.   The evaluation of the lungs is degraded by motion artifact. Mild ground-glass opacities are noted at the left upper lobe and bilateral lower lobes. No consolidation, pleural effusion or pneumothorax. No obvious pulmonary nodule is identified. There are mild emphysematous changes.   UPPER ABDOMEN: The visualized liver demonstrates a mildly nodular contour. Subcentimeter hypodensity at the periphery of the right hepatic lobe is too small to be adequately characterized. There is cholelithiasis. The spleen is mildly enlarged measuring 14 cm. There is diffuse fatty infiltration of the pancreas. Soft tissue stranding noted at the left upper quadrant adjacent to the visualized descending colon. Atherosclerotic calcifications at the visualized abdominal aorta.   CHEST WALL AND OSSEOUS STRUCTURES: There is a left-sided pacemaker. Multilevel degenerative changes at the spine.       1. Study degraded by motion artifact. No obvious pulmonary nodule is identified. No mediastinal mass. 2. Mild ground-glass opacities at the left upper lobe  and bilateral lower lobes, may be secondary to atelectasis or pneumonia. 3. Mild emphysema. 4. Dilation of the main pulmonary artery. Please correlate for pulmonary arterial hypertension. 5. 4.1 cm ascending thoracic aortic aneurysm. 6. Mildly nodular contour of the liver. Is there any clinical history of cirrhosis? 7. Mild splenomegaly. 8. Cholelithiasis. 9. Inflammatory changes at the left upper quadrant adjacent to the visualized descending colon. CT abdomen and pelvis recommended for further evaluation.     MACRO: Critical Finding:  See findings. Notification was initiated on 4/11/2024 at 7:06 pm by  Tabatha Tate.  (**-OCF-**) Instructions:   Signed by: Tabatha Tate 4/11/2024 7:21 PM Dictation workstation:   [id number]    XR chest 2 views    Result Date: 4/11/2024  Interpreted By:  Milton Munson, STUDY: XR CHEST 2 VIEWS; 4/11/2024 10:19 am   INDICATION: Signs/Symptoms:Mild hypoxia overnight..   COMPARISON: 11/24/2022   ACCESSION NUMBER(S): [id number]   ORDERING CLINICIAN: LENIN KRAMER   TECHNIQUE: AP and lateral views of the chest were obtained.   FINDINGS: The cardiac silhouette is normal in appearance. A bipolar cardiac pacemaker is present.  Trachea is effaced in deviated towards the right for distance of approximately 8 cm suggesting a mediastinal mass. Lateral view is limited by motion artifact but raises concern for possible pulmonary nodules, difficult to appreciate on the frontal view. No alveolar consolidation is noted. No effusions are identified.       1. Limited exam as above. 2. CT chest with intravenous contrast is recommended however to rule out a mediastinal mass effacing the trachea as well as to exclude the possibility pulmonary nodules suggested on the lateral view.   Urgent message sent by secure chat.   MACRO: none   Signed by: Milton Munson 4/11/2024 10:56 AM Dictation workstation:   [id number]    US gallbladder    Result Date: 4/10/2024  STUDY: Right Upper Quadrant  Ultrasound; 4/10/2024 11:19 AM INDICATION: Elevated LFT. COMPARISON: US RUQ 7/21/2020, CT A/P 11/22/2022. ACCESSION NUMBER(S): [id number] ORDERING CLINICIAN: JAKOB FELICIANO TECHNIQUE: Ultrasound of the Right Upper Quadrant. FINDINGS: LIVER: The liver parenchyma is heterogeneous in echogenicity, coarsened in echotexture, and there is nodularity to the contour.  There is no mass identified.   GALLBLADDER: The gallbladder contains multiple stones.  There is no pericholecystic fluid or wall thickening.  Sonographic Smith's sign is negative.    BILE DUCTS: The common bile duct measures 0.2 cm.  There is no intrahepatic biliary dilatation.   PANCREAS: The pancreas is not well seen due to overlying bowel gas.  RIGHT KIDNEY: The right kidney measures 11.5 cm in length.  Renal cortical echotexture is normal.  There is no hydronephrosis.  There are no stones.  There is a simple cyst within the lower pole measuring 0.9 x 0.6 x 0.7 cm.    Cirrhotic changes within the liver. Cholelithiasis.  No gallbladder wall thickening or biliary dilatation is appreciated. No significant change compared to prior imaging. Signed by Huang Yuan MD    * Cannot find OR log *  Last relevant procedure:        This patient currently has cardiac telemetry ordered; if you would like to modify or discontinue the telemetry order, click here to go to the orders activity to modify/discontinue the order.             Assessment/Plan   Active Problems:    Atrial flutter, unspecified type (Multi)    .  The patient is getting IV fentanyl some propofol was administered the order of the intensivist.  The EGD showed a small hiatal hernia with a small 1+ esophageal varix no evidence of any esophagitis or variceal bleeding noted stomach showed intense gastritis with multiple suction jono from the NG tube I was unable to find any ulcers or any other source of bleeding all the way to the junction between the second and third portion of the duodenum.  I even  gave glucagon 1 mL IV to effect relaxation and to make sure no also solid mass within the falls.  I do not think there is anything in the upper GI tract to account for this patient's bleeding.  The patient has significant thrombocytopenia and also his INR is elevated I would recommend vitamin K for him.  We have no idea whether when he had his last colonoscopy evaluation.  For the time being I would recommend treating him conservatively with with fluid and blood replacement and platelets if needed if multiple transfusions were needed.  Hold off any colonoscopy evaluation for the time being  I will sign of the case for the time being and Dr. Iqbal will be available starting tomorrow  This note was created using Dragon dictation technology and unintended errors of omission commission may be present in spite of proofreading    I spent 25minutes in the professional and overall care of this patient.      Wu Geller MD

## 2024-04-15 NOTE — PROGRESS NOTES
Occupational Therapy                 Therapy Communication Note    Patient Name: Trey Borjas  MRN: [id number]  Today's Date: 4/15/2024     Discipline: Occupational Therapy    Missed Visit Reason: Missed Visit Reason:  (pt. on  hold, per RN, EGD pending this date due to bloody stools)    Missed Time: Attempt    Comment:

## 2024-04-15 NOTE — PROGRESS NOTES
"Katrina Borjas is a 64 y.o. male on day 3 of admission presenting with Atrial flutter, unspecified type (Multi).        Subjective   Seen this morning patient is being medically managed by ICU at this time, he remains on ventilation with endotracheal intubation     Objective     Last Recorded Vitals  /71   Pulse 97   Temp 36.8 °C (98.2 °F)   Resp 18   Ht 1.829 m (6' 0.01\")   Wt 98.6 kg (217 lb 6 oz)   SpO2 95%   BMI 29.47 kg/m²     Intake/Output last 3 Shifts:  I/O last 3 completed shifts:  In: 3670.8 (37.2 mL/kg) [I.V.:1380.8 (14 mL/kg); Blood:1100; NG/GT:240; IV Piggyback:950]  Out: 2280 (23.1 mL/kg) [Urine:1480 (0.4 mL/kg/hr); Emesis/NG output:800]  Weight: 98.6 kg       =========RELEVANT RESULTS ==========  Labs  Lab Results   Component Value Date    WBC 8.6 04/15/2024    HGB 8.7 (L) 04/15/2024    HCT 26.0 (L) 04/15/2024     (H) 04/15/2024    PLT 85 (L) 04/15/2024     Lab Results   Component Value Date    GLUCOSE 147 (H) 04/15/2024    CALCIUM 8.6 04/15/2024     04/15/2024    K 3.7 04/15/2024    CO2 26 04/15/2024     04/15/2024    BUN 8 04/15/2024    CREATININE 0.69 04/15/2024      Lab Results   Component Value Date    ALT 38 04/15/2024    AST 97 (H) 04/15/2024    ALKPHOS 121 04/15/2024    BILITOT 4.9 (H) 04/15/2024        Recent Echocardiogram (14D):   Transthoracic Echo (TTE) Complete    Result Date: 4/13/2024    [facility], [address] [place] [address], [facility] [id number] [phone] and                                 Fax [phone] TRANSTHORACIC ECHOCARDIOGRAM REPORT  Patient Name:      KATRINA De Anda Physician:    [id number] Tyrone Carrillo MD Study Date:        4/13/2024            Ordering Provider:    [id number] JAYA TRAVIS MRN/PID:           [id number]             Fellow: Accession#:        [id number]         Nurse: Date of Birth/Age: " 1960 / 64 years Sonographer:          Lyly Blakely RDCS Gender:            M                    Additional Staff: Height:            182.00 cm            Admit Date:           4/12/2024 Weight:            95.00 kg             Admission Status:     Inpatient - Time                                                               Critical BSA / BMI:         2.16 m2 / 28.68      Encounter#:           [id number]                    kg/m2                                         Department Location:  [place] 1st floor                                                               [facility] Blood Pressure: 117 /61 mmHg Study Type:    TRANSTHORACIC ECHO (TTE) COMPLETE Diagnosis/ICD: Unspecified atrial flutter-I48.92 Indication:    s/p cardiac arrest CPT Code:      Echo Complete w Full [id number] Patient History: Pertinent History: HTN. Study Detail: The following Echo studies were performed: 2D, M-Mode, Doppler and               color flow. Technically challenging study due to patient lying in               supine position, movement artifact from an oscillating ventilator,               prominent lung artifact and chest tubes.  PHYSICIAN INTERPRETATION: Left Ventricle: The left ventricular systolic function is normal, with an estimated ejection fraction of 60-65%. There are no regional wall motion abnormalities. The left ventricular cavity size is normal. There is mild concentric left ventricular hypertrophy. Left ventricular diastolic filling was indeterminate. Left Atrium: The left atrium is normal in size. Right Ventricle: The right ventricle is normal in size. There is normal right ventricular global systolic function. A device is visualized in the right ventricle. Right Atrium: The right atrium is normal in size. There is a device visualized in the right atrium. Aortic Valve: The aortic valve is trileaflet. There is no evidence of aortic valve  regurgitation. The peak instantaneous gradient of the aortic valve is 9.5 mmHg. The mean gradient of the aortic valve is 5.0 mmHg. Mitral Valve: The mitral valve is normal in structure. There is trace mitral valve regurgitation. Tricuspid Valve: The tricuspid valve is structurally normal. There is trace tricuspid regurgitation. The Doppler estimated RVSP is within normal limits at 24.3 mmHg. Pulmonic Valve: The pulmonic valve is not well visualized. There is no indication of pulmonic valve regurgitation. Pericardium: There is no pericardial effusion noted. Aorta: The aortic root is normal.  CONCLUSIONS:  1. Left ventricular systolic function is normal with a 60-65% estimated ejection fraction.  2. RVSP within normal limits. QUANTITATIVE DATA SUMMARY: 2D MEASUREMENTS:                           Normal Ranges: IVSd:          1.26 cm    (0.6-1.1cm) LVPWd:         1.13 cm    (0.6-1.1cm) LVIDd:         4.85 cm    (3.9-5.9cm) LVIDs:         2.73 cm LV Mass Index: 102.3 g/m2 LV % FS        43.7 % AORTA MEASUREMENTS:                    Normal Ranges: Asc Ao, d: 3.40 cm (2.1-3.4cm) LV SYSTOLIC FUNCTION BY 2D PLANIMETRY (MOD):                     Normal Ranges: EF-A4C View: 59.7 % (>=55%) EF-A2C View: 57.9 % EF-Biplane:  59.6 % AORTIC VALVE:                                   Normal Ranges: AoV Vmax:                1.54 m/s (<=1.7m/s) AoV Peak P.5 mmHg (<20mmHg) AoV Mean P.0 mmHg (1.7-11.5mmHg) LVOT Max Dmitriy:            1.51 m/s (<=1.1m/s) AoV VTI:                 25.70 cm (18-25cm) LVOT VTI:                24.20 cm LVOT Diameter:           2.00 cm  (1.8-2.4cm) AoV Area, VTI:           2.96 cm2 (2.5-5.5cm2) AoV Area,Vmax:           3.08 cm2 (2.5-4.5cm2) AoV Dimensionless Index: 0.94  RIGHT VENTRICLE: RV Basal 3.23 cm RV Mid   2.02 cm RV Major 7.1 cm TAPSE:   22.9 mm RV s'    0.23 m/s TRICUSPID VALVE/RVSP:                             Normal Ranges: Peak TR Velocity: 2.31 m/s RV Syst Pressure: 24.3  mmHg (< 30mmHg) PULMONIC VALVE:                      Normal Ranges: PV Max Dmitriy: 1.4 m/s  (0.6-0.9m/s) PV Max P.0 mmHg PV Mean P.0 mmHg PV VTI:     23.00 cm  [id number] Tyrone Carrillo MD Electronically signed on 2024 at 12:26:46 PM  ** Final **      TTE 12 month if available:  Transthoracic Echo (TTE) Complete    Result Date: 2024    [facility], [address] [place] [id number] [phone] and                                 Fax [phone] TRANSTHORACIC ECHOCARDIOGRAM REPORT  Patient Name:      KATRINA REGINA       Reading Physician:    [id number] Carrillo MD Study Date:        2024            Ordering Provider:    [id number] JAYA TRAVIS MRN/PID:           [id number]             Fellow: Accession#:        [id number]         Nurse: Date of Birth/Age: 1960 / 64 years Sonographer:          Lyly Blakely RDCS Gender:            M                    Additional Staff: Height:            182.00 cm            Admit Date:           2024 Weight:            95.00 kg             Admission Status:     Inpatient - Time                                                               Critical BSA / BMI:         2.16 m2 / 28.68      Encounter#:           [id number]                    kg/m2                                         Department Location:  [place] 1st [facility]                                                               [facility] Center Blood Pressure: 117 /61 mmHg Study Type:    TRANSTHORACIC ECHO (TTE) COMPLETE Diagnosis/ICD: Unspecified atrial flutter-I48.92 Indication:    s/p cardiac arrest CPT Code:      Echo Complete w Full Doppler-[id number] Patient History: Pertinent History: HTN. Study Detail: The following Echo studies were performed: 2D, M-Mode, Doppler and               color flow.  Technically challenging study due to patient lying in               supine position, movement artifact from an oscillating ventilator,               prominent lung artifact and chest tubes.  PHYSICIAN INTERPRETATION: Left Ventricle: The left ventricular systolic function is normal, with an estimated ejection fraction of 60-65%. There are no regional wall motion abnormalities. The left ventricular cavity size is normal. There is mild concentric left ventricular hypertrophy. Left ventricular diastolic filling was indeterminate. Left Atrium: The left atrium is normal in size. Right Ventricle: The right ventricle is normal in size. There is normal right ventricular global systolic function. A device is visualized in the right ventricle. Right Atrium: The right atrium is normal in size. There is a device visualized in the right atrium. Aortic Valve: The aortic valve is trileaflet. There is no evidence of aortic valve regurgitation. The peak instantaneous gradient of the aortic valve is 9.5 mmHg. The mean gradient of the aortic valve is 5.0 mmHg. Mitral Valve: The mitral valve is normal in structure. There is trace mitral valve regurgitation. Tricuspid Valve: The tricuspid valve is structurally normal. There is trace tricuspid regurgitation. The Doppler estimated RVSP is within normal limits at 24.3 mmHg. Pulmonic Valve: The pulmonic valve is not well visualized. There is no indication of pulmonic valve regurgitation. Pericardium: There is no pericardial effusion noted. Aorta: The aortic root is normal.  CONCLUSIONS:  1. Left ventricular systolic function is normal with a 60-65% estimated ejection fraction.  2. RVSP within normal limits. QUANTITATIVE DATA SUMMARY: 2D MEASUREMENTS:                           Normal Ranges: IVSd:          1.26 cm    (0.6-1.1cm) LVPWd:         1.13 cm    (0.6-1.1cm) LVIDd:         4.85 cm    (3.9-5.9cm) LVIDs:         2.73 cm LV Mass Index: 102.3 g/m2 LV % FS        43.7 % AORTA MEASUREMENTS:                     Normal Ranges: Asc Ao, d: 3.40 cm (2.1-3.4cm) LV SYSTOLIC FUNCTION BY 2D PLANIMETRY (MOD):                     Normal Ranges: EF-A4C View: 59.7 % (>=55%) EF-A2C View: 57.9 % EF-Biplane:  59.6 % AORTIC VALVE:                                   Normal Ranges: AoV Vmax:                1.54 m/s (<=1.7m/s) AoV Peak P.5 mmHg (<20mmHg) AoV Mean P.0 mmHg (1.7-11.5mmHg) LVOT Max Dmitriy:            1.51 m/s (<=1.1m/s) AoV VTI:                 25.70 cm (18-25cm) LVOT VTI:                24.20 cm LVOT Diameter:           2.00 cm  (1.8-2.4cm) AoV Area, VTI:           2.96 cm2 (2.5-5.5cm2) AoV Area,Vmax:           3.08 cm2 (2.5-4.5cm2) AoV Dimensionless Index: 0.94  RIGHT VENTRICLE: RV Basal 3.23 cm RV Mid   2.02 cm RV Major 7.1 cm TAPSE:   22.9 mm RV s'    0.23 m/s TRICUSPID VALVE/RVSP:                             Normal Ranges: Peak TR Velocity: 2.31 m/s RV Syst Pressure: 24.3 mmHg (< 30mmHg) PULMONIC VALVE:                      Normal Ranges: PV Max Dmitriy: 1.4 m/s  (0.6-0.9m/s) PV Max P.0 mmHg PV Mean P.0 mmHg PV VTI:     23.00 cm  [id number] Tyrone Carrillo MD Electronically signed on 2024 at 12:26:46 PM  ** Final **      Recent Imaging Results:  Electrocardiogram, 12-lead PRN ACS symptoms  Ventricular-paced complexes    See ED provider note for full interpretation and clinical correlation  Confirmed by Shyanne Barnett ([id number]) on 2024 11:10:09 AM  CT head wo IV contrast  Narrative: Interpreted By:  Jemma Landis,   STUDY:  CT HEAD WO IV CONTRAST;  2024 4:21 am      INDICATION:  Signs/Symptoms:SDH, interval scan for progression.      COMPARISON:  2024      ACCESSION NUMBER(S):  [id number]      ORDERING CLINICIAN:  OKSANA PANIAGUA      TECHNIQUE:  Noncontrast axial CT scan of head was performed. Angled reformats in  brain and bone windows were generated. The images were reviewed in  bone, brain, blood and soft tissue windows. Coronal and  sagittal  reformats are provided for review.      FINDINGS:  CSF Spaces: There is again a small amount of hyperdense subdural  hemorrhage along the posterior cerebral convexity, dorsal right  aspect of the tentorium, and dorsal right aspect of the falx overall  very similar from the previous examination. There is minimal mass  effect on subjacent sulci but no measurable midline shift. There is  again prominence of ventricles and sulci compatible with diffuse  parenchymal volume loss.      Parenchyma:  There are nonspecific areas of diminished attenuation in  the subcortical and periventricular white matter. Otherwise, the  grey-white differentiation is intact. There is no mass effect or  midline shift.  There is no intracranial hemorrhage.      Calvarium: The calvarium is unremarkable.      Paranasal sinuses and mastoids: There is hyperostosis of the left  maxillary sinus walls. Mucosal thickening and fluid are noted within  the maxillary sinuses, left greater than right. There is  opacification of several ethmoid air cells, left greater than right.  There are polypoid appearing foci in the nasal cavities. The mastoid  air cells are clear.      There is nonspecific opacity in the external auditory canals which  could represent cerumen. Correlation with direct visualization is  recommended.      An enteric tube is partially visualized. There are secretions in the  nasopharynx.      Impression: Redemonstration of subdural hematoma overlying the posterior cerebral  convexity on the right and extending along the right tentorium and  the dorsal right aspect of the falx overall very similar from the  previous examination. There is minimal local mass effect but no  measurable midline shift.          MACRO:  None      Signed by: Jemma Landis 4/14/2024 6:47 AM  Dictation workstation:   [id number]      Exam     GENERAL: On sedation and mechanical ventilation.   HEENT: ET tube and OG tube in place.  CARDIOVASCULAR: IRI    RESPIRATORY: Patent airways, on mechanical ventilation   ABDOMEN: Soft, Normal Bowel Sounds, at baseline distention  SKIN: Warm and dry, no lesions, no rashes  EXTREMITIES: normal extremities, no significant cyanosis edema, contusions or wounds, no obsvious clubbing      Additional Physical Exam Notes/Findings        ======= SCHEDULED MEDICATIONS =======  Scheduled medications   Medication Dose Route Frequency    [Held by provider] allopurinol  300 mg oral Daily    cefepime  500 mg intravenous q8h    folic acid  1 mg oral Daily    lactulose  20 g oral TID    levETIRAcetam  500 mg intravenous q12h    magnesium sulfate  2 g intravenous Once    multivitamin with minerals  1 tablet oral Daily    oxygen   inhalation Continuous - Inhalation    pantoprazole  40 mg intravenous q12h    potassium chloride  40 mEq oral Once    tamsulosin  0.4 mg oral Daily    thiamine  200 mg intravenous TID    vancomycin  1,250 mg intravenous q12h       ========== PRN MEDICATIONS =========  acetaminophen, 650 mg, q6h PRN  vancomycin, , Daily PRN        ==============  DIET  ==============  Dietary Orders (From admission, onward)       Start     Ordered    04/12/24 2136  NPO Diet; Effective now  Diet effective now         04/12/24 2137                    ====== Assessment/Plan   =======    ASSESSMENT:  Active Problems:    Atrial flutter, unspecified type (Multi)    ___________________________________________________    Acute cardiac arrest  Acute subdural hemorrhage  Acute Metabolic encephalopathy  Acute hypoxic respiratory failure  Atrial Flutter/vtach/torsades unclear etiology - cardiology following   Mild metabolic acidosis  Alcohol use disorder  Etoh cirrhosis h/o GIB  Hypokalemia  Peripheral Neuropathy b/l feet      PLAN:  Repeat CTH  NSG following recommending Keppra 500mg BID for seizure ppx   TTE unremarkable.   BP soft at times but stable.     Mgmt per ICU  Arrhythmia management per cardiology     DVT Prophylaxis  Held for GIB  concern    SUMMARY/DISPOSITION  Mgmt per ICU team at this time.

## 2024-04-15 NOTE — PROGRESS NOTES
"Trey Borjas is a 64 y.o. male on day 3 of admission presenting with Atrial flutter, unspecified type (Multi).    Subjective   No adverse events overnight       Objective   CT Head repeated 04/14/2024. Findings are stable to my review of images with comparison to prior CT Head. Remains intubated  Physical Exam  Well nourished, well developed male, resting in bed, intubated  Opens eyes, follows commands readily  Skin is warm / dry, multiple small lesions on BUE skin   Thorax is midline; chest expansion is symmetric  CHRISTIANSON in bed,  is equal  Abdomen is not distended  Urine - gallegos catheter in place    Last Recorded Vitals  Blood pressure 109/57, pulse 97, temperature 36.9 °C (98.4 °F), temperature source Temporal, resp. rate 23, height 1.829 m (6' 0.01\"), weight 99 kg (218 lb 4.1 oz), SpO2 94%.  Intake/Output last 3 Shifts:  I/O last 3 completed shifts:  In: 5758.8 (58.2 mL/kg) [I.V.:1699.1 (17.2 mL/kg); Blood:2819.7; NG/GT:340; IV Piggyback:900]  Out: 2013 (20.3 mL/kg) [Urine:863 (0.2 mL/kg/hr); Emesis/NG output:1150]  Weight: 99 kg     Medications:  Scheduled medications  [Held by provider] allopurinol, 300 mg, oral, Daily  cefepime, 500 mg, intravenous, q8h  folic acid, 1 mg, oral, Daily  lactulose, 20 g, oral, TID  levETIRAcetam, 500 mg, intravenous, q12h  multivitamin with minerals, 1 tablet, oral, Daily  oxygen, , inhalation, Continuous - Inhalation  pantoprazole, 40 mg, intravenous, q12h  tamsulosin, 0.4 mg, oral, Daily  thiamine, 200 mg, intravenous, TID  vancomycin, 1,250 mg, intravenous, q12h      Continuous medications  dexmedeTOMIDine, 0.1-1.5 mcg/kg/hr, Last Rate: Stopped (04/14/24 2000)  dextrose 5 % and lactated Ringer's, 125 mL/hr, Last Rate: 125 mL/hr (04/15/24 0439)  fentaNYL, 25 mcg/hr, Last Rate: 25 mcg/hr (04/15/24 0439)  octreotide, 50 mcg/hr, Last Rate: 50 mcg/hr (04/15/24 0439)      PRN medications  PRN medications: acetaminophen, vancomycin    Relevant Results  Results for orders placed or " performed during the hospital encounter of 04/10/24 (from the past 24 hour(s))   CBC   Result Value Ref Range    WBC 8.0 4.4 - 11.3 x10*3/uL    nRBC 0.5 (H) 0.0 - 0.0 /100 WBCs    RBC 2.27 (L) 4.50 - 5.90 x10*6/uL    Hemoglobin 7.9 (L) 13.5 - 17.5 g/dL    Hematocrit 23.7 (L) 41.0 - 52.0 %     (H) 80 - 100 fL    MCH 34.8 (H) 26.0 - 34.0 pg    MCHC 33.3 32.0 - 36.0 g/dL    RDW 16.4 (H) 11.5 - 14.5 %    Platelets 75 (L) 150 - 450 x10*3/uL   Prepare Platelets: 1 Units   Result Value Ref Range    PRODUCT CODE Y4573U29     Unit Number [id number]     Unit ABO B     Unit RH POS     Dispense Status RE     Blood Expiration Date April 17, 2024 23:59 EDT     PRODUCT BLOOD TYPE 7300     UNIT VOLUME 253     PRODUCT CODE E5508H48     Unit Number [id number]     Unit ABO B     Unit RH POS     Dispense Status IS     Blood Expiration Date April 15, 2024 23:59 EDT     PRODUCT BLOOD TYPE 7300     UNIT VOLUME 257    CBC   Result Value Ref Range    WBC 8.3 4.4 - 11.3 x10*3/uL    nRBC 0.4 (H) 0.0 - 0.0 /100 WBCs    RBC 2.08 (L) 4.50 - 5.90 x10*6/uL    Hemoglobin 6.9 (L) 13.5 - 17.5 g/dL    Hematocrit 21.6 (L) 41.0 - 52.0 %     (H) 80 - 100 fL    MCH 33.2 26.0 - 34.0 pg    MCHC 31.9 (L) 32.0 - 36.0 g/dL    RDW 16.7 (H) 11.5 - 14.5 %    Platelets 80 (L) 150 - 450 x10*3/uL   POCT GLUCOSE   Result Value Ref Range    POCT Glucose 141 (H) 74 - 99 mg/dL   Prepare RBC: 1 Units   Result Value Ref Range    PRODUCT CODE G6194B74     Unit Number [id number]     Unit ABO B     Unit RH POS     XM INTEP COMP     Dispense Status IS     Blood Expiration Date April 30, 2024 23:59 EDT     PRODUCT BLOOD TYPE 7300     UNIT VOLUME 350    CBC and Auto Differential   Result Value Ref Range    WBC 8.6 4.4 - 11.3 x10*3/uL    nRBC 0.7 (H) 0.0 - 0.0 /100 WBCs    RBC 2.55 (L) 4.50 - 5.90 x10*6/uL    Hemoglobin 8.7 (L) 13.5 - 17.5 g/dL    Hematocrit 26.0 (L) 41.0 - 52.0 %     (H) 80 - 100 fL    MCH 34.1 (H) 26.0 - 34.0 pg    MCHC 33.5  32.0 - 36.0 g/dL    RDW 17.3 (H) 11.5 - 14.5 %    Platelets 85 (L) 150 - 450 x10*3/uL    Neutrophils % 71.7 40.0 - 80.0 %    Immature Granulocytes %, Automated 2.3 (H) 0.0 - 0.9 %    Lymphocytes % 9.9 13.0 - 44.0 %    Monocytes % 11.3 2.0 - 10.0 %    Eosinophils % 4.3 0.0 - 6.0 %    Basophils % 0.5 0.0 - 2.0 %    Neutrophils Absolute 6.12 1.20 - 7.70 x10*3/uL    Immature Granulocytes Absolute, Automated 0.20 0.00 - 0.70 x10*3/uL    Lymphocytes Absolute 0.85 (L) 1.20 - 4.80 x10*3/uL    Monocytes Absolute 0.97 0.10 - 1.00 x10*3/uL    Eosinophils Absolute 0.37 0.00 - 0.70 x10*3/uL    Basophils Absolute 0.04 0.00 - 0.10 x10*3/uL   Protime-INR   Result Value Ref Range    Protime 16.4 (H) 9.8 - 12.8 seconds    INR 1.5 (H) 0.9 - 1.1          Assessment/Plan   Active Problems:    Atrial flutter, unspecified type (Multi)  Right SDH; stable on repeat CT Head 04/14/2024. Recommend:  - Platelet goal greater than 75  - INR goal less than 1.6  - Okay for DVT prophylaxis if necessary  - Any full dose anticoagulation would involve treatment discussion with cardiology and weighing of risks and benefits  - Recommend seizure prophylaxis with Keppra 500 mg twice daily IV/p.o.         I spent >25 minutes in the professional and overall care of this patient.      Tresa Gagnon, APRN-CNP

## 2024-04-16 LAB
ALBUMIN SERPL BCP-MCNC: 2.6 G/DL (ref 3.4–5)
ALP SERPL-CCNC: 134 U/L (ref 33–136)
ALT SERPL W P-5'-P-CCNC: 37 U/L (ref 10–52)
ANION GAP SERPL CALC-SCNC: 10 MMOL/L (ref 10–20)
AST SERPL W P-5'-P-CCNC: 76 U/L (ref 9–39)
BASOPHILS # BLD AUTO: 0.05 X10*3/UL (ref 0–0.1)
BASOPHILS NFR BLD AUTO: 0.6 %
BILIRUB SERPL-MCNC: 5.2 MG/DL (ref 0–1.2)
BUN SERPL-MCNC: 6 MG/DL (ref 6–23)
CALCIUM SERPL-MCNC: 8.4 MG/DL (ref 8.6–10.3)
CHLORIDE SERPL-SCNC: 106 MMOL/L (ref 98–107)
CO2 SERPL-SCNC: 26 MMOL/L (ref 21–32)
CREAT SERPL-MCNC: 0.8 MG/DL (ref 0.5–1.3)
EGFRCR SERPLBLD CKD-EPI 2021: >90 ML/MIN/1.73M*2
EOSINOPHIL # BLD AUTO: 0.47 X10*3/UL (ref 0–0.7)
EOSINOPHIL NFR BLD AUTO: 6 %
ERYTHROCYTE [DISTWIDTH] IN BLOOD BY AUTOMATED COUNT: 18.9 % (ref 11.5–14.5)
ERYTHROCYTE [DISTWIDTH] IN BLOOD BY AUTOMATED COUNT: 19.1 % (ref 11.5–14.5)
GLUCOSE BLD MANUAL STRIP-MCNC: 119 MG/DL (ref 74–99)
GLUCOSE BLD MANUAL STRIP-MCNC: 123 MG/DL (ref 74–99)
GLUCOSE BLD MANUAL STRIP-MCNC: 135 MG/DL (ref 74–99)
GLUCOSE BLD MANUAL STRIP-MCNC: 138 MG/DL (ref 74–99)
GLUCOSE BLD MANUAL STRIP-MCNC: 166 MG/DL (ref 74–99)
GLUCOSE SERPL-MCNC: 138 MG/DL (ref 74–99)
HCT VFR BLD AUTO: 26.9 % (ref 41–52)
HCT VFR BLD AUTO: 27.8 % (ref 41–52)
HGB BLD-MCNC: 8.9 G/DL (ref 13.5–17.5)
HGB BLD-MCNC: 9.2 G/DL (ref 13.5–17.5)
IMM GRANULOCYTES # BLD AUTO: 0.31 X10*3/UL (ref 0–0.7)
IMM GRANULOCYTES NFR BLD AUTO: 3.9 % (ref 0–0.9)
LYMPHOCYTES # BLD AUTO: 0.92 X10*3/UL (ref 1.2–4.8)
LYMPHOCYTES NFR BLD AUTO: 11.7 %
MAGNESIUM SERPL-MCNC: 1.86 MG/DL (ref 1.6–2.4)
MCH RBC QN AUTO: 33.7 PG (ref 26–34)
MCH RBC QN AUTO: 33.7 PG (ref 26–34)
MCHC RBC AUTO-ENTMCNC: 33.1 G/DL (ref 32–36)
MCHC RBC AUTO-ENTMCNC: 33.1 G/DL (ref 32–36)
MCV RBC AUTO: 102 FL (ref 80–100)
MCV RBC AUTO: 102 FL (ref 80–100)
MONOCYTES # BLD AUTO: 1.17 X10*3/UL (ref 0.1–1)
MONOCYTES NFR BLD AUTO: 14.9 %
NEUTROPHILS # BLD AUTO: 4.94 X10*3/UL (ref 1.2–7.7)
NEUTROPHILS NFR BLD AUTO: 62.9 %
NRBC BLD-RTO: 0.4 /100 WBCS (ref 0–0)
NRBC BLD-RTO: 0.6 /100 WBCS (ref 0–0)
PHOSPHATE SERPL-MCNC: 3 MG/DL (ref 2.5–4.9)
PLATELET # BLD AUTO: 91 X10*3/UL (ref 150–450)
PLATELET # BLD AUTO: 93 X10*3/UL (ref 150–450)
POTASSIUM SERPL-SCNC: 3.8 MMOL/L (ref 3.5–5.3)
PROT SERPL-MCNC: 5.6 G/DL (ref 6.4–8.2)
RBC # BLD AUTO: 2.64 X10*6/UL (ref 4.5–5.9)
RBC # BLD AUTO: 2.73 X10*6/UL (ref 4.5–5.9)
SODIUM SERPL-SCNC: 138 MMOL/L (ref 136–145)
VANCOMYCIN SERPL-MCNC: 20.5 UG/ML (ref 5–20)
WBC # BLD AUTO: 7.5 X10*3/UL (ref 4.4–11.3)
WBC # BLD AUTO: 7.9 X10*3/UL (ref 4.4–11.3)

## 2024-04-16 PROCEDURE — 36415 COLL VENOUS BLD VENIPUNCTURE: CPT | Performed by: INTERNAL MEDICINE

## 2024-04-16 PROCEDURE — 2500000004 HC RX 250 GENERAL PHARMACY W/ HCPCS (ALT 636 FOR OP/ED): Performed by: INTERNAL MEDICINE

## 2024-04-16 PROCEDURE — 2500000004 HC RX 250 GENERAL PHARMACY W/ HCPCS (ALT 636 FOR OP/ED)

## 2024-04-16 PROCEDURE — 92610 EVALUATE SWALLOWING FUNCTION: CPT | Mod: GN

## 2024-04-16 PROCEDURE — 99233 SBSQ HOSP IP/OBS HIGH 50: CPT | Performed by: INTERNAL MEDICINE

## 2024-04-16 PROCEDURE — 83735 ASSAY OF MAGNESIUM: CPT

## 2024-04-16 PROCEDURE — 80053 COMPREHEN METABOLIC PANEL: CPT

## 2024-04-16 PROCEDURE — 82947 ASSAY GLUCOSE BLOOD QUANT: CPT

## 2024-04-16 PROCEDURE — 2500000001 HC RX 250 WO HCPCS SELF ADMINISTERED DRUGS (ALT 637 FOR MEDICARE OP)

## 2024-04-16 PROCEDURE — 97161 PT EVAL LOW COMPLEX 20 MIN: CPT | Mod: GP

## 2024-04-16 PROCEDURE — 2500000006 HC RX 250 W HCPCS SELF ADMINISTERED DRUGS (ALT 637 FOR ALL PAYERS): Performed by: INTERNAL MEDICINE

## 2024-04-16 PROCEDURE — 99291 CRITICAL CARE FIRST HOUR: CPT

## 2024-04-16 PROCEDURE — 2500000001 HC RX 250 WO HCPCS SELF ADMINISTERED DRUGS (ALT 637 FOR MEDICARE OP): Performed by: INTERNAL MEDICINE

## 2024-04-16 PROCEDURE — 99231 SBSQ HOSP IP/OBS SF/LOW 25: CPT | Performed by: NURSE PRACTITIONER

## 2024-04-16 PROCEDURE — 85025 COMPLETE CBC W/AUTO DIFF WBC: CPT

## 2024-04-16 PROCEDURE — 97165 OT EVAL LOW COMPLEX 30 MIN: CPT | Mod: GO

## 2024-04-16 PROCEDURE — C9113 INJ PANTOPRAZOLE SODIUM, VIA: HCPCS

## 2024-04-16 PROCEDURE — 2500000005 HC RX 250 GENERAL PHARMACY W/O HCPCS: Performed by: INTERNAL MEDICINE

## 2024-04-16 PROCEDURE — 92526 ORAL FUNCTION THERAPY: CPT | Mod: GN

## 2024-04-16 PROCEDURE — 36415 COLL VENOUS BLD VENIPUNCTURE: CPT

## 2024-04-16 PROCEDURE — 84100 ASSAY OF PHOSPHORUS: CPT

## 2024-04-16 PROCEDURE — 2020000001 HC ICU ROOM DAILY

## 2024-04-16 PROCEDURE — 85027 COMPLETE CBC AUTOMATED: CPT

## 2024-04-16 PROCEDURE — 80202 ASSAY OF VANCOMYCIN: CPT | Performed by: INTERNAL MEDICINE

## 2024-04-16 RX ORDER — MAGNESIUM SULFATE HEPTAHYDRATE 40 MG/ML
2 INJECTION, SOLUTION INTRAVENOUS ONCE
Status: COMPLETED | OUTPATIENT
Start: 2024-04-16 | End: 2024-04-16

## 2024-04-16 RX ORDER — PANTOPRAZOLE SODIUM 40 MG/10ML
40 INJECTION, POWDER, LYOPHILIZED, FOR SOLUTION INTRAVENOUS DAILY
Status: DISCONTINUED | OUTPATIENT
Start: 2024-04-16 | End: 2024-04-18

## 2024-04-16 RX ORDER — CEFEPIME 1 G/50ML
2 INJECTION, SOLUTION INTRAVENOUS EVERY 8 HOURS
Status: DISCONTINUED | OUTPATIENT
Start: 2024-04-16 | End: 2024-04-16

## 2024-04-16 RX ORDER — POTASSIUM CHLORIDE 1.5 G/1.58G
20 POWDER, FOR SOLUTION ORAL ONCE
Status: COMPLETED | OUTPATIENT
Start: 2024-04-16 | End: 2024-04-16

## 2024-04-16 RX ADMIN — LEVETIRACETAM 500 MG: 5 INJECTION INTRAVENOUS at 13:31

## 2024-04-16 RX ADMIN — TAMSULOSIN HYDROCHLORIDE 0.4 MG: 0.4 CAPSULE ORAL at 11:34

## 2024-04-16 RX ADMIN — Medication 1 TABLET: at 11:34

## 2024-04-16 RX ADMIN — VANCOMYCIN HYDROCHLORIDE 1250 MG: 1.25 INJECTION, POWDER, LYOPHILIZED, FOR SOLUTION INTRAVENOUS at 02:17

## 2024-04-16 RX ADMIN — PANTOPRAZOLE SODIUM 40 MG: 40 INJECTION, POWDER, FOR SOLUTION INTRAVENOUS at 00:07

## 2024-04-16 RX ADMIN — METOPROLOL TARTRATE 25 MG: 25 TABLET, FILM COATED ORAL at 20:04

## 2024-04-16 RX ADMIN — THIAMINE HYDROCHLORIDE 200 MG: 100 INJECTION, SOLUTION INTRAMUSCULAR; INTRAVENOUS at 09:48

## 2024-04-16 RX ADMIN — MAGNESIUM SULFATE HEPTAHYDRATE 2 G: 40 INJECTION, SOLUTION INTRAVENOUS at 09:48

## 2024-04-16 RX ADMIN — Medication 25 MCG/HR: at 00:07

## 2024-04-16 RX ADMIN — LEVETIRACETAM 500 MG: 5 INJECTION INTRAVENOUS at 00:13

## 2024-04-16 RX ADMIN — CEFEPIME 2 G: 1 INJECTION, SOLUTION INTRAVENOUS at 09:48

## 2024-04-16 RX ADMIN — Medication: at 20:00

## 2024-04-16 RX ADMIN — SODIUM CHLORIDE, SODIUM LACTATE, POTASSIUM CHLORIDE, CALCIUM CHLORIDE AND DEXTROSE MONOHYDRATE 125 ML/HR: 5; 600; 310; 30; 20 INJECTION, SOLUTION INTRAVENOUS at 09:49

## 2024-04-16 RX ADMIN — FOLIC ACID 1 MG: 1 TABLET ORAL at 11:34

## 2024-04-16 RX ADMIN — LACTULOSE 20 G: 20 SOLUTION ORAL at 20:04

## 2024-04-16 RX ADMIN — SODIUM CHLORIDE, SODIUM LACTATE, POTASSIUM CHLORIDE, CALCIUM CHLORIDE AND DEXTROSE MONOHYDRATE 125 ML/HR: 5; 600; 310; 30; 20 INJECTION, SOLUTION INTRAVENOUS at 20:06

## 2024-04-16 RX ADMIN — SODIUM CHLORIDE, SODIUM LACTATE, POTASSIUM CHLORIDE, CALCIUM CHLORIDE AND DEXTROSE MONOHYDRATE 125 ML/HR: 5; 600; 310; 30; 20 INJECTION, SOLUTION INTRAVENOUS at 00:07

## 2024-04-16 RX ADMIN — METOPROLOL TARTRATE 25 MG: 25 TABLET, FILM COATED ORAL at 11:34

## 2024-04-16 RX ADMIN — Medication 4 L/MIN: at 08:00

## 2024-04-16 RX ADMIN — POTASSIUM CHLORIDE 20 MEQ: 1.5 POWDER, FOR SOLUTION ORAL at 11:33

## 2024-04-16 RX ADMIN — PANTOPRAZOLE SODIUM 40 MG: 40 INJECTION, POWDER, FOR SOLUTION INTRAVENOUS at 16:02

## 2024-04-16 RX ADMIN — CEFEPIME 500 MG: 1 INJECTION, POWDER, FOR SOLUTION INTRAMUSCULAR; INTRAVENOUS at 01:16

## 2024-04-16 RX ADMIN — LACTULOSE 20 G: 20 SOLUTION ORAL at 16:02

## 2024-04-16 ASSESSMENT — PAIN - FUNCTIONAL ASSESSMENT
PAIN_FUNCTIONAL_ASSESSMENT: 0-10

## 2024-04-16 ASSESSMENT — COGNITIVE AND FUNCTIONAL STATUS - GENERAL
TURNING FROM BACK TO SIDE WHILE IN FLAT BAD: A LOT
PERSONAL GROOMING: A LOT
WALKING IN HOSPITAL ROOM: TOTAL
STANDING UP FROM CHAIR USING ARMS: A LOT
EATING MEALS: A LITTLE
DRESSING REGULAR LOWER BODY CLOTHING: TOTAL
TOILETING: TOTAL
MOVING TO AND FROM BED TO CHAIR: TOTAL
MOBILITY SCORE: 9
HELP NEEDED FOR BATHING: TOTAL
DRESSING REGULAR UPPER BODY CLOTHING: A LOT
MOVING FROM LYING ON BACK TO SITTING ON SIDE OF FLAT BED WITH BEDRAILS: A LOT
CLIMB 3 TO 5 STEPS WITH RAILING: TOTAL
DAILY ACTIVITIY SCORE: 10

## 2024-04-16 ASSESSMENT — PAIN SCALES - GENERAL
PAINLEVEL_OUTOF10: 0 - NO PAIN

## 2024-04-16 NOTE — CARE PLAN
Problem: Pain  Goal: My pain/discomfort is manageable  4/16/2024 0938 by Sangita Desouza RN  Outcome: Progressing  4/16/2024 0936 by Sangita Desouza RN  Outcome: Progressing     Problem: Safety  Goal: Patient will be injury free during hospitalization  4/16/2024 0938 by Sangita Desouza RN  Outcome: Progressing  4/16/2024 0936 by Sangita Desouza RN  Outcome: Progressing  Goal: I will remain free of falls  4/16/2024 0938 by Sangita Desouza RN  Outcome: Progressing  4/16/2024 0936 by Sangita Desouza RN  Outcome: Progressing     Problem: Daily Care  Goal: Daily care needs are met  4/16/2024 0938 by Sangita Desouza RN  Outcome: Progressing  4/16/2024 0936 by Sangita Desouza RN  Outcome: Progressing     Problem: Psychosocial Needs  Goal: Demonstrates ability to cope with hospitalization/illness  4/16/2024 0938 by Sangita Desouza RN  Outcome: Progressing  4/16/2024 0936 by Sangita Desouza RN  Outcome: Progressing  Goal: Collaborate with me, my family, and caregiver to identify my specific goals  4/16/2024 0938 by Sangita Desouza RN  Outcome: Progressing  4/16/2024 0936 by Sangita Desouza RN  Outcome: Progressing     Problem: Discharge Barriers  Goal: My discharge needs are met  4/16/2024 0938 by Sangita Desouza RN  Outcome: Progressing  4/16/2024 0936 by Sangita Desouza RN  Outcome: Progressing     Problem: Arrythmia/Dysrhythmia  Goal: Lab values return to normal range  4/16/2024 0938 by Sangita Desouza RN  Outcome: Progressing  4/16/2024 0936 by Sangita Desouza RN  Outcome: Progressing  Goal: No evidence of post procedure complications  4/16/2024 0938 by Sangita Desouza RN  Outcome: Progressing  4/16/2024 0936 by Sangita Desouza RN  Outcome: Progressing  Goal: Promote self management  4/16/2024 0938 by Sangita Desouza RN  Outcome: Progressing  4/16/2024 0936 by Sangita Desouza RN  Outcome: Progressing  Goal: Serial ECG will return to baseline  4/16/2024 0938 by Sangita Desouza RN  Outcome: Progressing  4/16/2024 0936 by Sangita Desouza  RN  Outcome: Progressing  Goal: Verbalize understanding of procedures/devices  4/16/2024 0938 by Sangita Desouza RN  Outcome: Progressing  4/16/2024 0936 by Sangita Desouza RN  Outcome: Progressing  Goal: Vital signs return to baseline  4/16/2024 0938 by Sangita Desouza RN  Outcome: Progressing  4/16/2024 0936 by Sangita Desouza RN  Outcome: Progressing  Goal: Care and maintenance of device (specify)  4/16/2024 0938 by Sangita Desouza RN  Outcome: Progressing  4/16/2024 0936 by Sangita Desouza RN  Outcome: Progressing     Problem: Respiratory  Goal: Clear secretions with interventions this shift  4/16/2024 0938 by Sangita Desouza RN  Outcome: Progressing  4/16/2024 0936 by Sangita Desouza RN  Outcome: Progressing  Goal: Minimize anxiety/maximize coping throughout shift  4/16/2024 0938 by Sangita Desouza RN  Outcome: Progressing  4/16/2024 0936 by Sangita Desouza RN  Outcome: Progressing  Goal: Minimal/no exertional discomfort or dyspnea this shift  4/16/2024 0938 by Sangita Desouza RN  Outcome: Progressing  4/16/2024 0936 by Sangita Desouza RN  Outcome: Progressing  Goal: No signs of respiratory distress (eg. Use of accessory muscles. Peds grunting)  4/16/2024 0938 by Sangita Desouza RN  Outcome: Progressing  4/16/2024 0936 by Sangita Desouza RN  Outcome: Progressing  Goal: Patent airway maintained this shift  4/16/2024 0938 by Sangita Desouza RN  Outcome: Progressing  4/16/2024 0936 by Sangita Desouza RN  Outcome: Progressing  Goal: Tolerate mechanical ventilation evidenced by VS/agitation level this shift  4/16/2024 0938 by Sangita Desouza RN  Outcome: Progressing  4/16/2024 0936 by Sangita Desouza RN  Outcome: Progressing  Goal: Tolerate pulmonary toileting this shift  4/16/2024 0938 by Sangita Desouza RN  Outcome: Progressing  4/16/2024 0936 by Sangita Desouza RN  Outcome: Progressing  Goal: Verbalize decreased shortness of breath this shift  4/16/2024 0938 by Sangita Desouza RN  Outcome: Progressing  4/16/2024 0936 by Sangita  BRENNA Desouza  Outcome: Progressing  Goal: Wean oxygen to maintain O2 saturation per order/standard this shift  4/16/2024 0938 by Sangita Desouza RN  Outcome: Progressing  4/16/2024 0936 by Sangita Desouza RN  Outcome: Progressing  Goal: Increase self care and/or family involvement in next 24 hours  4/16/2024 0938 by Sangita Desouza RN  Outcome: Progressing  4/16/2024 0936 by Sangita Desouza RN  Outcome: Progressing     Problem: Pain - Adult  Goal: Verbalizes/displays adequate comfort level or baseline comfort level  4/16/2024 0938 by Sangita Desouza RN  Outcome: Progressing  4/16/2024 0936 by Sangita Desouza RN  Outcome: Progressing     Problem: Safety - Adult  Goal: Free from fall injury  4/16/2024 0938 by Sangita Desouza RN  Outcome: Progressing  4/16/2024 0936 by Sangita Desouza RN  Outcome: Progressing     Problem: Discharge Planning  Goal: Discharge to home or other facility with appropriate resources  4/16/2024 0938 by Sangita Desouza RN  Outcome: Progressing  4/16/2024 0936 by Sangita Desouza RN  Outcome: Progressing     Problem: Chronic Conditions and Co-morbidities  Goal: Patient's chronic conditions and co-morbidity symptoms are monitored and maintained or improved  4/16/2024 0938 by Sangita Desouza RN  Outcome: Progressing  4/16/2024 0936 by Sangita Desouza RN  Outcome: Progressing     Problem: Skin  Goal: Decreased wound size/increased tissue granulation at next dressing change  4/16/2024 0938 by Sangita Desouza RN  Outcome: Progressing  Flowsheets (Taken 4/15/2024 0847)  Decreased wound size/increased tissue granulation at next dressing change: Protective dressings over bony prominences  4/16/2024 0936 by Sangita Desouza RN  Outcome: Progressing  Goal: Participates in plan/prevention/treatment measures  4/16/2024 0938 by Sangita Desouza RN  Outcome: Progressing  Flowsheets (Taken 4/15/2024 0847)  Participates in plan/prevention/treatment measures: Discuss with provider PT/OT consult  4/16/2024 0936 by Sangita Desouza  RN  Outcome: Progressing  Goal: Prevent/manage excess moisture  4/16/2024 0938 by Sangita Desouza RN  Outcome: Progressing  Flowsheets (Taken 4/15/2024 0847)  Prevent/manage excess moisture: Cleanse incontinence/protect with barrier cream  4/16/2024 0936 by Sangita Desouza RN  Outcome: Progressing  Goal: Prevent/minimize sheer/friction injuries  4/16/2024 0938 by Sangita Desouza RN  Outcome: Progressing  Flowsheets (Taken 4/15/2024 0847)  Prevent/minimize sheer/friction injuries: Turn/reposition every 2 hours/use positioning/transfer devices  4/16/2024 0936 by Sangita Desouza RN  Outcome: Progressing  Goal: Promote/optimize nutrition  4/16/2024 0938 by Sangita Desouza RN  Outcome: Progressing  Flowsheets (Taken 4/15/2024 0847)  Promote/optimize nutrition: Monitor/record intake including meals  4/16/2024 0936 by Sangita Desouza RN  Outcome: Progressing  Goal: Promote skin healing  4/16/2024 0938 by Sangita Desouza RN  Outcome: Progressing  Flowsheets (Taken 4/15/2024 0847)  Promote skin healing: Turn/reposition every 2 hours/use positioning/transfer devices  4/16/2024 0936 by Sangita Desouza RN  Outcome: Progressing     Problem: Fall/Injury  Goal: Not fall by end of shift  4/16/2024 0938 by Sangita Desouza RN  Outcome: Progressing  4/16/2024 0936 by Sangita Desouza RN  Outcome: Progressing  Goal: Be free from injury by end of the shift  4/16/2024 0938 by Sangita Desouza RN  Outcome: Progressing  4/16/2024 0936 by Sangita Desouza RN  Outcome: Progressing  Goal: Verbalize understanding of personal risk factors for fall in the hospital  4/16/2024 0938 by Sangita Desouza RN  Outcome: Progressing  4/16/2024 0936 by Sangita Desouza RN  Outcome: Progressing  Goal: Verbalize understanding of risk factor reduction measures to prevent injury from fall in the home  4/16/2024 0938 by Sangita Desouza RN  Outcome: Progressing  4/16/2024 0936 by Sangita Desouza RN  Outcome: Progressing  Goal: Use assistive devices by end of the shift  4/16/2024  0938 by Sangita Desouza RN  Outcome: Progressing  4/16/2024 0936 by Sangita Desouza RN  Outcome: Progressing  Goal: Pace activities to prevent fatigue by end of the shift  4/16/2024 0938 by Sangita Desouza RN  Outcome: Progressing  4/16/2024 0936 by Sangita Desouza RN  Outcome: Progressing     Problem: Pain  Goal: Takes deep breaths with improved pain control throughout the shift  4/16/2024 0938 by Sangita Desouza RN  Outcome: Progressing  4/16/2024 0936 by Sangita Desouza RN  Outcome: Progressing  Goal: Turns in bed with improved pain control throughout the shift  4/16/2024 0938 by Sangita Desouza RN  Outcome: Progressing  4/16/2024 0936 by Sangita Desouza RN  Outcome: Progressing  Goal: Walks with improved pain control throughout the shift  4/16/2024 0938 by Sangita Desouza RN  Outcome: Progressing  4/16/2024 0936 by Sangita Desouza RN  Outcome: Progressing  Goal: Performs ADL's with improved pain control throughout shift  4/16/2024 0938 by Sangita Desouza RN  Outcome: Progressing  4/16/2024 0936 by Sangita Desouza RN  Outcome: Progressing  Goal: Participates in PT with improved pain control throughout the shift  4/16/2024 0938 by Sangita Desouza RN  Outcome: Progressing  4/16/2024 0936 by Sangita Desouza RN  Outcome: Progressing  Goal: Free from opioid side effects throughout the shift  4/16/2024 0938 by Sangita Desouza RN  Outcome: Progressing  4/16/2024 0936 by Sangita Desouza RN  Outcome: Progressing  Goal: Free from acute confusion related to pain meds throughout the shift  4/16/2024 0938 by Sangita Desouza RN  Outcome: Progressing  4/16/2024 0936 by Sangita Desouza RN  Outcome: Progressing   The patient's goals for the shift include      The clinical goals for the shift include pt will remain hemodynamically stable

## 2024-04-16 NOTE — PROGRESS NOTES
Occupational Therapy    Evaluation    Patient Name: Trey Borjas  MRN: [id number]  Today's Date: 4/16/2024  Time Calculation  Start Time: 0947  Stop Time: 1015  Time Calculation (min): 28 min        Assessment:  End of Session Communication: Bedside nurse  End of Session Patient Position: Bed, 4 rail up, Alarm on     Plan:  Treatment Interventions: ADL retraining, Functional transfer training, UE strengthening/ROM, Endurance training, Compensatory technique education  OT Frequency: 3 times per week  OT Discharge Recommendations: Moderate intensity level of continued care  OT - OK to Discharge: Yes (to next level of care when cleared by medical team)  Treatment Interventions: ADL retraining, Functional transfer training, UE strengthening/ROM, Endurance training, Compensatory technique education    Increased time required for skilled line/ICU equipment management and room setup to ensure pt safety during mobility in ICU setting. Skilled monitoring of vital signs and symptoms continuously throughout session to ensure hemodynamic stability and physiological responses to activity with progression of session. Provided continuous verbal/tactile cues for activity pacing and initiation of therapeutic rest breaks to recover from exertion.     Subjective   Current Problem:  1. Atrial flutter, unspecified type (Multi)  Transthoracic Echo (TTE) Complete    Transthoracic Echo (TTE) Complete      2. Alcohol abuse        3. Hypokalemia        4. High anion gap metabolic acidosis        5. Elevated liver enzymes        6. Other polyneuropathy  gabapentin (Neurontin) 100 mg capsule      7. Hematemesis, unspecified whether nausea present  EGD    EGD    EGD      8. Esophagitis  EGD        General:  General  Reason for Referral: impaired adl  Referred By: Elis  Past Medical History Relevant to Rehab: pt. admitted with a flutter, mild metabolic acidosis, etoh, cirrhosis.  pt. in withdrawal, had code blue 4/12, unresponsive,  intubated, v tach, head ct:  SDH posterior R cerebral hemisphere, small hemorrhage R lateral aspect posterior falx, repeat head ct stable 4/13 and 4/14, blood via NG tube, transfused with goal hgb 7, neurosurgery:  no surgery, maintain sbp<140, GI consult  4/15 EGD no active bleeding, hold off on colonoscopy  pmh:  htn, gout, etoh abuse, smoking, complete heart block s/p pacemaker, neuropathy bilateral feet  Missed Visit: Yes  Missed Visit Reason:  (pt. on  hold, per RN, EGD pending this date due to bloody stools)  Family/Caregiver Present: Yes  Caregiver Feedback: father and sister present  Prior to Session Communication: Bedside nurse  Patient Position Received: Bed, 4 rail up, Alarm off, not on at start of session  General Comment: pt. resting in bed, drowsy but arousable, agreeable to therapy intervention  Precautions:  Precautions Comment: telemetry, iv fluids, o2 4 L/min  Vital Signs:  Heart Rate:  (102)  SpO2:  (90-93% with cues for deep breathing)  BP:  (109/55 supine, 127/83 standing)  Pain:  Pain Assessment  Pain Assessment:  (no pain complaints)    Objective   Cognition:  Overall Cognitive Status:  (eyes closed for most of session, intermittently opens eyes to command, follows commands, a&o x 3)           Home Living:  Home Living Comments: pt. lives with brother, 1 floor condo, however has 14 step entry with rail, wh. walker, st. walker, st. cane, gb, stall shower, seat, hhs, RTS  Prior Function:  Prior Function Comments: pt. 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 requires bue support on walker to maintain standing balance  Activity Tolerance:  Early Mobility/Exercise Safety Screen: Proceed with mobilization - No exclusion criteria met  Bed Mobility/Transfers: Bed Mobility  Bed Mobility:  (max assist x 2 supine <> sit,  use of draw sheet)    Transfers  Transfer:  (sit<> stand from eob required mod assist x 2 with cues for hand placement)      Functional Mobility:  Functional Mobility  Functional Mobility Performed:  (sidestepping with wh. walker, mod assist x 2 with max visual/verbal cues)  Sensation:  Sensation Comment: sensation intact bue's  Strength:  Strength Comments: R:  4-/5  elbow, L:  4/5  elbow     Coordination:  Coordination Comment: decreased accuracy/rate R vs. L finger to nose     Outcome Measures:[facility] Daily Activity  Putting on and taking off regular lower body clothing: Total  Bathing (including washing, rinsing, drying): Total  Putting on and taking off regular upper body clothing: A lot  Toileting, which includes using toilet, bedpan or urinal: Total  Taking care of personal grooming such as brushing teeth: A lot  Eating Meals: A little  Daily Activity - Total Score: 10        , Confusion Assessment Method-ICU (CAM-ICU)  Feature 1: Acute Onset or Fluctuating Course: Negative  Feature 2: Inattention: Negative  Feature 4: Disorganized Thinking: Negative  Overall CAM-ICU: Negative  , ICU Mobility Screen  Early Mobility/Exercise Safety Screen: Proceed with mobilization - No exclusion criteria met, and E = Exercise and Early Mobility  Early Mobility/Exercise Safety Screen: Proceed with mobilization - No exclusion criteria met  Current Activity: Standing    Education Documentation  Body Mechanics, taught by Mckayla Romo OT at 4/16/2024  1:47 PM.  Learner: Patient  Readiness: Acceptance  Method: Explanation  Response: Verbalizes Understanding, Needs Reinforcement    ADL Training, taught by Mckayla Romo OT at 4/16/2024  1:47 PM.  Learner: Patient  Readiness: Acceptance  Method: Explanation  Response: Verbalizes Understanding, Needs Reinforcement    Education Comments  No comments found.        OP EDUCATION:       Goals:  Encounter Problems       Encounter Problems (Active)       OT Goals       increase bue  ther ex/activity x 7-10 minutes and increase standing tolerance x 3-5 minutes with cga to promote greater activity tolerance for assist with adl.   (Progressing)       Start:  04/16/24    Expected End:  04/30/24            Increase functional mobility and  functional transfers to cga for bed/chair/toilet with dme prn   (Progressing)       Start:  04/16/24    Expected End:  04/30/24            Increase grooming/ub bathing to cga with dme prn  (Progressing)       Start:  04/16/24    Expected End:  04/30/24              Pt will maintain attention >8 min with min cueing for active participation in functional tasks in preparation for ADLs and continue to be cam (-) during hospital stay (Progressing)       Start:  04/16/24    Expected End:  04/30/24            Increase toileting to cga with dme prn  (Progressing)       Start:  04/16/24    Expected End:  04/30/24

## 2024-04-16 NOTE — PROGRESS NOTES
"Trey Borjas is a 64 y.o. male on day 4 of admission presenting with Atrial flutter, unspecified type (Multi).    Subjective   Extubated on 04/15/2024       Objective     Physical Exam    Last Recorded Vitals  Blood pressure 135/78, pulse 91, temperature 37.2 °C (99 °F), temperature source Temporal, resp. rate 15, height 1.829 m (6' 0.01\"), weight 98.6 kg (217 lb 6 oz), SpO2 93%.  Intake/Output last 3 Shifts:  I/O last 3 completed shifts:  In: 3378.3 (34.3 mL/kg) [I.V.:1431.3 (14.5 mL/kg); Blood:757; NG/GT:240; IV Piggyback:950]  Out: 1958 (19.9 mL/kg) [Urine:1758 (0.5 mL/kg/hr); Emesis/NG output:200]  Weight: 98.6 kg     Relevant Results  Results for orders placed or performed during the hospital encounter of 04/10/24 (from the past 24 hour(s))   CBC   Result Value Ref Range    WBC 9.6 4.4 - 11.3 x10*3/uL    nRBC 0.7 (H) 0.0 - 0.0 /100 WBCs    RBC 2.62 (L) 4.50 - 5.90 x10*6/uL    Hemoglobin 8.9 (L) 13.5 - 17.5 g/dL    Hematocrit 27.0 (L) 41.0 - 52.0 %     (H) 80 - 100 fL    MCH 34.0 26.0 - 34.0 pg    MCHC 33.0 32.0 - 36.0 g/dL    RDW 18.6 (H) 11.5 - 14.5 %    Platelets 96 (L) 150 - 450 x10*3/uL   POCT GLUCOSE   Result Value Ref Range    POCT Glucose 128 (H) 74 - 99 mg/dL   SST TOP   Result Value Ref Range    Extra Tube Hold for add-ons.    Protime-INR   Result Value Ref Range    Protime 16.1 (H) 9.8 - 12.8 seconds    INR 1.4 (H) 0.9 - 1.1   CBC   Result Value Ref Range    WBC 8.6 4.4 - 11.3 x10*3/uL    nRBC 0.6 (H) 0.0 - 0.0 /100 WBCs    RBC 2.68 (L) 4.50 - 5.90 x10*6/uL    Hemoglobin 9.1 (L) 13.5 - 17.5 g/dL    Hematocrit 27.4 (L) 41.0 - 52.0 %     (H) 80 - 100 fL    MCH 34.0 26.0 - 34.0 pg    MCHC 33.2 32.0 - 36.0 g/dL    RDW 19.0 (H) 11.5 - 14.5 %    Platelets 94 (L) 150 - 450 x10*3/uL           Assessment/Plan   Active Problems:    Atrial flutter, unspecified type (Multi)  Right SDH; stable on repeat CT Head 04/14/2024. Recommend:  - Platelet goal greater than 75  - INR goal less than 1.6  - " Okay for DVT prophylaxis if necessary  - Any full dose anticoagulation would involve treatment discussion with cardiology and weighing of risks and benefits  - Recommend seizure prophylaxis with Keppra 500 mg twice daily IV/p.o.       I spent 10 minutes in the professional and overall care of this patient.      Tresa Gagnon, APRN-CNP

## 2024-04-16 NOTE — PROGRESS NOTES
"Critical Care Progress Note    Patient Name: Trey Borjas   YOB: 1960    Subjective:  Patient extubated yesterday. Denies any chest pain, fevers or chills. No reported bloody bowel movements overnight/this morning.     Objective:    /69   Pulse 90   Temp 36.6 °C (97.9 °F) (Temporal)   Resp 19   Ht 1.829 m (6' 0.01\")   Wt 98.6 kg (217 lb 6 oz)   SpO2 91%   BMI 29.47 kg/m²     Physical Exam:  GEN: NAD, AOx3  HEENT: PERRLA  NECK: supple, no cervical LAD, no carotid bruits  CV: not tachy  PULM: CTAB  ABD: soft, NT, ND, BS+  EXT: no LE edema  NEURO: no gross focal deficits    Assessment/Plan:  Trey Borjas is a 62yo man with past medical history of HTN, gout, neuropathy, alcohol abuse, smoking, Afib, complete heart block s/p pacemaker who was initially admitted on 4/10/23 for aflutter and monitoring for alcohol withdrawal. Code Blue was called on 4/12/24 with patient developing pVT, CPR was initiated, patient obtained ROSC within minutes of code without defibrillation. Patient was intubated during this time. Patient transferred to ICU for post-arrest care. CTH showed subdural hemorrhage in the posterior right cerebral hemisphere, on-call neurosurgeon Dr. Sahu reviewed images and recommended no role for surgery at this time. Patient also reported having blood via OG tube and PPI and octreotide gtt were initiated. Vitamin K and FFP given as INR> 1.5.    Neuro:  #SDH  #Possible Hepatic Encephalopathy  #Possible Wernicke Encephalopathy  #Metabolic Encephalopathy secondary to arrest  - Neurosurgery following, no surgical intervention at this time, CTH showed stable SDH Goal INR < 1.6 Plt > 75 Keppra 500mg BID  - Maintain SBP < 140  - Plan to discontinue fentanyl gtt today  - IV thiamine for possible wernicke, continue multivitamin, and folic acid  - Continue lactulose    Cardiovascular  #S/p Cardiac Arrest (pVtach)  #Aflutter  - Maintain K>4 and Mg > 2  - Cardiology following, echo showed " EF 60-65%, RVSP normal limits, resumed metoprolol tartrate 25mg BID  - Continue with IVF D5LR 125 ml/hr while patient is not eating    Pulmonary:  #Respiratory failure s/p cardiac arrest  - Patient extubated on 4/15, currently on 4L NC, wean as tolerated    GI:  #Concern for UGIB  #Hepatic Cirrhosis  #Transaminitis - suspect secondary to alcoholic hepatitis   #Hyperbilirubinemia  - 40mg Protonix BID  - Octreotide gtt, PPI discontinued; patient does not have UGIB or variceal bleed  - GI following, no plan for colonoscopy currently  - Continue with lactulose  - Patient transfused 1 unit pRBC 4/15 with improvement of hemoglobin (6.9 -> 8.7)  - Will get SLP evaluation to titrate feedings    Renal:   #Lactic acidosis - resolved  - Monitor urine output  - K >4 Mg >2    Endocrine:  #T2DM  A1c 8.5  - Goal blood glucose 140-180    Heme/Onc:  #Concern for blood loss anemia  #Elevated INR  #Thrombocytopenia  - Transfuse to maintain goal Plt > 75 and INR < 1.6  - Maintain T&S  - Platelets stable above goal, transfused 1 unit 4/14    ID:  #Possible CAP  CT Chest 4/12 GGO in [facility] and B[facility]  - Antibiotics discontinued today  - Follow-up cultures and PNA work-up; MRSA negative    ICU CHECK LIST:   Antimicrobials: None  Oxygen: 4L NC  Feeding: NPO in   Fluids: D5LR 125  Analgesia: Tylenol  Sedation: Precedex  Thromboprophylaxis: Held in setting of SDH and GIB  Ulcer prophylaxis: PPI  Glycemic control: BGM  Bowel care: Lactulose  Indwelling catheters: Ro   Lines: 2x 20 Ga IV, 1x 18 Ga IV    Code Status: Full Code      Bill Patterson MD  Critical Care

## 2024-04-16 NOTE — PROGRESS NOTES
This TCC met with patient at bedside to discuss discharge plan.  PT/OT recommending moderate level intensity therapy, [facility] 9/10.  Patient is agreeable to SNF.  [place] of choice explained, SNF list left with patient at bedside.  Care Transitions will continue to follow.  Asuncion Castellon RN BSN, TCC

## 2024-04-16 NOTE — PROGRESS NOTES
Speech-Language Pathology    Inpatient Clinical Swallow Evaluation    Patient Name: Trey Borjas  MRN: [id number]  Today's Date: 4/16/2024   Time Calculation  Start Time: 1055  Stop Time: 1123  Time Calculation (min): 28 min          Current Problem:  Stable R SDH found 4/13/24.   1. Atrial flutter, unspecified type (Multi)  Transthoracic Echo (TTE) Complete    Transthoracic Echo (TTE) Complete      2. Alcohol abuse        3. Hypokalemia        4. High anion gap metabolic acidosis        5. Elevated liver enzymes        6. Other polyneuropathy  gabapentin (Neurontin) 100 mg capsule      7. Hematemesis, unspecified whether nausea present  EGD    EGD    EGD      8. Esophagitis  EGD            Recommendations:  Recommendations  Additional Recommendations: Dysphagia treatment  Solid Diet Recommendations : Other (Comment) (EC7 solids)  Liquid Diet Recommendations: Thin (IDDSI Level 0) (Easy to Chew solids with Feed A.)  Compensatory Swallowing Strategies: Upright 90 degrees as possible for all oral intake, Small bites/sips, Alternate solids and liquids (Feed A)  Medication Administration Recommendations: Whole, With Pureed      Assessment:  Mild Oral Dysphagia characterized  by mildly extended slow mastication, slow oral bolus manipulation skills, and formation skills demonstrated across regular solids resulting in min oral residues.   Pt clears given a liquid thin wash and min verbal cues for full bolus recollection as pt groggy this date and weak on the R  side.    Alert, 0x4 but slow to respond.   No clinical s/sx of aspiration.    Safe for EASY to CHEW solids ( IDSSI Level 7) and thin liquids given Feed A .     SAFE SWALLOW Strategies/GUIDELINES;   Only present PO intake when awake/alert.  Supervision w/ PO intake to assure adherence to safe swallowing guidelines.   Fed A 2/2 right UE weakness.  Pills ok whole in a puree carrier.       Plan:  Plan  Inpatient/Swing Bed or Outpatient:  Inpatient  Treatment/Interventions: Other (Comment) (Compensatory strategies)  SLP Plan: Skilled SLP  SLP Frequency: 2x per week  Duration: 1 week  Discussed POC: Nursing, Patient  Discussed Risks/Benefits: Nursing    Dysphagia Goals ( established 4/16/ 24)   Pt will tolerate the LRD with adequate oral phase and no s/s of aspiration across PO.   Pt/caregiver /family will adhere to safe feeding/swallowing strategies during PO intake with > 95 % accuracy given min cues.   Tolerate advancing PO regular solid trials as grogginess improves.    Subjective   RN cleared pt for the evaluation with pt awake, groogy but can answer all basic questions with no c/o pain.  GI to see pt for blood in the stool this date but CSE approved for PO this date.     Current Problem: Metabolic  acidosis 4/12 with the CIWA protocol started.    63 y/o admit from home 4/10 for ETOH abuse -intoxication  on 4/10/24 and Atrial Flutter s/p a code blue 4/12 requiring intubation as pt became unresponsive with no pulse  from alcohol withdrawal .   Intubated 4/12 thru 4/15 when the pt was extubated in the early evening,  ST consulted to assess safe PO candidacy.     4/13/24:  Patient CT head with subdural hemorrhage in the posterior right cerebral hemisphere.  Small hemorrhage in the right lateral aspect of the posterior falx.  Spoke with the neurosurgeon Dr. Sahu who reviewed the images and recommended no role for surgery at this time.  Dx Right SDH stable on repeat CT head 4/14/24 .          pmhx: paroxysmal atrial flutter, high degree AVB s/p pacemaker in 2021, htn, neuropathy ,gout , ETOG abuse and  smoking. ETOH recent relapse  after 4 months of being sober.     EGD completed  d/t bleeding with the results as follows; mpression: noactive bleeding. 1+ single esophageal varix and severe gastritis.  The cricopharynx, upper third of the esophagus and middle third of the esophagus appeared normal.  One small, minimal and tubular varix in the  esophagus  Moderate abnormal mucosa, consistent with gastritis. Some suctin marks from n/g suction. Glucagon administred for complete relaxation and no ulcers seen. No active bleeding anywhere.  The duodenal bulb, 1st part of the duodenum and 2nd part of the duodenum appeared normal.        Findings  The cricopharynx, upper third of the esophagus and middle third of the esophagus appeared normal.  One small, minimal and tubular varix in the esophagus; no bleeding was identified  Moderate, generalized edematous, erythematous and hemorrhagic mucosa with erosion, consistent with gastritis; no bleeding was identified;  The duodenal bulb, 1st part of the duodenum and 2nd part of the duodenum appeared normal.            General Visit Information:  General Information  Reason for Referral: assess safe PO candidacy  Referred By: Lisbeth OLSON  Prior Level of Function: WFL      Objective     WBC 7.9  Pt on 4 liters of 02 via NC with SPO2 at 93 % across the encounter.   CT head completed 4/14/24 :      IMPRESSION:  Redemonstration of subdural hematoma overlying the posterior cerebral  convexity on the right and extending along the right tentorium and  the dorsal right aspect of the falx overall very similar from the  previous examination. There is minimal local mass effect but no  measurable midline shift.    CXR completed 4/12/24 :    IMPRESSION:  No focal infiltrate or pneumothorax is identified.            Baseline Assessment:  Baseline Assessment  Respiratory Status: Oxygen via nasal cannula  History of Intubation: Yes  Length of Intubations (days): 4 days  Date extubated: 04/15/24  Behavior/Cognition: Alert, Cooperative, Other (comment) (Mildy Groggy)  Patient Positioning: Upright in Bed  Baseline Vocal Quality: Normal  Volitional Cough: Strong    Pain:  Pain Assessment  Pain Assessment: 0-10  Pain Score: 0 - No pain      Oral/Motor Assessment:  Oral/Motor Assessment  Dentition: Adequate/Natural  Oral Motor: Within  Functional Limits  Facial Symmetry: Within Functional Limits  Intelligibility: Intelligible  Breath Support: Adequate for speech      Consistencies Trialed:   Ice chips. Thins via the 3 ounce x2, purees, soft and hard solids.        Clinical Observations:  Clinical Observations  Patient Positioning: Upright in Bed  Management of Oral Secretions: Adequate  Was The 3 oz Swallow Protocol Completed: Yes  Prolonged Oral Manipulation: Regular (IDDSI Level 7)  Oral Residue: Regular (IDDSI Level 7)    Clinical bedside swallow completed with all PO fed to the pt with good tolerance demonstrated and no s/s of aspiration. OME deemed unremarkable. Vocal Quality clear. Mastication and oral bolus formation skills mildly slow across regular solids.  Pt sensate to min oral residuals and can clear given verbal min cues , a liquid wash, or min extended time.     Inpatient:  Education Documentation    SLP has provided pt   and the [facility] RN  with the results and recommendations of this session.        Consultations/Referrals/Coordination of Services: n/a     Dysphagia therapy :   Worked with pt to develop strategy to clear the min oral residuals.   Pt receptive to verbal cues to clear the min oral solid residues  and tolerated the solid/liquid cyclic ingestion without s/sx of aspiration.   Written swallowing guidelines left at the bedside and reviewed with the pt and RN.. Pt and or the pt's caregiver will need further teaching.   BRENNA Quesada aware of the strategies and rationale for the EC7 solid /thin liquid oral diet recommendations when oral diet permitted by GI.

## 2024-04-16 NOTE — CARE PLAN
Problem: Pain  Goal: My pain/discomfort is manageable  Outcome: Progressing     Problem: Safety  Goal: Patient will be injury free during hospitalization  Outcome: Progressing  Goal: I will remain free of falls  Outcome: Progressing     Problem: Daily Care  Goal: Daily care needs are met  Outcome: Progressing     Problem: Psychosocial Needs  Goal: Demonstrates ability to cope with hospitalization/illness  Outcome: Progressing  Goal: Collaborate with me, my family, and caregiver to identify my specific goals  Outcome: Progressing     Problem: Discharge Barriers  Goal: My discharge needs are met  Outcome: Progressing     Problem: ACS/CP/NSTEMI/STEMI  Goal: Chest pain managed (free from pain or at acceptable level)  Outcome: Progressing  Goal: Lab values return to normal range  Outcome: Progressing  Goal: Promote self management  Outcome: Progressing  Goal: Serial ECG will return to baseline  Outcome: Progressing  Goal: Verbalize understanding of procedures/devices  Outcome: Progressing  Goal: Wean vasopressors/achieve hemodynamic stability  Outcome: Progressing     Problem: Arrythmia/Dysrhythmia  Goal: Lab values return to normal range  Outcome: Progressing  Goal: No evidence of post procedure complications  Outcome: Progressing  Goal: Promote self management  Outcome: Progressing  Goal: Serial ECG will return to baseline  Outcome: Progressing  Goal: Verbalize understanding of procedures/devices  Outcome: Progressing  Goal: Vital signs return to baseline  Outcome: Progressing  Goal: Care and maintenance of device (specify)  Outcome: Progressing     Problem: Cardiac catheterization  Goal: Free from dysrhythmias  Outcome: Progressing  Goal: Free from pain  Outcome: Progressing  Goal: No evidence of post procedure complications  Outcome: Progressing  Goal: Promote self management  Outcome: Progressing  Goal: Verbalize understanding of procedure  Outcome: Progressing  Goal: Care and maintenance of device  (specify)  Outcome: Progressing     Problem: Hypertensive Emergency/Crisis  Goal: Blood pressure gradually reduced to goal range  Outcome: Progressing  Goal: Free from signs of organ damage  Outcome: Progressing  Goal: Lab values return to normal range  Outcome: Progressing  Goal: Promote self management  Outcome: Progressing     Problem: Safety - Medical Restraint  Goal: Remains free of injury from restraints (Restraint for Interference with Medical Device)  Outcome: Progressing  Goal: Free from restraint(s) (Restraint for Interference with Medical Device)  Outcome: Progressing     Problem: Pain - Adult  Goal: Verbalizes/displays adequate comfort level or baseline comfort level  Outcome: Progressing     Problem: Safety - Adult  Goal: Free from fall injury  Outcome: Progressing     Problem: Discharge Planning  Goal: Discharge to home or other facility with appropriate resources  Outcome: Progressing     Problem: Chronic Conditions and Co-morbidities  Goal: Patient's chronic conditions and co-morbidity symptoms are monitored and maintained or improved  Outcome: Progressing     Problem: Skin  Goal: Decreased wound size/increased tissue granulation at next dressing change  Outcome: Progressing  Goal: Participates in plan/prevention/treatment measures  Outcome: Progressing  Goal: Prevent/manage excess moisture  Outcome: Progressing  Goal: Prevent/minimize sheer/friction injuries  Outcome: Progressing  Goal: Promote/optimize nutrition  Outcome: Progressing  Goal: Promote skin healing  Outcome: Progressing     Problem: Fall/Injury  Goal: Not fall by end of shift  Outcome: Progressing  Goal: Be free from injury by end of the shift  Outcome: Progressing  Goal: Verbalize understanding of personal risk factors for fall in the hospital  Outcome: Progressing  Goal: Verbalize understanding of risk factor reduction measures to prevent injury from fall in the home  Outcome: Progressing  Goal: Use assistive devices by end of the  shift  Outcome: Progressing  Goal: Pace activities to prevent fatigue by end of the shift  Outcome: Progressing     Problem: Pain  Goal: Takes deep breaths with improved pain control throughout the shift  Outcome: Progressing  Goal: Turns in bed with improved pain control throughout the shift  Outcome: Progressing  Goal: Walks with improved pain control throughout the shift  Outcome: Progressing  Goal: Performs ADL's with improved pain control throughout shift  Outcome: Progressing  Goal: Participates in PT with improved pain control throughout the shift  Outcome: Progressing  Goal: Free from opioid side effects throughout the shift  Outcome: Progressing  Goal: Free from acute confusion related to pain meds throughout the shift  Outcome: Progressing

## 2024-04-16 NOTE — PROGRESS NOTES
"Hospital Medicine Progress Note    Subjective:  Trey Borjas is a 64 y.o. male, who is hospital day 4.     Patient was extubated yesterday. Denies any new sympts.     Objective:    /76   Pulse 82   Temp 36.1 °C (97 °F) (Temporal)   Resp 14   Ht 1.829 m (6' 0.01\")   Wt 98.6 kg (217 lb 6 oz)   SpO2 92%   BMI 29.47 kg/m²     Physical Exam:  GENERAL: A&Ox3, no distress, cooperative  HEENT: NC/AT, EOMI, clear sclera, MMM  NECK: Supple  CARDIOVASCULAR: RRR, no murmurs. S1/S2  RESPIRATORY: CTAB, no RRW or crackles. No distress  ABDOMEN: Soft, ND, non-tender. No rebound or guarding. BS+  EXTREMITIES: No peripheral edema  NEUROLOGICAL: No focal deficits  SKIN: Warm and dry, no rashes  PSYCH: appropriate mood and affect    I personally reviewed all imaging, labs and notes/ documentation.     Assessment & Plan:    S/p Cardiac arrest (pVtach/ torsades)  PAF/ a flutter  Acute SDH  Acute metabolic encephalopathy, possible hepatic/ wernicke encephalopathy  Acute respiratory failure 2/2 cardiac arrest, improved  Acute gastritis   Hepatic cirrhosis  Transaminitis    Alcohol use disorder  T2DM with peripheral neuropathy   AV block s/p pacemaker  HTN, HLD    Management per ICU team, extubated yesterday, now on 4L NC, CAP antibiotics stopped by ICU  Cards following, resuming BB, pt currently maintaining SR, may be a candidate for LAAO/Watchman device  CTH with small R posterior SDH with mild mass effect, stable on repeat imaging, neurosurg following recommend platelet goal >75, INR <1.6, keppra for seizure prophylaxis  GI following, EGD yesterday with gastritis, no plans for colonoscopy at this time, IV PPI, off octreotide gtt, lactulose, monitor hgb, s/p 1 unit pRBCs on 4/15, s/p 1 unit platelets on 4/14  Thiamine, folate  PT/OT, likely SNF on dc, SLP following  Continue flomax    DVT Prophylaxis: SCDs only  Code Status: Full Code   Disposition: Cardiac ICU      Martina Becker, DO  Internal Medicine/ Hospitalist   "

## 2024-04-16 NOTE — PROGRESS NOTES
Cardiology Progress    Impression:  Impression:  ETOH intoxication  NSVT in the setting of hypokalemia.  No recurrence.  P. Afib/flutter. CHADVASC 2.  Now sinus rhythm.  History of GI bleeding.  Anemia.  AV block  s/p PPM  HTN  DLP  Neuropathy  Plan:  Resume beta-blockers.  Currently V paced  Since patient maintaining sinus rhythm no immediate indication to start anticoagulation.  However he does have paroxysmal A-fib. If patient shows compliance with medical therapy and follow-up, and avoid alcohol consumption, may be candidate for LAAO/Watchman device.    No events.  No complaints.  Family at bedside.  Currently V paced (sinus rhythm)  Meds:  Scheduled medications  [Held by provider] allopurinol, 300 mg, oral, Daily  folic acid, 1 mg, oral, Daily  lactulose, 20 g, oral, TID  levETIRAcetam, 500 mg, intravenous, q12h  magnesium sulfate, 2 g, intravenous, Once  metoprolol tartrate, 25 mg, oral, BID  multivitamin with minerals, 1 tablet, oral, Daily  oxygen, , inhalation, Continuous - Inhalation  potassium chloride, 20 mEq, oral, Once  tamsulosin, 0.4 mg, oral, Daily      Continuous medications  dexmedeTOMIDine, 0.1-1.5 mcg/kg/hr, Last Rate: Stopped (04/14/24 2000)  dextrose 5 % and lactated Ringer's, 125 mL/hr, Last Rate: 125 mL/hr (04/16/24 0949)      PRN medications  PRN medications: acetaminophen    Physical exam:  Vitals:    04/16/24 1000   BP: 127/83   Pulse: 104   Resp: 19   Temp:    SpO2: (!) 89%   Awake alert oriented no acute distress regular rhythm and rate normal S1-S2 good bilateral air entry clear to auscultation abdomen soft distended bowel sounds positive peripheral pulses positive no lower extremity edema.  EKG:  V paced  Echo:  No results found for this or any previous visit.   Labs:  Lab Results   Component Value Date    WBC 7.9 04/16/2024    HGB 8.9 (L) 04/16/2024    HCT 26.9 (L) 04/16/2024    PLT 91 (L) 04/16/2024    CHOL 179 09/24/2021    TRIG 138 09/24/2021    HDL 47.9 09/24/2021    ALT 37  04/16/2024    AST 76 (H) 04/16/2024     04/16/2024    K 3.8 04/16/2024     04/16/2024    CREATININE 0.80 04/16/2024    BUN 6 04/16/2024    CO2 26 04/16/2024    TSH 2.91 09/24/2021    INR 1.4 (H) 04/15/2024     par

## 2024-04-17 DIAGNOSIS — S06.5XAA SDH (SUBDURAL HEMATOMA) (MULTI): Primary | ICD-10-CM

## 2024-04-17 LAB
ALBUMIN SERPL BCP-MCNC: 2.6 G/DL (ref 3.4–5)
ALP SERPL-CCNC: 133 U/L (ref 33–136)
ALT SERPL W P-5'-P-CCNC: 31 U/L (ref 10–52)
ANION GAP SERPL CALC-SCNC: 12 MMOL/L (ref 10–20)
AST SERPL W P-5'-P-CCNC: 62 U/L (ref 9–39)
BACTERIA BLD CULT: NORMAL
BACTERIA BLD CULT: NORMAL
BILIRUB SERPL-MCNC: 4.9 MG/DL (ref 0–1.2)
BUN SERPL-MCNC: 6 MG/DL (ref 6–23)
CALCIUM SERPL-MCNC: 8.5 MG/DL (ref 8.6–10.3)
CHLORIDE SERPL-SCNC: 105 MMOL/L (ref 98–107)
CO2 SERPL-SCNC: 24 MMOL/L (ref 21–32)
CREAT SERPL-MCNC: 0.8 MG/DL (ref 0.5–1.3)
EGFRCR SERPLBLD CKD-EPI 2021: >90 ML/MIN/1.73M*2
ERYTHROCYTE [DISTWIDTH] IN BLOOD BY AUTOMATED COUNT: 18.5 % (ref 11.5–14.5)
GLUCOSE BLD MANUAL STRIP-MCNC: 108 MG/DL (ref 74–99)
GLUCOSE BLD MANUAL STRIP-MCNC: 112 MG/DL (ref 74–99)
GLUCOSE BLD MANUAL STRIP-MCNC: 121 MG/DL (ref 74–99)
GLUCOSE BLD MANUAL STRIP-MCNC: 125 MG/DL (ref 74–99)
GLUCOSE BLD MANUAL STRIP-MCNC: 94 MG/DL (ref 74–99)
GLUCOSE SERPL-MCNC: 109 MG/DL (ref 74–99)
HCT VFR BLD AUTO: 27 % (ref 41–52)
HGB BLD-MCNC: 9 G/DL (ref 13.5–17.5)
INR PPP: 1.6 (ref 0.9–1.1)
MAGNESIUM SERPL-MCNC: 1.83 MG/DL (ref 1.6–2.4)
MCH RBC QN AUTO: 33.3 PG (ref 26–34)
MCHC RBC AUTO-ENTMCNC: 33.3 G/DL (ref 32–36)
MCV RBC AUTO: 100 FL (ref 80–100)
NRBC BLD-RTO: 0.4 /100 WBCS (ref 0–0)
PLATELET # BLD AUTO: 92 X10*3/UL (ref 150–450)
POTASSIUM SERPL-SCNC: 4 MMOL/L (ref 3.5–5.3)
PROT SERPL-MCNC: 5.5 G/DL (ref 6.4–8.2)
PROTHROMBIN TIME: 18 SECONDS (ref 9.8–12.8)
RBC # BLD AUTO: 2.7 X10*6/UL (ref 4.5–5.9)
SODIUM SERPL-SCNC: 137 MMOL/L (ref 136–145)
WBC # BLD AUTO: 7.7 X10*3/UL (ref 4.4–11.3)

## 2024-04-17 PROCEDURE — 2060000001 HC INTERMEDIATE ICU ROOM DAILY

## 2024-04-17 PROCEDURE — 99233 SBSQ HOSP IP/OBS HIGH 50: CPT | Performed by: INTERNAL MEDICINE

## 2024-04-17 PROCEDURE — 2500000001 HC RX 250 WO HCPCS SELF ADMINISTERED DRUGS (ALT 637 FOR MEDICARE OP): Performed by: INTERNAL MEDICINE

## 2024-04-17 PROCEDURE — C9113 INJ PANTOPRAZOLE SODIUM, VIA: HCPCS

## 2024-04-17 PROCEDURE — 92526 ORAL FUNCTION THERAPY: CPT | Mod: GN

## 2024-04-17 PROCEDURE — 36415 COLL VENOUS BLD VENIPUNCTURE: CPT

## 2024-04-17 PROCEDURE — 2500000001 HC RX 250 WO HCPCS SELF ADMINISTERED DRUGS (ALT 637 FOR MEDICARE OP)

## 2024-04-17 PROCEDURE — 82947 ASSAY GLUCOSE BLOOD QUANT: CPT

## 2024-04-17 PROCEDURE — 83735 ASSAY OF MAGNESIUM: CPT | Performed by: INTERNAL MEDICINE

## 2024-04-17 PROCEDURE — 2500000004 HC RX 250 GENERAL PHARMACY W/ HCPCS (ALT 636 FOR OP/ED): Performed by: INTERNAL MEDICINE

## 2024-04-17 PROCEDURE — 99231 SBSQ HOSP IP/OBS SF/LOW 25: CPT | Performed by: NURSE PRACTITIONER

## 2024-04-17 PROCEDURE — 2500000006 HC RX 250 W HCPCS SELF ADMINISTERED DRUGS (ALT 637 FOR ALL PAYERS)

## 2024-04-17 PROCEDURE — 2500000005 HC RX 250 GENERAL PHARMACY W/O HCPCS: Performed by: INTERNAL MEDICINE

## 2024-04-17 PROCEDURE — 2500000006 HC RX 250 W HCPCS SELF ADMINISTERED DRUGS (ALT 637 FOR ALL PAYERS): Performed by: INTERNAL MEDICINE

## 2024-04-17 PROCEDURE — 80053 COMPREHEN METABOLIC PANEL: CPT | Performed by: INTERNAL MEDICINE

## 2024-04-17 PROCEDURE — 85610 PROTHROMBIN TIME: CPT

## 2024-04-17 PROCEDURE — 2500000004 HC RX 250 GENERAL PHARMACY W/ HCPCS (ALT 636 FOR OP/ED)

## 2024-04-17 PROCEDURE — 85027 COMPLETE CBC AUTOMATED: CPT | Performed by: INTERNAL MEDICINE

## 2024-04-17 PROCEDURE — 36415 COLL VENOUS BLD VENIPUNCTURE: CPT | Performed by: INTERNAL MEDICINE

## 2024-04-17 RX ORDER — PHYTONADIONE 5 MG/1
2.5 TABLET ORAL ONCE
Status: DISCONTINUED | OUTPATIENT
Start: 2024-04-17 | End: 2024-04-17

## 2024-04-17 RX ORDER — PHYTONADIONE 5 MG/1
5 TABLET ORAL ONCE
Status: DISCONTINUED | OUTPATIENT
Start: 2024-04-17 | End: 2024-04-17

## 2024-04-17 RX ORDER — PHYTONADIONE 5 MG/1
5 TABLET ORAL ONCE
Status: COMPLETED | OUTPATIENT
Start: 2024-04-17 | End: 2024-04-17

## 2024-04-17 RX ORDER — MAGNESIUM SULFATE HEPTAHYDRATE 40 MG/ML
2 INJECTION, SOLUTION INTRAVENOUS ONCE
Status: COMPLETED | OUTPATIENT
Start: 2024-04-17 | End: 2024-04-17

## 2024-04-17 RX ADMIN — MAGNESIUM SULFATE HEPTAHYDRATE 2 G: 40 INJECTION, SOLUTION INTRAVENOUS at 08:54

## 2024-04-17 RX ADMIN — LACTULOSE 20 G: 20 SOLUTION ORAL at 08:55

## 2024-04-17 RX ADMIN — PANTOPRAZOLE SODIUM 40 MG: 40 INJECTION, POWDER, FOR SOLUTION INTRAVENOUS at 08:55

## 2024-04-17 RX ADMIN — FOLIC ACID 1 MG: 1 TABLET ORAL at 08:55

## 2024-04-17 RX ADMIN — METOPROLOL TARTRATE 25 MG: 25 TABLET, FILM COATED ORAL at 08:55

## 2024-04-17 RX ADMIN — Medication 3 L/MIN: at 09:00

## 2024-04-17 RX ADMIN — LACTULOSE 20 G: 20 SOLUTION ORAL at 15:42

## 2024-04-17 RX ADMIN — LEVETIRACETAM 500 MG: 5 INJECTION INTRAVENOUS at 00:58

## 2024-04-17 RX ADMIN — TAMSULOSIN HYDROCHLORIDE 0.4 MG: 0.4 CAPSULE ORAL at 08:55

## 2024-04-17 RX ADMIN — Medication 1 TABLET: at 08:55

## 2024-04-17 RX ADMIN — LEVETIRACETAM 500 MG: 5 INJECTION INTRAVENOUS at 13:21

## 2024-04-17 RX ADMIN — LACTULOSE 20 G: 20 SOLUTION ORAL at 20:04

## 2024-04-17 RX ADMIN — METOPROLOL TARTRATE 25 MG: 25 TABLET, FILM COATED ORAL at 20:04

## 2024-04-17 RX ADMIN — Medication: at 20:00

## 2024-04-17 RX ADMIN — PHYTONADIONE 5 MG: 5 TABLET ORAL at 17:22

## 2024-04-17 ASSESSMENT — PAIN - FUNCTIONAL ASSESSMENT
PAIN_FUNCTIONAL_ASSESSMENT: 0-10

## 2024-04-17 ASSESSMENT — PAIN SCALES - GENERAL
PAINLEVEL_OUTOF10: 0 - NO PAIN

## 2024-04-17 NOTE — PROGRESS NOTES
Dispo planning for SNF. SNF list provided per previous TCC. Spoke to patient at bedside. Patient has not reviewed list yet. States will review and make choices, requesting TCC follow up tomorrow for choices. TCC to continue to follow for discharge planning.     ANAHY DUEÑAS, RN TCC

## 2024-04-17 NOTE — PROGRESS NOTES
"Trey Borjas is a 64 y.o. male on day 5 of admission presenting with Atrial flutter, unspecified type (Multi).    Subjective   Feels better overall       Objective   No adverse events overnight  Physical Exam  Well nourished, well developed male, resting in bed  A&O x 3, speech clear / fluent, no facial droop  Tongue is midline, palate symmetric  No pronator drift. Left finger-nose mildly dysmetric  Skin is warm / dry, no obvious lesions on exposed skin   Thorax is midline; chest expansion is symmetric  Abdomen is not distended  Urine - urinary diversion in place    Last Recorded Vitals  Blood pressure 125/70, pulse 79, temperature 36.9 °C (98.4 °F), temperature source Temporal, resp. rate 14, height 1.829 m (6' 0.01\"), weight 98.6 kg (217 lb 6 oz), SpO2 96%.  Intake/Output last 3 Shifts:  I/O last 3 completed shifts:  In: 1882.2 (19.1 mL/kg) [I.V.:1882.2 (19.1 mL/kg)]  Out: 3558 (36.1 mL/kg) [Urine:3558 (1 mL/kg/hr)]  Weight: 98.6 kg     Relevant Results         Assessment/Plan   Active Problems:    Atrial flutter, unspecified type (Multi)  Right SDH - mild left dysmetria on exam  - Platelets remain > 75  - On Keppra   - Continue medical management, HOB>30 degrees, SBP <160.  - Can follow up with me as outpatient with repeat CT Head in 2 weeks        I spent >25 minutes in the professional and overall care of this patient.      Tresa Gagnon, APRN-CNP      "

## 2024-04-17 NOTE — PROGRESS NOTES
Cardiology Progress    Impression:  ETOH intoxication  NSVT in the setting of hypokalemia.  No recurrence.  P. Afib/flutter. CHADVASC 2.  Now sinus rhythm.  History of GI bleeding.  Anemia.  AV block  s/p PPM  HTN  DLP  Plan:  Ambulate  Continue Lopressor  Since patient maintaining sinus rhythm no immediate indication to start anticoagulation.  However he does have paroxysmal A-fib. If patient shows compliance with medical therapy and follow-up, and avoid alcohol consumption, may be candidate for LAAO/Watchman device   HPI:  Awake and alert.  No cardiac symptoms.  Remains sinus rhythm.  Meds:  Scheduled medications  [Held by provider] allopurinol, 300 mg, oral, Daily  folic acid, 1 mg, oral, Daily  lactulose, 20 g, oral, TID  levETIRAcetam, 500 mg, intravenous, q12h  metoprolol tartrate, 25 mg, oral, BID  multivitamin with minerals, 1 tablet, oral, Daily  oxygen, , inhalation, Continuous - Inhalation  pantoprazole, 40 mg, intravenous, Daily  tamsulosin, 0.4 mg, oral, Daily      Continuous medications  dexmedeTOMIDine, 0.1-1.5 mcg/kg/hr, Last Rate: Stopped (04/14/24 2000)      PRN medications  PRN medications: acetaminophen    Physical exam:  Vitals:    04/17/24 1200   BP: 131/68   Pulse: 65   Resp: 17   Temp:    SpO2: 95%      JVD.  Chest clear.  No edema.  EKG:  Telemetry shows sinus rhythm with intermittent ventricular pacing.  Echo:  Normal EF  Labs:  Lab Results   Component Value Date    WBC 7.7 04/17/2024    HGB 9.0 (L) 04/17/2024    HCT 27.0 (L) 04/17/2024    PLT 92 (L) 04/17/2024    CHOL 179 09/24/2021    TRIG 138 09/24/2021    HDL 47.9 09/24/2021    ALT 31 04/17/2024    AST 62 (H) 04/17/2024     04/17/2024    K 4.0 04/17/2024     04/17/2024    CREATININE 0.80 04/17/2024    BUN 6 04/17/2024    CO2 24 04/17/2024    TSH 2.91 09/24/2021    INR 1.4 (H) 04/15/2024     par

## 2024-04-17 NOTE — PROGRESS NOTES
"Hospital Medicine Progress Note    Subjective:  Trey Borjas is a 64 y.o. male, who is hospital day 5.     Patient is feeling better, denies any acute complaints.     Objective:    /56   Pulse 78   Temp 37 °C (98.6 °F) (Temporal)   Resp 20   Ht 1.829 m (6' 0.01\")   Wt 98.4 kg (216 lb 14.9 oz)   SpO2 93%   BMI 29.41 kg/m²     Physical Exam:  GENERAL: A&Ox3, no distress, cooperative  HEENT: NC/AT, EOMI, clear sclera, MMM  NECK: Supple  CARDIOVASCULAR: RRR, no murmurs. S1/S2  RESPIRATORY: CTAB, no RRW or crackles. No distress  ABDOMEN: Soft, ND, non-tender. No rebound or guarding. BS+  EXTREMITIES: No peripheral edema  NEUROLOGICAL: No focal deficits  SKIN: Warm and dry, no rashes  PSYCH: appropriate mood and affect    I personally reviewed all imaging, labs and notes/ documentation.     Assessment & Plan:    S/p Cardiac arrest (pVtach/ torsades)  PAF/ a flutter  Acute SDH  Acute metabolic encephalopathy, possible hepatic/ wernicke encephalopathy  Acute respiratory failure 2/2 cardiac arrest, improved  Acute gastritis   Hepatic cirrhosis  Transaminitis    Alcohol use disorder  T2DM with peripheral neuropathy   AV block s/p pacemaker  HTN, HLD    ICU signed off, extubated on 4/15, now on 3L NC  Cards following, resuming BB, pt currently maintaining SR, may be a candidate for LAAO/Watchman device  CTH with small R posterior SDH with mild mass effect, stable on repeat imaging, neurosurg following recommend platelet goal >75, INR <1.6, keppra for seizure prophylaxis  GI signed off, EGD 4/15 with gastritis, no plans for colonoscopy at this time, IV PPI, lactulose, monitor hgb, s/p 1 unit pRBCs on 4/15, s/p 1 unit platelets on 4/14. Diet advanced per SLP recs given no further GI intervention  Thiamine, folate  PT/OT, likely SNF on dc, SLP following  Continue flomax    DVT Prophylaxis: SCDs only  Code Status: Full Code   Disposition: Cardiac ICU      Martina Becker, DO  Internal Medicine/ Hospitalist   "

## 2024-04-17 NOTE — PROGRESS NOTES
Speech-Language Pathology    SLP Adult Inpatient  Speech-Language Pathology Treatment     Patient Name: Trey Borjas  MRN: [id number]  Today's Date: 4/17/2024  Time Calculation  Start Time: 1055  Stop Time: 1115  Time Calculation (min): 20 min         Current Problem:   1. Atrial flutter, unspecified type (Multi)  Transthoracic Echo (TTE) Complete    Transthoracic Echo (TTE) Complete      2. Alcohol abuse        3. Hypokalemia        4. High anion gap metabolic acidosis        5. Elevated liver enzymes        6. Other polyneuropathy  gabapentin (Neurontin) 100 mg capsule      7. Hematemesis, unspecified whether nausea present  EGD    EGD    EGD      8. Esophagitis  EGD            SLP Assessment:  Prognosis: Good  Treatment Tolerance: Patient tolerated treatment well  Strengths:  (no h/o dysphagia)  Barriers: Other (groggy -slow oral prep skills)    Min Oral Dysphagia ongoing as e/b slow mastication and oral bolus formation-manipulation skills of ice chips given x10.   Pt on Clear liquids at this time awaiting GI clearance for solids d/t bloody stools earlier this week. A&O x 3, speech clear / fluent, no facial droop.  Tongue is midline, palate symmetric   Pt consumes thins via the 3 ounce water exam x2 via straw sips without breaking or clinical s/sx of aspiration.   Pt remains mildly groggy however Ox3 and would benefit from  an Easy to Chew Solid oral diet at this time for ease of mastication skills when permitted by GI.     OME WFL but mild slow oral prep skills ongoing.   BRENNA Quesada states pt tolerating the thins without difficulty.  Pt remains on 3 liters of O2 via NC with 02 saturation 94 % t/o the exam.    Plan:  Inpatient/Swing Bed or Outpatient: Inpatient  Treatment/Interventions: Other (Comment) (Dysphagia follow up)  SLP TX Plan: Continue Plan of Care, (extend x 1 more week. 0/2 x)  SLP Frequency: 2x per week  Duration: 1 week  Discussed POC: Nursing, Other (Comment) (Sangita)  Discussed Risks/Benefits:  Nursing      Dysphagia Goals ( established 4/16/ 24)   Pt will tolerate the LRD with adequate oral phase and no s/s of aspiration across PO.  Good tolerance of thins demo'd this date 4/17/24   Pt/caregiver /family will adhere to safe feeding/swallowing strategies during PO intake with > 95 % accuracy given min cues.  Ongoing   Tolerate advancing PO regular solid trials as grogginess improves.  Goal not targeted this date 4/17/24 . Pt on Clears only.     Therapeutic Swallow Intervention : PO Trials  Solid Diet Recommendations:  Easy to Chew  solids when cleared by GI.  Liquid Diet Recommendations: Thin (IDDSI Level 0)    Subjective   Current Problem:  Min Oral Dysphagia ongoing     Most Recent Visit:  SLP Received On: 04/17/24    General Visit Information:    Groggy, speech clear with vocal quality WFL . Interactive when asked questions but slow to respond.       Pain Assessment:   Pain Assessment: 0-10  Pain Score: 0 - No pain      Objective       Therapeutic Swallow:  Therapeutic Swallow Intervention : PO Trials  Solid Diet Recommendations: Other (Comment) (EC7 solids when cleared by GI)  Liquid Diet Recommendations: Thin (IDDSI Level 0)     Safe functional swallow demonstrated across ice chips and thins in volume consumed amounts via the 3 ounce.     Inpatient:  Education Documentation  SLP has provided pt and BRENNA Sanders with the results and recommendations of this session.        Consultations/Referrals/Coordination of Services: GI following with a possible colonoscopy plan while in house . Discharge plan SNF.

## 2024-04-17 NOTE — CARE PLAN
Problem: Skin  Goal: Decreased wound size/increased tissue granulation at next dressing change  4/17/2024 0756 by Sangita Desouza RN  Flowsheets (Taken 4/15/2024 0847)  Decreased wound size/increased tissue granulation at next dressing change: Protective dressings over bony prominences  4/17/2024 0756 by Sangita Desouza RN  Outcome: Progressing     Problem: Skin  Goal: Participates in plan/prevention/treatment measures  4/17/2024 0756 by Sangita Desouza RN  Flowsheets (Taken 4/15/2024 0847)  Participates in plan/prevention/treatment measures: Discuss with provider PT/OT consult  4/17/2024 0756 by Sangita Desouza RN  Outcome: Progressing     Problem: Skin  Goal: Prevent/manage excess moisture  4/17/2024 0756 by Sangita Desouza RN  Flowsheets (Taken 4/15/2024 0847)  Prevent/manage excess moisture: Cleanse incontinence/protect with barrier cream  4/17/2024 0756 by Sangita Desouza RN  Outcome: Progressing     Problem: Skin  Goal: Prevent/minimize sheer/friction injuries  4/17/2024 0756 by Sangita Desouza RN  Flowsheets (Taken 4/15/2024 0847)  Prevent/minimize sheer/friction injuries: Turn/reposition every 2 hours/use positioning/transfer devices  4/17/2024 0756 by Sangita Desouza RN  Outcome: Progressing     Problem: Skin  Goal: Promote/optimize nutrition  4/17/2024 0756 by Sangita Desouza RN  Flowsheets (Taken 4/15/2024 0847)  Promote/optimize nutrition: Monitor/record intake including meals  4/17/2024 0756 by Sangita Desouza RN  Outcome: Progressing     Problem: Skin  Goal: Promote skin healing  4/17/2024 0756 by Sangita Desouza RN  Flowsheets (Taken 4/15/2024 0847)  Promote skin healing: Turn/reposition every 2 hours/use positioning/transfer devices  4/17/2024 0756 by Sangita Desouza RN  Outcome: Progressing   The patient's goals for the shift include      The clinical goals for the shift include pt will remain hemodynamically stable

## 2024-04-17 NOTE — CARE PLAN
Problem: Pain  Goal: My pain/discomfort is manageable  Outcome: Progressing     Problem: Safety  Goal: Patient will be injury free during hospitalization  Outcome: Progressing  Goal: I will remain free of falls  Outcome: Progressing     Problem: Daily Care  Goal: Daily care needs are met  Outcome: Progressing     Problem: Psychosocial Needs  Goal: Demonstrates ability to cope with hospitalization/illness  Outcome: Progressing  Goal: Collaborate with me, my family, and caregiver to identify my specific goals  Outcome: Progressing     Problem: Discharge Barriers  Goal: My discharge needs are met  Outcome: Progressing     Problem: Arrythmia/Dysrhythmia  Goal: Lab values return to normal range  Outcome: Progressing  Goal: No evidence of post procedure complications  Outcome: Progressing  Goal: Promote self management  Outcome: Progressing  Goal: Serial ECG will return to baseline  Outcome: Progressing  Goal: Verbalize understanding of procedures/devices  Outcome: Progressing  Goal: Vital signs return to baseline  Outcome: Progressing  Goal: Care and maintenance of device (specify)  Outcome: Progressing     Problem: Respiratory  Goal: Clear secretions with interventions this shift  Outcome: Progressing  Goal: Minimize anxiety/maximize coping throughout shift  Outcome: Progressing  Goal: Minimal/no exertional discomfort or dyspnea this shift  Outcome: Progressing  Goal: No signs of respiratory distress (eg. Use of accessory muscles. Peds grunting)  Outcome: Progressing  Goal: Patent airway maintained this shift  Outcome: Progressing  Goal: Tolerate mechanical ventilation evidenced by VS/agitation level this shift  Outcome: Progressing  Goal: Tolerate pulmonary toileting this shift  Outcome: Progressing  Goal: Verbalize decreased shortness of breath this shift  Outcome: Progressing  Goal: Wean oxygen to maintain O2 saturation per order/standard this shift  Outcome: Progressing  Goal: Increase self care and/or family  involvement in next 24 hours  Outcome: Progressing     Problem: Pain - Adult  Goal: Verbalizes/displays adequate comfort level or baseline comfort level  Outcome: Progressing     Problem: Safety - Adult  Goal: Free from fall injury  Outcome: Progressing     Problem: Discharge Planning  Goal: Discharge to home or other facility with appropriate resources  Outcome: Progressing     Problem: Chronic Conditions and Co-morbidities  Goal: Patient's chronic conditions and co-morbidity symptoms are monitored and maintained or improved  Outcome: Progressing     Problem: Skin  Goal: Decreased wound size/increased tissue granulation at next dressing change  Outcome: Progressing  Goal: Participates in plan/prevention/treatment measures  Outcome: Progressing  Goal: Prevent/manage excess moisture  Outcome: Progressing  Goal: Prevent/minimize sheer/friction injuries  Outcome: Progressing  Goal: Promote/optimize nutrition  Outcome: Progressing  Goal: Promote skin healing  Outcome: Progressing     Problem: Fall/Injury  Goal: Not fall by end of shift  Outcome: Progressing  Goal: Be free from injury by end of the shift  Outcome: Progressing  Goal: Verbalize understanding of personal risk factors for fall in the hospital  Outcome: Progressing  Goal: Verbalize understanding of risk factor reduction measures to prevent injury from fall in the home  Outcome: Progressing  Goal: Use assistive devices by end of the shift  Outcome: Progressing  Goal: Pace activities to prevent fatigue by end of the shift  Outcome: Progressing     Problem: Pain  Goal: Takes deep breaths with improved pain control throughout the shift  Outcome: Progressing  Goal: Turns in bed with improved pain control throughout the shift  Outcome: Progressing  Goal: Walks with improved pain control throughout the shift  Outcome: Progressing  Goal: Performs ADL's with improved pain control throughout shift  Outcome: Progressing  Goal: Participates in PT with improved pain  control throughout the shift  Outcome: Progressing  Goal: Free from opioid side effects throughout the shift  Outcome: Progressing  Goal: Free from acute confusion related to pain meds throughout the shift  Outcome: Progressing

## 2024-04-17 NOTE — CARE PLAN
Problem: Pain  Goal: My pain/discomfort is manageable  Outcome: Progressing     Problem: Safety  Goal: Patient will be injury free during hospitalization  Outcome: Progressing  Goal: I will remain free of falls  Outcome: Progressing     Problem: Daily Care  Goal: Daily care needs are met  Outcome: Progressing     Problem: Psychosocial Needs  Goal: Demonstrates ability to cope with hospitalization/illness  Outcome: Progressing  Goal: Collaborate with me, my family, and caregiver to identify my specific goals  Outcome: Progressing     Problem: Discharge Barriers  Goal: My discharge needs are met  Outcome: Progressing     Problem: Arrythmia/Dysrhythmia  Goal: Lab values return to normal range  Outcome: Progressing  Goal: No evidence of post procedure complications  Outcome: Progressing  Goal: Promote self management  Outcome: Progressing  Goal: Serial ECG will return to baseline  Outcome: Progressing  Goal: Verbalize understanding of procedures/devices  Outcome: Progressing  Goal: Vital signs return to baseline  Outcome: Progressing  Goal: Care and maintenance of device (specify)  Outcome: Progressing     Problem: Respiratory  Goal: Clear secretions with interventions this shift  Outcome: Progressing  Goal: Minimize anxiety/maximize coping throughout shift  Outcome: Progressing  Goal: Minimal/no exertional discomfort or dyspnea this shift  Outcome: Progressing  Goal: No signs of respiratory distress (eg. Use of accessory muscles. Peds grunting)  Outcome: Progressing  Goal: Patent airway maintained this shift  Outcome: Progressing  Goal: Tolerate mechanical ventilation evidenced by VS/agitation level this shift  Outcome: Progressing  Goal: Tolerate pulmonary toileting this shift  Outcome: Progressing  Goal: Verbalize decreased shortness of breath this shift  Outcome: Progressing  Goal: Wean oxygen to maintain O2 saturation per order/standard this shift  Outcome: Progressing  Goal: Increase self care and/or family  involvement in next 24 hours  Outcome: Progressing     Problem: Pain - Adult  Goal: Verbalizes/displays adequate comfort level or baseline comfort level  Outcome: Progressing     Problem: Safety - Adult  Goal: Free from fall injury  Outcome: Progressing     Problem: Discharge Planning  Goal: Discharge to home or other facility with appropriate resources  Outcome: Progressing     Problem: Chronic Conditions and Co-morbidities  Goal: Patient's chronic conditions and co-morbidity symptoms are monitored and maintained or improved  Outcome: Progressing     Problem: Skin  Goal: Decreased wound size/increased tissue granulation at next dressing change  Outcome: Progressing  Goal: Participates in plan/prevention/treatment measures  Outcome: Progressing  Goal: Prevent/manage excess moisture  Outcome: Progressing  Goal: Prevent/minimize sheer/friction injuries  Outcome: Progressing  Goal: Promote/optimize nutrition  Outcome: Progressing  Goal: Promote skin healing  Outcome: Progressing     Problem: Fall/Injury  Goal: Not fall by end of shift  Outcome: Progressing  Goal: Be free from injury by end of the shift  Outcome: Progressing  Goal: Verbalize understanding of personal risk factors for fall in the hospital  Outcome: Progressing  Goal: Verbalize understanding of risk factor reduction measures to prevent injury from fall in the home  Outcome: Progressing  Goal: Use assistive devices by end of the shift  Outcome: Progressing  Goal: Pace activities to prevent fatigue by end of the shift  Outcome: Progressing     Problem: Pain  Goal: Takes deep breaths with improved pain control throughout the shift  Outcome: Progressing  Goal: Turns in bed with improved pain control throughout the shift  Outcome: Progressing  Goal: Walks with improved pain control throughout the shift  Outcome: Progressing  Goal: Performs ADL's with improved pain control throughout shift  Outcome: Progressing  Goal: Participates in PT with improved pain  control throughout the shift  Outcome: Progressing  Goal: Free from opioid side effects throughout the shift  Outcome: Progressing  Goal: Free from acute confusion related to pain meds throughout the shift  Outcome: Progressing   The patient's goals for the shift include      The clinical goals for the shift include pt will remain hemodynamically stable

## 2024-04-18 LAB
ALBUMIN SERPL BCP-MCNC: 2.7 G/DL (ref 3.4–5)
ALP SERPL-CCNC: 135 U/L (ref 33–136)
ALT SERPL W P-5'-P-CCNC: 31 U/L (ref 10–52)
ANION GAP SERPL CALC-SCNC: 9 MMOL/L (ref 10–20)
AST SERPL W P-5'-P-CCNC: 54 U/L (ref 9–39)
BASOPHILS # BLD AUTO: 0.05 X10*3/UL (ref 0–0.1)
BASOPHILS NFR BLD AUTO: 0.6 %
BILIRUB SERPL-MCNC: 5.2 MG/DL (ref 0–1.2)
BUN SERPL-MCNC: 9 MG/DL (ref 6–23)
CALCIUM SERPL-MCNC: 9.2 MG/DL (ref 8.6–10.3)
CHLORIDE SERPL-SCNC: 104 MMOL/L (ref 98–107)
CO2 SERPL-SCNC: 25 MMOL/L (ref 21–32)
CREAT SERPL-MCNC: 0.82 MG/DL (ref 0.5–1.3)
EGFRCR SERPLBLD CKD-EPI 2021: >90 ML/MIN/1.73M*2
EOSINOPHIL # BLD AUTO: 0.32 X10*3/UL (ref 0–0.7)
EOSINOPHIL NFR BLD AUTO: 3.5 %
ERYTHROCYTE [DISTWIDTH] IN BLOOD BY AUTOMATED COUNT: 18.1 % (ref 11.5–14.5)
GLUCOSE BLD MANUAL STRIP-MCNC: 108 MG/DL (ref 74–99)
GLUCOSE SERPL-MCNC: 101 MG/DL (ref 74–99)
HCT VFR BLD AUTO: 27.3 % (ref 41–52)
HGB BLD-MCNC: 8.9 G/DL (ref 13.5–17.5)
IMM GRANULOCYTES # BLD AUTO: 0.21 X10*3/UL (ref 0–0.7)
IMM GRANULOCYTES NFR BLD AUTO: 2.3 % (ref 0–0.9)
INR PPP: 1.6 (ref 0.9–1.1)
LYMPHOCYTES # BLD AUTO: 1.06 X10*3/UL (ref 1.2–4.8)
LYMPHOCYTES NFR BLD AUTO: 11.8 %
MAGNESIUM SERPL-MCNC: 1.78 MG/DL (ref 1.6–2.4)
MCH RBC QN AUTO: 33 PG (ref 26–34)
MCHC RBC AUTO-ENTMCNC: 32.6 G/DL (ref 32–36)
MCV RBC AUTO: 101 FL (ref 80–100)
MONOCYTES # BLD AUTO: 1.61 X10*3/UL (ref 0.1–1)
MONOCYTES NFR BLD AUTO: 17.8 %
NEUTROPHILS # BLD AUTO: 5.77 X10*3/UL (ref 1.2–7.7)
NEUTROPHILS NFR BLD AUTO: 64 %
NRBC BLD-RTO: 0 /100 WBCS (ref 0–0)
PLATELET # BLD AUTO: 99 X10*3/UL (ref 150–450)
POTASSIUM SERPL-SCNC: 3.9 MMOL/L (ref 3.5–5.3)
PROT SERPL-MCNC: 5.6 G/DL (ref 6.4–8.2)
PROTHROMBIN TIME: 17.7 SECONDS (ref 9.8–12.8)
RBC # BLD AUTO: 2.7 X10*6/UL (ref 4.5–5.9)
SODIUM SERPL-SCNC: 134 MMOL/L (ref 136–145)
WBC # BLD AUTO: 9 X10*3/UL (ref 4.4–11.3)

## 2024-04-18 PROCEDURE — 2500000001 HC RX 250 WO HCPCS SELF ADMINISTERED DRUGS (ALT 637 FOR MEDICARE OP): Performed by: INTERNAL MEDICINE

## 2024-04-18 PROCEDURE — 85025 COMPLETE CBC W/AUTO DIFF WBC: CPT

## 2024-04-18 PROCEDURE — 97110 THERAPEUTIC EXERCISES: CPT | Mod: GO

## 2024-04-18 PROCEDURE — 2500000004 HC RX 250 GENERAL PHARMACY W/ HCPCS (ALT 636 FOR OP/ED): Performed by: INTERNAL MEDICINE

## 2024-04-18 PROCEDURE — 85610 PROTHROMBIN TIME: CPT | Performed by: INTERNAL MEDICINE

## 2024-04-18 PROCEDURE — 82947 ASSAY GLUCOSE BLOOD QUANT: CPT

## 2024-04-18 PROCEDURE — 2500000001 HC RX 250 WO HCPCS SELF ADMINISTERED DRUGS (ALT 637 FOR MEDICARE OP)

## 2024-04-18 PROCEDURE — 2500000006 HC RX 250 W HCPCS SELF ADMINISTERED DRUGS (ALT 637 FOR ALL PAYERS): Performed by: INTERNAL MEDICINE

## 2024-04-18 PROCEDURE — 97530 THERAPEUTIC ACTIVITIES: CPT | Mod: GP

## 2024-04-18 PROCEDURE — C9113 INJ PANTOPRAZOLE SODIUM, VIA: HCPCS

## 2024-04-18 PROCEDURE — 2500000004 HC RX 250 GENERAL PHARMACY W/ HCPCS (ALT 636 FOR OP/ED)

## 2024-04-18 PROCEDURE — 99233 SBSQ HOSP IP/OBS HIGH 50: CPT | Performed by: INTERNAL MEDICINE

## 2024-04-18 PROCEDURE — 2500000005 HC RX 250 GENERAL PHARMACY W/O HCPCS: Performed by: INTERNAL MEDICINE

## 2024-04-18 PROCEDURE — 80053 COMPREHEN METABOLIC PANEL: CPT

## 2024-04-18 PROCEDURE — 36415 COLL VENOUS BLD VENIPUNCTURE: CPT

## 2024-04-18 PROCEDURE — 92526 ORAL FUNCTION THERAPY: CPT | Mod: GN

## 2024-04-18 PROCEDURE — 83735 ASSAY OF MAGNESIUM: CPT | Performed by: INTERNAL MEDICINE

## 2024-04-18 PROCEDURE — 2060000001 HC INTERMEDIATE ICU ROOM DAILY

## 2024-04-18 RX ORDER — GABAPENTIN 300 MG/1
300 CAPSULE ORAL NIGHTLY
Status: DISCONTINUED | OUTPATIENT
Start: 2024-04-18 | End: 2024-04-19

## 2024-04-18 RX ORDER — PANTOPRAZOLE SODIUM 40 MG/1
40 TABLET, DELAYED RELEASE ORAL
Status: DISCONTINUED | OUTPATIENT
Start: 2024-04-19 | End: 2024-04-22 | Stop reason: HOSPADM

## 2024-04-18 RX ORDER — ACETAMINOPHEN 500 MG
5 TABLET ORAL NIGHTLY PRN
Status: DISCONTINUED | OUTPATIENT
Start: 2024-04-18 | End: 2024-04-22 | Stop reason: HOSPADM

## 2024-04-18 RX ORDER — PHYTONADIONE 5 MG/1
5 TABLET ORAL ONCE
Status: COMPLETED | OUTPATIENT
Start: 2024-04-18 | End: 2024-04-18

## 2024-04-18 RX ORDER — MAGNESIUM SULFATE HEPTAHYDRATE 40 MG/ML
2 INJECTION, SOLUTION INTRAVENOUS ONCE
Status: COMPLETED | OUTPATIENT
Start: 2024-04-18 | End: 2024-04-18

## 2024-04-18 RX ORDER — ACETAMINOPHEN 325 MG/1
650 TABLET ORAL EVERY 6 HOURS PRN
Status: DISCONTINUED | OUTPATIENT
Start: 2024-04-18 | End: 2024-04-19

## 2024-04-18 RX ORDER — LEVETIRACETAM 500 MG/1
500 TABLET ORAL 2 TIMES DAILY
Status: DISCONTINUED | OUTPATIENT
Start: 2024-04-18 | End: 2024-04-22 | Stop reason: HOSPADM

## 2024-04-18 RX ADMIN — TAMSULOSIN HYDROCHLORIDE 0.4 MG: 0.4 CAPSULE ORAL at 08:57

## 2024-04-18 RX ADMIN — Medication: at 20:00

## 2024-04-18 RX ADMIN — ACETAMINOPHEN 650 MG: 325 TABLET ORAL at 20:12

## 2024-04-18 RX ADMIN — LACTULOSE 20 G: 20 SOLUTION ORAL at 20:12

## 2024-04-18 RX ADMIN — Medication 1 TABLET: at 08:51

## 2024-04-18 RX ADMIN — LEVETIRACETAM 500 MG: 5 INJECTION INTRAVENOUS at 00:19

## 2024-04-18 RX ADMIN — PANTOPRAZOLE SODIUM 40 MG: 40 INJECTION, POWDER, FOR SOLUTION INTRAVENOUS at 08:51

## 2024-04-18 RX ADMIN — FOLIC ACID 1 MG: 1 TABLET ORAL at 08:51

## 2024-04-18 RX ADMIN — LEVETIRACETAM 500 MG: 5 INJECTION INTRAVENOUS at 12:46

## 2024-04-18 RX ADMIN — METOPROLOL TARTRATE 25 MG: 25 TABLET, FILM COATED ORAL at 08:51

## 2024-04-18 RX ADMIN — Medication 5 MG: at 20:12

## 2024-04-18 RX ADMIN — LEVETIRACETAM 500 MG: 500 TABLET, FILM COATED ORAL at 20:12

## 2024-04-18 RX ADMIN — MAGNESIUM SULFATE HEPTAHYDRATE 2 G: 40 INJECTION, SOLUTION INTRAVENOUS at 08:51

## 2024-04-18 RX ADMIN — LACTULOSE 20 G: 20 SOLUTION ORAL at 15:14

## 2024-04-18 RX ADMIN — PHYTONADIONE 5 MG: 5 TABLET ORAL at 10:44

## 2024-04-18 RX ADMIN — LACTULOSE 20 G: 20 SOLUTION ORAL at 08:51

## 2024-04-18 RX ADMIN — Medication: at 23:00

## 2024-04-18 RX ADMIN — GABAPENTIN 300 MG: 300 CAPSULE ORAL at 20:12

## 2024-04-18 RX ADMIN — Medication 5 MG: at 00:15

## 2024-04-18 ASSESSMENT — COGNITIVE AND FUNCTIONAL STATUS - GENERAL
CLIMB 3 TO 5 STEPS WITH RAILING: TOTAL
TURNING FROM BACK TO SIDE WHILE IN FLAT BAD: A LITTLE
MOBILITY SCORE: 14
MOVING FROM LYING ON BACK TO SITTING ON SIDE OF FLAT BED WITH BEDRAILS: A LITTLE
MOVING TO AND FROM BED TO CHAIR: A LITTLE
MOVING TO AND FROM BED TO CHAIR: A LOT
CLIMB 3 TO 5 STEPS WITH RAILING: TOTAL
PERSONAL GROOMING: A LITTLE
TURNING FROM BACK TO SIDE WHILE IN FLAT BAD: A LITTLE
WALKING IN HOSPITAL ROOM: A LOT
WALKING IN HOSPITAL ROOM: A LOT
TOILETING: TOTAL
DRESSING REGULAR LOWER BODY CLOTHING: TOTAL
STANDING UP FROM CHAIR USING ARMS: A LOT
CLIMB 3 TO 5 STEPS WITH RAILING: A LOT
MOBILITY SCORE: 13
HELP NEEDED FOR BATHING: TOTAL
STANDING UP FROM CHAIR USING ARMS: A LOT
MOVING TO AND FROM BED TO CHAIR: A LOT
STANDING UP FROM CHAIR USING ARMS: A LOT
MOBILITY SCORE: 16
TURNING FROM BACK TO SIDE WHILE IN FLAT BAD: A LITTLE
DAILY ACTIVITIY SCORE: 12
DRESSING REGULAR UPPER BODY CLOTHING: A LOT
WALKING IN HOSPITAL ROOM: A LOT

## 2024-04-18 ASSESSMENT — PAIN SCALES - GENERAL
PAINLEVEL_OUTOF10: 7
PAINLEVEL_OUTOF10: 0 - NO PAIN
PAINLEVEL_OUTOF10: 3
PAINLEVEL_OUTOF10: 0 - NO PAIN
PAINLEVEL_OUTOF10: 1
PAINLEVEL_OUTOF10: 0 - NO PAIN

## 2024-04-18 ASSESSMENT — PAIN - FUNCTIONAL ASSESSMENT
PAIN_FUNCTIONAL_ASSESSMENT: 0-10

## 2024-04-18 ASSESSMENT — PAIN DESCRIPTION - DESCRIPTORS: DESCRIPTORS: CRAMPING

## 2024-04-18 NOTE — PROGRESS NOTES
Cardiology Progress    Impression:  ETOH intoxication/withdrawal  Right SDH.  Seen by neurosurgery.  Conservative therapy.  NSVT in the setting of hypokalemia.  No recurrence.  P. Afib/flutter. CHADVASC 2.  Now sinus rhythm.  History of GI bleeding.  Anemia.  AV block  s/p PPM  HTN  Plan:  Ambulate  Continue Lopressor  Since patient maintaining sinus rhythm no immediate indication to start anticoagulation.  However he does have paroxysmal A-fib. If patient shows compliance with medical therapy and follow-up, and avoid alcohol consumption, may be candidate for LAAO/Watchman device   HPI:  No problems overnight.  Remains sinus rhythm.  Meds:  Scheduled medications  [Held by provider] allopurinol, 300 mg, oral, Daily  folic acid, 1 mg, oral, Daily  lactulose, 20 g, oral, TID  levETIRAcetam, 500 mg, intravenous, q12h  metoprolol tartrate, 25 mg, oral, BID  multivitamin with minerals, 1 tablet, oral, Daily  oxygen, , inhalation, Continuous - Inhalation  pantoprazole, 40 mg, intravenous, Daily  tamsulosin, 0.4 mg, oral, Daily      Continuous medications     PRN medications  PRN medications: acetaminophen, melatonin    Physical exam:  Vitals:    04/18/24 0400   BP: 131/62   Pulse: 74   Resp: (!) 28   Temp: 37 °C (98.6 °F)   SpO2: 97%      No JVD.  Chest clear.  No edema.  EKG:  Telemetry shows sinus rhythm with ventricular pacing  Echo:  Normal EF  Labs:  Lab Results   Component Value Date    WBC 9.0 04/18/2024    HGB 8.9 (L) 04/18/2024    HCT 27.3 (L) 04/18/2024    PLT 99 (L) 04/18/2024    CHOL 179 09/24/2021    TRIG 138 09/24/2021    HDL 47.9 09/24/2021    ALT 31 04/18/2024    AST 54 (H) 04/18/2024     (L) 04/18/2024    K 3.9 04/18/2024     04/18/2024    CREATININE 0.82 04/18/2024    BUN 9 04/18/2024    CO2 25 04/18/2024    TSH 2.91 09/24/2021    INR 1.6 (H) 04/18/2024     par

## 2024-04-18 NOTE — PROGRESS NOTES
Physical Therapy  Physical Therapy Treatment    Patient Name: Trey Borjas  MRN: [id number]  Room: [address] [place] [address]    Today's Date: 4/18/2024  Time Calculation  Start Time: 1436  Stop Time: 1508  Time Calculation (min): 32 min       Documented by Yane Badillo SPT.   SPT under direct supervision of Evita Turner, PT, DPT during session.  Assessment/Plan   PT Assessment  PT Assessment Results: Decreased mobility  Rehab Prognosis: Good  End of Session Communication: Bedside nurse  Assessment Comment: Pt tolerated treatment well. Improvements made in pt's ability to complete bed mobility with decreased cueing and assist. Continue to assess pt's coordination and LE strength. Plan for pt to ambulate within room for the next treatment with chair follow, if appropriate.  End of Session Patient Position: Up in chair, Alarm on       PT Plan  Treatment/Interventions: Bed mobility, Transfer training, Gait training, Stair training, Strengthening, Endurance training, Therapeutic exercise, Therapeutic activity, Home exercise program, Balance training  PT Plan: Skilled PT  PT Frequency: 3 times per week  PT Discharge Recommendations: Moderate intensity level of continued care  PT - OK to Discharge: Yes (once cleared by medical team)    Current Problem:  Patient Active Problem List   Diagnosis    Male erectile disorder    Abnormal LFTs    Alcohol abuse    Benign essential HTN    Bilateral plantar fasciitis    Degeneration of intervertebral disc of lumbar region    Elevated PSA    Esophagitis    Gout    Hepatic steatosis    Neuropathy, peripheral    Pacemaker    Prostate cancer (Multi)    Spinal stenosis of lumbar region    Chronic obstructive pulmonary disease with (acute) lower respiratory infection (Multi)    Hypothyroidism    Rheumatoid arthritis (Multi)    Atrial flutter, unspecified type (Multi)     General Visit Information:   PT  Visit  PT Received On: 04/18/24  General  Reason for Referral: PT eval and treat; aflutter (HR  120s), mild metabolic acidosis, EtOH cirrhosis. Pt in EtOH withdrawal and had code blue on 4/12/24 (unresponsive, intubated, v tach). Head CT positive for SDH posterior R cerebral hemisphere, small hemorrhage R lateral aspect posterior falx. Repeat head CT stable on 4/13 and 4/14. Blood via NG tube, so Hgb transfused with goal Hgb >7. Neurosurgery consulted and rec'd no surgery and maintain SBP <140. GI consulted and EGD performed on 4/15; no active bleeding, hold off on colonoscopy.  Referred By: Elis  Past Medical History Relevant to Rehab: HTN, gout, EtOH abuse, smoking, complete heart block s/p PM, neuropathy B feet  Caregiver Feedback: Father and sister present throughout eval.  Co-Treatment: OT  Prior to Session Communication: Bedside nurse  Patient Position Received: Bed, 3 rail up, Alarm off, not on at start of session  General Comment: Pt agreeable to therapy intervention today.  Subjective   Precautions:  Precautions  Precautions Comment: Falls, PIV, tele, gallegos, 1L O2 via NC, ext cath    Vital Signs:  Vital Signs  Heart Rate: 67  SpO2: 95 %  BP:  (100/57 at start, 92/55 sitting EOB, 88/55 sitting upright in the chair, 98/56 reclined position in chair.)  Objective   Pain:  Pain Assessment  Pain Assessment: 0-10  Pain Score: 7 (feet/hands)    Cognition:  Cognition  Overall Cognitive Status: Within Functional Limits    Activity Tolerance:  Activity Tolerance  Early Mobility/Exercise Safety Screen: Proceed with mobilization - No exclusion criteria met    Treatments:  Therapeutic Exercise  Therapeutic Exercise Performed:  (Pt completes x10 quad sets bilaterally with tactile cueing for terminal knee extension. Pt completes x5 seated LAQs bilaterally with visual cueing. Pt utilizing abdominal/postural muscles to maintain upright with seated activity.)        Bed Mobility  Bed Mobility:  (Min assist x1 supine<>sit EOB with head of the bed raised.)  Ambulation/Gait Training  Ambulation/Gait Training Performed:   (Pt ambulates 2' from bed<>chair using WW and mod assist x1. Pt requires cueing for WW management and to increased step length. Increased time required for this task d/t slow gait speed.)  Transfers  Transfer:  (Mod assist x2 for STS from EOB.)          Outcome Measures:  [facility] Basic Mobility  Turning from your back to your side while in a flat bed without using bedrails: A little  Moving from lying on your back to sitting on the side of a flat bed without using bedrails: A little  Moving to and from bed to chair (including a wheelchair): A lot  Standing up from a chair using your arms (e.g. wheelchair or bedside chair): A lot  To walk in hospital room: A lot  Climbing 3-5 steps with railing: Total  Basic Mobility - Total Score: 13    FSS-ICU  Ambulation: Walks <50 feet with any assistance x1 or walks any distance with assistance x2 people  Rolling: Supervision or set-up only  Sitting: Supervision or set-up only  Transfer Sit-to-Stand: Moderate assistance (performs 50 - 74% of task)  Transfer Supine-to-Sit: Minimal assistance (performs 75% or more of task)  Total Score: 18     E = Exercise and Early Mobility  Early Mobility/Exercise Safety Screen: Proceed with mobilization - No exclusion criteria met  Current Activity: Sitting in chair     Education:  Education Documentation  Mobility Training, taught by DANDY Gregg at 4/18/2024  3:23 PM.  Learner: Patient  Readiness: Acceptance  Method: Explanation  Response: Verbalizes Understanding    Education Comments  No comments found.      Goals:     Encounter Problems       Encounter Problems (Active)       PT Problem       STG - Pt will amb 20' using LRD with CGA (Progressing)       Start:  04/16/24    Expected End:  04/30/24            STG - Pt will transition supine <> sitting with min assist x2  (Progressing)       Start:  04/16/24    Expected End:  04/30/24            STG - Pt will transfer STS with CGA  (Progressing)       Start:  04/16/24    Expected End:   04/30/24            STG - Pt will perform a B LE ther ex program of 2-3 sets of 10  (Progressing)       Start:  04/16/24    Expected End:  04/30/24

## 2024-04-18 NOTE — PROGRESS NOTES
This TCC met with patient at bedside to discuss SNF choices.  Patient states, he has not reviewed list yet.  This TCC encouraged patient to review list, with plan to return later today for follow up.  Care Transitions will continue to follow.    1:00 PM Addendum  This TCC met with patient for SNF choices.  Patient is only now reviewing list and requested I come back later.  Care Transitions will continue to follow.    3:10 pm  Addendum  This TCC met with patient for SNF choices.  Patient marked [facility] and [facility] as choices, without any preference.  Referrals sent.  Care Transitions will continue to follow.    4:00 pm Addendum  Received notification via [employer], [facility] is able to accept patient, [facility] is not able to accept.  Patient notified and agreeable to [facility] at discharge.  Care Transitions will continue to follow.

## 2024-04-18 NOTE — CARE PLAN
Problem: Pain  Goal: My pain/discomfort is manageable  Outcome: Progressing     Problem: Safety  Goal: Patient will be injury free during hospitalization  Outcome: Progressing  Goal: I will remain free of falls  Outcome: Progressing     Problem: Daily Care  Goal: Daily care needs are met  Outcome: Progressing     Problem: Psychosocial Needs  Goal: Demonstrates ability to cope with hospitalization/illness  Outcome: Progressing  Goal: Collaborate with me, my family, and caregiver to identify my specific goals  Outcome: Progressing     Problem: Discharge Barriers  Goal: My discharge needs are met  Outcome: Progressing     Problem: Arrythmia/Dysrhythmia  Goal: Lab values return to normal range  Outcome: Progressing  Goal: No evidence of post procedure complications  Outcome: Progressing  Goal: Promote self management  Outcome: Progressing  Goal: Serial ECG will return to baseline  Outcome: Progressing  Goal: Verbalize understanding of procedures/devices  Outcome: Progressing  Goal: Vital signs return to baseline  Outcome: Progressing  Goal: Care and maintenance of device (specify)  Outcome: Progressing     Problem: Respiratory  Goal: Clear secretions with interventions this shift  Outcome: Progressing  Goal: Minimize anxiety/maximize coping throughout shift  Outcome: Progressing  Goal: Minimal/no exertional discomfort or dyspnea this shift  Outcome: Progressing  Goal: No signs of respiratory distress (eg. Use of accessory muscles. Peds grunting)  Outcome: Progressing  Goal: Patent airway maintained this shift  Outcome: Progressing  Goal: Tolerate mechanical ventilation evidenced by VS/agitation level this shift  Outcome: Progressing  Goal: Tolerate pulmonary toileting this shift  Outcome: Progressing  Goal: Verbalize decreased shortness of breath this shift  Outcome: Progressing  Goal: Wean oxygen to maintain O2 saturation per order/standard this shift  Outcome: Progressing  Goal: Increase self care and/or family  involvement in next 24 hours  Outcome: Progressing     Problem: Pain - Adult  Goal: Verbalizes/displays adequate comfort level or baseline comfort level  Outcome: Progressing     Problem: Safety - Adult  Goal: Free from fall injury  Outcome: Progressing     Problem: Discharge Planning  Goal: Discharge to home or other facility with appropriate resources  Outcome: Progressing     Problem: Chronic Conditions and Co-morbidities  Goal: Patient's chronic conditions and co-morbidity symptoms are monitored and maintained or improved  Outcome: Progressing     Problem: Skin  Goal: Decreased wound size/increased tissue granulation at next dressing change  Outcome: Progressing  Flowsheets (Taken 4/18/2024 5227)  Decreased wound size/increased tissue granulation at next dressing change:   Promote sleep for wound healing   Utilize specialty bed per algorithm  Goal: Participates in plan/prevention/treatment measures  Outcome: Progressing  Goal: Prevent/manage excess moisture  Outcome: Progressing  Goal: Prevent/minimize sheer/friction injuries  Outcome: Progressing  Goal: Promote/optimize nutrition  Outcome: Progressing  Goal: Promote skin healing  Outcome: Progressing     Problem: Fall/Injury  Goal: Not fall by end of shift  Outcome: Progressing  Goal: Be free from injury by end of the shift  Outcome: Progressing  Goal: Verbalize understanding of personal risk factors for fall in the hospital  Outcome: Progressing  Goal: Verbalize understanding of risk factor reduction measures to prevent injury from fall in the home  Outcome: Progressing  Goal: Use assistive devices by end of the shift  Outcome: Progressing  Goal: Pace activities to prevent fatigue by end of the shift  Outcome: Progressing       The patient's goals for the shift include      The clinical goals for the shift include progressing

## 2024-04-18 NOTE — PROGRESS NOTES
Occupational Therapy    OT Treatment    Patient Name: Trey Borjas  MRN: [id number]  Today's Date: 4/18/2024  Time Calculation  Start Time: 1437  Stop Time: 1508  Time Calculation (min): 31 min         Assessment:  End of Session Communication: Bedside nurse  End of Session Patient Position: Up in chair, Alarm on     Plan:  Treatment Interventions: ADL retraining, Functional transfer training, UE strengthening/ROM, Endurance training, Compensatory technique education  OT Frequency: 3 times per week  OT Discharge Recommendations: Moderate intensity level of continued care  OT - OK to Discharge: Yes (to next level of care when cleared by medical team)  Treatment Interventions: ADL retraining, Functional transfer training, UE strengthening/ROM, Endurance training, Compensatory technique education    Subjective   Previous Visit Info:  OT Last Visit  OT Received On: 04/18/24  General:  General  Prior to Session Communication: Bedside nurse  Patient Position Received: Bed, 3 rail up, Alarm off, not on at start of session  General Comment: pt. agreeable to therapy intervention  Precautions:  Precautions Comment: falls, piv, tele, gallegos, o2 1 L/min, ext cath  Vital Signs:  Vital Signs  BP:  (bp 100/57 at rest supine, seated eob 98/55, following bed to chair transfer 88/55 BLE's elevated with re read at 98/56, informed RN)  Pain:  Pain Assessment  Pain Assessment:  (7/10 feet and hands)    Objective    Cognition:  Cognition  Overall Cognitive Status: Within Functional Limits     Bed Mobility/Transfers: Bed Mobility  Bed Mobility:  (min assist x 1 supine to sit)    Transfers  Transfer:  (sit<> stand from eob required mod assist x 2)      Functional Mobility:  Functional Mobility  Functional Mobility Performed:  (mobility bed to chair, cues to continue moving as pt. delays, mod assist x 1, cues for walker safety)  Therapy/Activity: Therapeutic Exercise  Therapeutic Exercise Performed:  (bue ther ex completed 5 reps each hand  pumps and bicep curls, tolerated fair at slow pace)    Outcome Measures:[facility] Daily Activity  Putting on and taking off regular lower body clothing: Total  Bathing (including washing, rinsing, drying): Total  Putting on and taking off regular upper body clothing: A lot  Toileting, which includes using toilet, bedpan or urinal: Total  Taking care of personal grooming such as brushing teeth: A little  Eating Meals: None  Daily Activity - Total Score: 12        , ICU Mobility Screen  Early Mobility/Exercise Safety Screen: Proceed with mobilization - No exclusion criteria met, and E = Exercise and Early Mobility  Early Mobility/Exercise Safety Screen: Proceed with mobilization - No exclusion criteria met  Current Activity: Sitting in chair    Education Documentation  Body Mechanics, taught by Mckayla Romo OT at 4/18/2024  4:21 PM.  Learner: Patient  Readiness: Acceptance  Method: Explanation  Response: Verbalizes Understanding, Needs Reinforcement    ADL Training, taught by Mckayla Romo OT at 4/18/2024  4:21 PM.  Learner: Patient  Readiness: Acceptance  Method: Explanation  Response: Verbalizes Understanding, Needs Reinforcement    Education Comments  No comments found.        OP EDUCATION:       Goals:  Encounter Problems       Encounter Problems (Active)       OT Goals       increase bue ther ex/activity x 7-10 minutes and increase standing tolerance x 3-5 minutes with cga to promote greater activity tolerance for assist with adl.   (Progressing)       Start:  04/16/24    Expected End:  04/20/24            Increase functional mobility and  functional transfers to cga for bed/chair/toilet with dme prn   (Progressing)       Start:  04/16/24    Expected End:  04/20/24            Increase grooming/ub bathing to cga with dme prn  (Progressing)       Start:  04/16/24    Expected End:  04/20/24              Pt will maintain attention >8 min with min cueing for active participation in functional tasks in preparation for  ADLs and continue to be cam (-) during hospital stay (Progressing)       Start:  04/16/24    Expected End:  04/20/24            Increase toileting to cga with dme prn  (Progressing)       Start:  04/16/24    Expected End:  04/20/24                   Increased time required for skilled line/ICU equipment management and room setup to ensure pt safety during mobility in ICU setting. Skilled monitoring of vital signs and symptoms continuously throughout session to ensure hemodynamic stability and physiological responses to activity with progression of session. Provided continuous verbal/tactile cues for activity pacing and initiation of therapeutic rest breaks to recover from exertion.

## 2024-04-18 NOTE — PROGRESS NOTES
"Speech-Language Pathology    SLP Adult Inpatient  Speech-Language Pathology Treatment     Patient Name: Trey Borjas  MRN: [id number]  Today's Date: 4/18/2024  Time Calculation  Start Time: 1310  Stop Time: 1330  Time Calculation (min): 20 min         Current Problem:   1. Atrial flutter, unspecified type (Multi)  Transthoracic Echo (TTE) Complete    Transthoracic Echo (TTE) Complete      2. Alcohol abuse        3. Hypokalemia        4. High anion gap metabolic acidosis        5. Elevated liver enzymes        6. Other polyneuropathy  gabapentin (Neurontin) 100 mg capsule      7. Hematemesis, unspecified whether nausea present  EGD    EGD    EGD      8. Esophagitis  EGD            SLP Assessment:  SLP TX Intervention Outcome: Making Progress Towards Goals  Prognosis: Excellent  Treatment Provided: Yes across the lunch meal for a meal analysis.   Pt alert, improved mentation with grogginess resolved.   Good oral skills -oral bolus manipulation and formation skills demonstrated across the hard solids ( grilled meat chicken x 8 pieces )   Treatment Tolerance: Patient tolerated treatment well  Medical Staff Made Aware: Yes  Strengths: Family/Caregiver Suppport, Motivation    Improved Oral phase of the swallow with pt able to fully masticate grilled chicken pieces self cut , carrots , and fruit pieces with no oral residues.   Min slow mastication effort with complete oral bolus recollection skills demonstrated across the PO self administered .  Pt states, \" I like to take my time.\"    Spoke to the pt's brother Hugo over the phone who states he has noticed a huge improvement since Monday.   Pt now fully alert, Ox4 and able to converse -recall the past events.   Oral dysphagia resolved with a functional oropharyngeal swallow demonstrated this date with oral diet recommendations to be advanced to RG7/thins ( regular solid). Pt aware to chew solids well before taking the next bite. No s/sx of aspiration across thins by straw " before, during, and after the solid consumption.    Plan:  Inpatient/Swing Bed or Outpatient: Inpatient  Treatment/Interventions:  (Oral Dysphagia )   SLP TX Plan: Discharge from Speech Therapy    Discussed POC: Patient, family, Nursing, Physician  Discussed Risks/Benefits: Cam Muhammad     Dysphagia Goals ( established 4/16/ 24)   Pt will tolerate the LRD with adequate oral phase and no s/s of aspiration across PO.  Goal met 4/18 for regular solids /thins .  Pt/caregiver /family will adhere to safe feeding/swallowing strategies during PO intake with > 95 % accuracy given min cues.  Goal met 4/18 with pt aware to take his time eating and to chew well clearing mouth before the next bite.  Tolerate advancing PO regular solid trials as grogginess improves.  GoaL met for meats -harder solids with diet advanced to Regular solids this date 4/18/24. Grogginess resolved.         Subjective   Current Problem:  Improved oral phase -prep skills across hard and soft textured PO.     Most Recent Visit:  SLP Received On: 04/18/24    General Visit Information:    RR 14 , Spo2 91 % on 2 liters      Impression:   Working diagnoses.   ETOH intoxication/withdrawal  Right SDH.  Seen by neurosurgery.  Conservative therapy.  NSVT in the setting of hypokalemia.  No recurrence.  P. Afib/flutter. CHADVASC 2.  Now sinus rhythm.  History of GI bleeding.  Anemia.  AV block  s/p PPM  HTN    Pain Assessment:   Pain Assessment: 0-10  Pain Score: 0 - No pain      Objective       Therapeutic Swallow:  Therapeutic Swallow Intervention : Compensatory Strategies, PO Trials, Caregiver Education  Solid Diet Recommendations: Regular (IDDSI Level 7)  Liquid Diet Recommendations: Thin (IDDSI Level 0)  Swallow Comments: Improved consistent alertness  Inpatient:  Education Documentation  SLP has provided pt and the pt's brother Hugo /BRENNA Muhammad with the results and recommendations of this session for an oral diet upgrade to Regular solids.        Consultations/Referrals/Coordination of Services: n/a

## 2024-04-18 NOTE — PROGRESS NOTES
"Hospital Medicine Progress Note    Subjective:  Trey Borjas is a 64 y.o. male, who is hospital day 6.     Patient is feeling better today. He states he ate all of his dinner yesterday and breakfast today without issue. No new concerns besides feeling weaker than baseline.    Objective:    /62   Pulse 74   Temp 37 °C (98.6 °F) (Temporal)   Resp (!) 28   Ht 1.829 m (6' 0.01\")   Wt 97.4 kg (214 lb 11.7 oz)   SpO2 97%   BMI 29.12 kg/m²     Physical Exam:  GENERAL: A&Ox3, no distress, cooperative  HEENT: NC/AT, EOMI, clear sclera, MMM  NECK: Supple  CARDIOVASCULAR: RRR, no murmurs. S1/S2  RESPIRATORY: CTAB, no RRW or crackles. No distress  ABDOMEN: Soft, ND, non-tender. No rebound or guarding. BS+  EXTREMITIES: No peripheral edema  NEUROLOGICAL: No focal deficits  SKIN: Warm and dry, no rashes  PSYCH: appropriate mood and affect    I personally reviewed all imaging, labs and notes/ documentation.     Assessment & Plan:    S/p Cardiac arrest (pVtach/ torsades)  PAF/ a flutter  Acute SDH  Acute metabolic encephalopathy, possible hepatic/ wernicke encephalopathy, resolved  Acute respiratory failure 2/2 cardiac arrest, improved  Acute gastritis   Hepatic cirrhosis  Transaminitis    Alcohol use disorder  T2DM with peripheral neuropathy   AV block s/p pacemaker  HTN, HLD    ICU signed off, extubated on 4/15, now on 3L NC- weaning as able  Cards following, cont BB, pt currently maintaining SR so no immediate indication to start AC, may be a candidate for LAAO/Watchman device  CTH with small R posterior SDH with mild mass effect, stable on repeat imaging, neurosurg following recommend platelet goal >75, INR <1.6, keppra for seizure prophylaxis  INR 1.6 again today, vit K tablet x1 dose  GI signed off, EGD 4/15 with gastritis, no plans for colonoscopy at this time, continue PPI, lactulose, monitor hgb, s/p 1 unit pRBCs on 4/15, s/p 1 unit platelets on 4/14. Diet advanced per SLP recs given no further GI " intervention  Thiamine, folate  PT/OT, SLP following  Continue flomax, resume home gabapentin for neuropathic pain    DVT Prophylaxis: SCDs only  Code Status: Full Code   Disposition: Cardiac ICU, pending transfer to [facility], planning for SNF on dc      Martina Becker DO  Internal Medicine/ Hospitalist

## 2024-04-19 LAB
ALBUMIN SERPL BCP-MCNC: 2.6 G/DL (ref 3.4–5)
ALP SERPL-CCNC: 149 U/L (ref 33–136)
ALT SERPL W P-5'-P-CCNC: 29 U/L (ref 10–52)
ANION GAP SERPL CALC-SCNC: 11 MMOL/L (ref 10–20)
AST SERPL W P-5'-P-CCNC: 50 U/L (ref 9–39)
ATRIAL RATE: 117 BPM
ATRIAL RATE: 125 BPM
BILIRUB SERPL-MCNC: 4.1 MG/DL (ref 0–1.2)
BUN SERPL-MCNC: 11 MG/DL (ref 6–23)
CALCIUM SERPL-MCNC: 8.7 MG/DL (ref 8.6–10.3)
CHLORIDE SERPL-SCNC: 106 MMOL/L (ref 98–107)
CO2 SERPL-SCNC: 23 MMOL/L (ref 21–32)
CREAT SERPL-MCNC: 0.77 MG/DL (ref 0.5–1.3)
DIASTOLIC BLOOD PRESSURE: 55 MMHG
EGFRCR SERPLBLD CKD-EPI 2021: >90 ML/MIN/1.73M*2
ERYTHROCYTE [DISTWIDTH] IN BLOOD BY AUTOMATED COUNT: 18 % (ref 11.5–14.5)
GLUCOSE SERPL-MCNC: 108 MG/DL (ref 74–99)
HCT VFR BLD AUTO: 26.8 % (ref 41–52)
HGB BLD-MCNC: 8.7 G/DL (ref 13.5–17.5)
INR PPP: 1.6 (ref 0.9–1.1)
MAGNESIUM SERPL-MCNC: 1.99 MG/DL (ref 1.6–2.4)
MCH RBC QN AUTO: 32.8 PG (ref 26–34)
MCHC RBC AUTO-ENTMCNC: 32.5 G/DL (ref 32–36)
MCV RBC AUTO: 101 FL (ref 80–100)
NRBC BLD-RTO: 0 /100 WBCS (ref 0–0)
P AXIS: 83 DEGREES
PLATELET # BLD AUTO: 97 X10*3/UL (ref 150–450)
POTASSIUM SERPL-SCNC: 3.5 MMOL/L (ref 3.5–5.3)
PROT SERPL-MCNC: 5.6 G/DL (ref 6.4–8.2)
PROTHROMBIN TIME: 18.3 SECONDS (ref 9.8–12.8)
Q ONSET: 210 MS
Q ONSET: 221 MS
QRS COUNT: 17 BEATS
QRS COUNT: 18 BEATS
QRS DURATION: 124 MS
QRS DURATION: 144 MS
QT INTERVAL: 410 MS
QT INTERVAL: 412 MS
QTC CALCULATION(BAZETT): 539 MS
QTC CALCULATION(BAZETT): 544 MS
QTC FREDERICIA: 493 MS
QTC FREDERICIA: 495 MS
R AXIS: 38 DEGREES
R AXIS: 55 DEGREES
RBC # BLD AUTO: 2.65 X10*6/UL (ref 4.5–5.9)
SODIUM SERPL-SCNC: 136 MMOL/L (ref 136–145)
SYSTOLIC BLOOD PRESSURE: 103 MMHG
T AXIS: 115 DEGREES
T AXIS: 34 DEGREES
T OFFSET: 416 MS
T OFFSET: 426 MS
VENTRICULAR RATE: 103 BPM
VENTRICULAR RATE: 106 BPM
WBC # BLD AUTO: 6.7 X10*3/UL (ref 4.4–11.3)

## 2024-04-19 PROCEDURE — 2500000006 HC RX 250 W HCPCS SELF ADMINISTERED DRUGS (ALT 637 FOR ALL PAYERS): Performed by: INTERNAL MEDICINE

## 2024-04-19 PROCEDURE — 2500000001 HC RX 250 WO HCPCS SELF ADMINISTERED DRUGS (ALT 637 FOR MEDICARE OP): Performed by: INTERNAL MEDICINE

## 2024-04-19 PROCEDURE — 80053 COMPREHEN METABOLIC PANEL: CPT | Performed by: INTERNAL MEDICINE

## 2024-04-19 PROCEDURE — 2500000001 HC RX 250 WO HCPCS SELF ADMINISTERED DRUGS (ALT 637 FOR MEDICARE OP)

## 2024-04-19 PROCEDURE — 85027 COMPLETE CBC AUTOMATED: CPT | Performed by: INTERNAL MEDICINE

## 2024-04-19 PROCEDURE — 36415 COLL VENOUS BLD VENIPUNCTURE: CPT | Performed by: INTERNAL MEDICINE

## 2024-04-19 PROCEDURE — 99232 SBSQ HOSP IP/OBS MODERATE 35: CPT | Performed by: INTERNAL MEDICINE

## 2024-04-19 PROCEDURE — 2500000005 HC RX 250 GENERAL PHARMACY W/O HCPCS: Performed by: INTERNAL MEDICINE

## 2024-04-19 PROCEDURE — 83735 ASSAY OF MAGNESIUM: CPT | Performed by: INTERNAL MEDICINE

## 2024-04-19 PROCEDURE — 2060000001 HC INTERMEDIATE ICU ROOM DAILY

## 2024-04-19 PROCEDURE — 85610 PROTHROMBIN TIME: CPT | Performed by: INTERNAL MEDICINE

## 2024-04-19 RX ORDER — PHYTONADIONE 5 MG/1
5 TABLET ORAL ONCE
Status: COMPLETED | OUTPATIENT
Start: 2024-04-19 | End: 2024-04-19

## 2024-04-19 RX ORDER — DICLOFENAC SODIUM 10 MG/G
4 GEL TOPICAL 2 TIMES DAILY
Status: DISCONTINUED | OUTPATIENT
Start: 2024-04-19 | End: 2024-04-22 | Stop reason: HOSPADM

## 2024-04-19 RX ORDER — GABAPENTIN 100 MG/1
200 CAPSULE ORAL 3 TIMES DAILY
Status: DISCONTINUED | OUTPATIENT
Start: 2024-04-19 | End: 2024-04-22 | Stop reason: HOSPADM

## 2024-04-19 RX ORDER — ACETAMINOPHEN 325 MG/1
650 TABLET ORAL EVERY 6 HOURS PRN
Status: DISCONTINUED | OUTPATIENT
Start: 2024-04-19 | End: 2024-04-19

## 2024-04-19 RX ORDER — METOPROLOL TARTRATE 25 MG/1
12.5 TABLET, FILM COATED ORAL 2 TIMES DAILY
Status: DISCONTINUED | OUTPATIENT
Start: 2024-04-19 | End: 2024-04-20

## 2024-04-19 RX ORDER — ACETAMINOPHEN 325 MG/1
650 TABLET ORAL EVERY 6 HOURS PRN
Status: DISCONTINUED | OUTPATIENT
Start: 2024-04-19 | End: 2024-04-22 | Stop reason: HOSPADM

## 2024-04-19 RX ORDER — ACETAMINOPHEN 325 MG/1
650 TABLET ORAL EVERY 6 HOURS PRN
Status: CANCELLED | OUTPATIENT
Start: 2024-04-19

## 2024-04-19 RX ORDER — POTASSIUM CHLORIDE 1.5 G/1.58G
40 POWDER, FOR SOLUTION ORAL ONCE
Status: COMPLETED | OUTPATIENT
Start: 2024-04-19 | End: 2024-04-19

## 2024-04-19 RX ADMIN — GABAPENTIN 200 MG: 100 CAPSULE ORAL at 20:27

## 2024-04-19 RX ADMIN — GABAPENTIN 200 MG: 100 CAPSULE ORAL at 14:58

## 2024-04-19 RX ADMIN — ACETAMINOPHEN 650 MG: 325 TABLET ORAL at 01:48

## 2024-04-19 RX ADMIN — METOPROLOL TARTRATE 25 MG: 25 TABLET, FILM COATED ORAL at 08:36

## 2024-04-19 RX ADMIN — ACETAMINOPHEN 650 MG: 325 TABLET ORAL at 07:41

## 2024-04-19 RX ADMIN — PHYTONADIONE 5 MG: 5 TABLET ORAL at 10:41

## 2024-04-19 RX ADMIN — DICLOFENAC SODIUM 4 G: 10 GEL TOPICAL at 12:39

## 2024-04-19 RX ADMIN — Medication 5 MG: at 20:27

## 2024-04-19 RX ADMIN — METOPROLOL TARTRATE 12.5 MG: 25 TABLET, FILM COATED ORAL at 20:28

## 2024-04-19 RX ADMIN — POLYVINYL ALCOHOL, POVIDONE 1 DROP: 14; 6 SOLUTION/ DROPS OPHTHALMIC at 10:41

## 2024-04-19 RX ADMIN — POTASSIUM CHLORIDE 40 MEQ: 1.5 POWDER, FOR SOLUTION ORAL at 07:41

## 2024-04-19 RX ADMIN — DICLOFENAC SODIUM 4 G: 10 GEL TOPICAL at 20:28

## 2024-04-19 RX ADMIN — LEVETIRACETAM 500 MG: 500 TABLET, FILM COATED ORAL at 20:27

## 2024-04-19 RX ADMIN — LACTULOSE 20 G: 20 SOLUTION ORAL at 08:36

## 2024-04-19 RX ADMIN — Medication 1 TABLET: at 08:36

## 2024-04-19 RX ADMIN — POLYVINYL ALCOHOL, POVIDONE 1 DROP: 14; 6 SOLUTION/ DROPS OPHTHALMIC at 20:28

## 2024-04-19 RX ADMIN — TAMSULOSIN HYDROCHLORIDE 0.4 MG: 0.4 CAPSULE ORAL at 08:35

## 2024-04-19 RX ADMIN — LEVETIRACETAM 500 MG: 500 TABLET, FILM COATED ORAL at 08:36

## 2024-04-19 RX ADMIN — FOLIC ACID 1 MG: 1 TABLET ORAL at 08:35

## 2024-04-19 RX ADMIN — LACTULOSE 20 G: 20 SOLUTION ORAL at 14:58

## 2024-04-19 RX ADMIN — PANTOPRAZOLE SODIUM 40 MG: 40 TABLET, DELAYED RELEASE ORAL at 06:40

## 2024-04-19 ASSESSMENT — PAIN - FUNCTIONAL ASSESSMENT
PAIN_FUNCTIONAL_ASSESSMENT: 0-10

## 2024-04-19 ASSESSMENT — PAIN SCALES - GENERAL
PAINLEVEL_OUTOF10: 2
PAINLEVEL_OUTOF10: 0 - NO PAIN
PAINLEVEL_OUTOF10: 6
PAINLEVEL_OUTOF10: 3
PAINLEVEL_OUTOF10: 0 - NO PAIN

## 2024-04-19 ASSESSMENT — PAIN DESCRIPTION - DESCRIPTORS
DESCRIPTORS: SORE
DESCRIPTORS: CRAMPING

## 2024-04-19 ASSESSMENT — PAIN DESCRIPTION - LOCATION: LOCATION: GENERALIZED

## 2024-04-19 NOTE — PROGRESS NOTES
"Nutrition Follow Up Assessment:   Nutrition Assessment         Nutrition History:  Energy Intake: Good > 75 %  Food and Nutrient History: Advanced to regular diet and eating well, per pt report. Physician note states pt reported eating 100% of dinner on 4/17 and 100% of breakfast on 4/18 as well. Reports his family is bringing him in a sub sandwich later today. Reports having some nausea in the hospital but denies N/V/D/C at home. Seen by SLP.  Food Allergies/Intolerances:  None  GI Symptoms: Nausea - mild  Oral Problems: None       Anthropometrics:  Height: 182.9 cm (6' 0.01\")   Weight: 93.4 kg (205 lb 14.6 oz)   BMI (Calculated): 27.92  IBW/kg (Dietitian Calculated): 80.9 kg          Weight History:     Weight Change %:  Weight History / % Weight Change: Pt rpeorts UBW is 212# at the doctor's office, but states he usually has heavy clothes/gear on so true wt is likely less than 212#. New wt 93.4 kg on 4/19/24 - 5.2 kg wt loss x 4 days, 1.6 kg wt loss x 7 days. Does have net negative fluid balance.    Nutrition Focused Physical Exam Findings:  defer: not indicated at this time  Subcutaneous Fat Loss:   Orbital Fat Pads: Defer  Muscle Wasting:  Temporalis: Defer  Edema:  Edema: none  Physical Findings:  Skin: Positive (right elbow skin tear, per nursing assessment)    Nutrition Significant Labs:    Reviewed   Nutrition Specific Medications:  Reviewed     I/O:   Last BM Date: 04/18/24; Stool Appearance: Soft (04/18/24 2000)    Dietary Orders (From admission, onward)       Start     Ordered    04/18/24 1326  Adult diet Regular  Diet effective now        Question:  Diet type  Answer:  Regular    04/18/24 1326                     Estimated Needs:   Total Energy Estimated Needs (kCal): 1868 kCal  Method for Estimating Needs: 20 kcal/kg ABW  Total Protein Estimated Needs (g): 81 g  Method for Estimating Needs: 1 g/kg IBW  Total Fluid Estimated Needs (mL): 1868 mL  Method for Estimating Needs: 1 ml/kcal or per MD    "     Nutrition Diagnosis   Malnutrition Diagnosis  Patient has Malnutrition Diagnosis:  (unable to determine at this time)    Nutrition Diagnosis  Patient has Nutrition Diagnosis: Yes  Diagnosis Status (1): Resolved  Nutrition Diagnosis 1: Inadequate protein energy intake  Related to (1): mechanical ventilation, GI issues, hospital course  As Evidenced by (1): NPO and no nutrition support running  Additional Assessment Information (1): Diet advanced and pt eating well       Nutrition Interventions/Recommendations         Nutrition Prescription:  Individualized Nutrition Prescription Provided for : Regular diet        Nutrition Interventions:   Interventions: Meals and snacks  Meals and Snacks: General healthful diet  Goal: Consumes 3 meals per day  Vitamin Supplement Therapy: Folate, Thiamin, Other (Comment)  Goal: Continue daily vitamin intake         Nutrition Education:   Patient with no diet related questions at this time.         Nutrition Monitoring and Evaluation   Food/Nutrient Related History Monitoring  Monitoring and Evaluation Plan: Energy intake, Amount of food, Fluid intake, Vitamin intake  Energy Intake: Estimated energy intake  Criteria: Pt meets >75% of estimated energy needs - met  Fluid Intake: Estimated fluid intake  Criteria: Meets >75% of estimated fluid needs - met  Amount of Food: Estimated amout of food  Criteria: Pt consumes >75% of meal - met  Vitamin Intake: Measured vitamin intake  Criteria: Pt consumes prescribed vitamins daily - still has active orders for supplemental vitamins    Body Composition/Growth/Weight History  Monitoring and Evaluation Plan: Weight  Weight: Measured weight  Criteria: Maintains stable weight - not met, some wt loss observed    Biochemical Data, Medical Tests and Procedures  Monitoring and Evaluation Plan: Glucose/endocrine profile, Electrolyte/renal panel  Electrolyte and Renal Panel: Sodium, Potassium, Phosphorus  Criteria: Electrolytes WNL -  met  Glucose/Endocrine Profile: Glucose, casual  Criteria: BG within desirable range - met            Time Spent/Follow-up Reminder:   Time Spent (min): 30 minutes  Last Date of Nutrition Visit: 04/19/24  Nutrition Follow-Up Needed?: 5-7 days  Follow up Comment: 4/24-4/25 AC

## 2024-04-19 NOTE — PROGRESS NOTES
"Hospital Medicine Progress Note    Subjective:  Trey Borjas is a 64 y.o. male, who is hospital day 7.     Patient tired today from not sleeping well overnight due to neuropathy in his BL feet and right hand. States he has been OOB in the chair. Tolerating diet, having Bms. No additional concerns.    Objective:    /64   Pulse 78   Temp 36.5 °C (97.7 °F) (Temporal)   Resp 12   Ht 1.829 m (6' 0.01\")   Wt 93.4 kg (205 lb 14.6 oz)   SpO2 93%   BMI 27.92 kg/m²     Physical Exam:  GENERAL: A&Ox3, no distress, cooperative  HEENT: NC/AT, EOMI, clear sclera, MMM  NECK: Supple  CARDIOVASCULAR: RRR, no murmurs. S1/S2  RESPIRATORY: CTAB, no RRW or crackles. No distress  ABDOMEN: Soft, ND, non-tender. No rebound or guarding. BS+  EXTREMITIES: No peripheral edema  NEUROLOGICAL: No focal deficits  SKIN: Warm and dry, no rashes  PSYCH: appropriate mood and affect    I personally reviewed all imaging, labs and notes/ documentation.     Assessment & Plan:    S/p Cardiac arrest (pVtach/ torsades)  PAF/ a flutter  Acute SDH  Acute metabolic encephalopathy, possible hepatic/ wernicke encephalopathy, resolved  Acute respiratory failure 2/2 cardiac arrest, improved  Acute gastritis   Hepatic cirrhosis  Transaminitis    Alcohol use disorder  T2DM with peripheral neuropathy   AV block s/p pacemaker  HTN, HLD    ICU signed off, extubated on 4/15, weaned to room air   Cards following, BP soft, discussed dec BB dose & cards ok with it, pt currently maintaining SR so no immediate indication to start AC, may be a candidate for LAAO/Watchman device  CTH with small R posterior SDH with mild mass effect, stable on repeat imaging, neurosurg following recommend platelet goal >75, INR <1.6, keppra for seizure prophylaxis  INR 1.6 again today, vit K tablet x1 dose  GI signed off, EGD 4/15 with gastritis, no plans for colonoscopy at this time, continue PPI, lactulose, monitor hgb, s/p 1 unit pRBCs on 4/15, s/p 1 unit platelets on 4/14. " Diet advanced per SLP recs given no further GI intervention  Thiamine, folate, off CIWA  PT/OT, SLP following  Continue flomax, gabapentin dose up titrated, voltaren for neuropathy    DVT Prophylaxis: SCDs only, OOB  Code Status: Full Code   Disposition: Cardiac ICU, pending transfer to [facility], planning for SNF on dc      Martina Becker DO  Internal Medicine/ Hospitalist

## 2024-04-20 LAB
ALBUMIN SERPL BCP-MCNC: 2.8 G/DL (ref 3.4–5)
ALP SERPL-CCNC: 186 U/L (ref 33–136)
ALT SERPL W P-5'-P-CCNC: 31 U/L (ref 10–52)
AMMONIA PLAS-SCNC: 91 UMOL/L (ref 16–53)
ANION GAP SERPL CALC-SCNC: 12 MMOL/L (ref 10–20)
AST SERPL W P-5'-P-CCNC: 52 U/L (ref 9–39)
BILIRUB SERPL-MCNC: 3.7 MG/DL (ref 0–1.2)
BUN SERPL-MCNC: 11 MG/DL (ref 6–23)
CALCIUM SERPL-MCNC: 8.8 MG/DL (ref 8.6–10.3)
CHLORIDE SERPL-SCNC: 108 MMOL/L (ref 98–107)
CO2 SERPL-SCNC: 22 MMOL/L (ref 21–32)
CREAT SERPL-MCNC: 0.81 MG/DL (ref 0.5–1.3)
EGFRCR SERPLBLD CKD-EPI 2021: >90 ML/MIN/1.73M*2
ERYTHROCYTE [DISTWIDTH] IN BLOOD BY AUTOMATED COUNT: 18.1 % (ref 11.5–14.5)
GLUCOSE SERPL-MCNC: 138 MG/DL (ref 74–99)
HCT VFR BLD AUTO: 27.5 % (ref 41–52)
HGB BLD-MCNC: 9 G/DL (ref 13.5–17.5)
INR PPP: 1.6 (ref 0.9–1.1)
MAGNESIUM SERPL-MCNC: 1.86 MG/DL (ref 1.6–2.4)
MCH RBC QN AUTO: 33.2 PG (ref 26–34)
MCHC RBC AUTO-ENTMCNC: 32.7 G/DL (ref 32–36)
MCV RBC AUTO: 102 FL (ref 80–100)
NRBC BLD-RTO: 0 /100 WBCS (ref 0–0)
PLATELET # BLD AUTO: 99 X10*3/UL (ref 150–450)
POTASSIUM SERPL-SCNC: 3.9 MMOL/L (ref 3.5–5.3)
PROT SERPL-MCNC: 5.9 G/DL (ref 6.4–8.2)
PROTHROMBIN TIME: 18 SECONDS (ref 9.8–12.8)
RBC # BLD AUTO: 2.71 X10*6/UL (ref 4.5–5.9)
SODIUM SERPL-SCNC: 138 MMOL/L (ref 136–145)
WBC # BLD AUTO: 6.4 X10*3/UL (ref 4.4–11.3)

## 2024-04-20 PROCEDURE — 2500000001 HC RX 250 WO HCPCS SELF ADMINISTERED DRUGS (ALT 637 FOR MEDICARE OP): Performed by: INTERNAL MEDICINE

## 2024-04-20 PROCEDURE — 36415 COLL VENOUS BLD VENIPUNCTURE: CPT | Performed by: INTERNAL MEDICINE

## 2024-04-20 PROCEDURE — 2500000005 HC RX 250 GENERAL PHARMACY W/O HCPCS: Performed by: INTERNAL MEDICINE

## 2024-04-20 PROCEDURE — 2060000001 HC INTERMEDIATE ICU ROOM DAILY

## 2024-04-20 PROCEDURE — 80053 COMPREHEN METABOLIC PANEL: CPT | Performed by: INTERNAL MEDICINE

## 2024-04-20 PROCEDURE — 85610 PROTHROMBIN TIME: CPT | Performed by: INTERNAL MEDICINE

## 2024-04-20 PROCEDURE — 83735 ASSAY OF MAGNESIUM: CPT | Performed by: INTERNAL MEDICINE

## 2024-04-20 PROCEDURE — 85027 COMPLETE CBC AUTOMATED: CPT | Performed by: INTERNAL MEDICINE

## 2024-04-20 PROCEDURE — 2500000006 HC RX 250 W HCPCS SELF ADMINISTERED DRUGS (ALT 637 FOR ALL PAYERS): Performed by: INTERNAL MEDICINE

## 2024-04-20 PROCEDURE — 82140 ASSAY OF AMMONIA: CPT | Performed by: INTERNAL MEDICINE

## 2024-04-20 PROCEDURE — 99233 SBSQ HOSP IP/OBS HIGH 50: CPT | Performed by: INTERNAL MEDICINE

## 2024-04-20 PROCEDURE — 2500000004 HC RX 250 GENERAL PHARMACY W/ HCPCS (ALT 636 FOR OP/ED): Performed by: INTERNAL MEDICINE

## 2024-04-20 RX ORDER — METOPROLOL TARTRATE 25 MG/1
25 TABLET, FILM COATED ORAL 2 TIMES DAILY
Status: DISCONTINUED | OUTPATIENT
Start: 2024-04-20 | End: 2024-04-21

## 2024-04-20 RX ORDER — PHYTONADIONE 5 MG/1
10 TABLET ORAL ONCE
Status: COMPLETED | OUTPATIENT
Start: 2024-04-20 | End: 2024-04-20

## 2024-04-20 RX ORDER — MAGNESIUM SULFATE HEPTAHYDRATE 40 MG/ML
2 INJECTION, SOLUTION INTRAVENOUS ONCE
Status: COMPLETED | OUTPATIENT
Start: 2024-04-20 | End: 2024-04-20

## 2024-04-20 RX ORDER — LACTULOSE 10 G/15ML
20 SOLUTION ORAL 2 TIMES DAILY
Status: DISCONTINUED | OUTPATIENT
Start: 2024-04-20 | End: 2024-04-21

## 2024-04-20 RX ADMIN — GABAPENTIN 200 MG: 100 CAPSULE ORAL at 09:37

## 2024-04-20 RX ADMIN — POLYVINYL ALCOHOL, POVIDONE 1 DROP: 14; 6 SOLUTION/ DROPS OPHTHALMIC at 09:41

## 2024-04-20 RX ADMIN — LEVETIRACETAM 500 MG: 500 TABLET, FILM COATED ORAL at 09:38

## 2024-04-20 RX ADMIN — TAMSULOSIN HYDROCHLORIDE 0.4 MG: 0.4 CAPSULE ORAL at 09:36

## 2024-04-20 RX ADMIN — LEVETIRACETAM 500 MG: 500 TABLET, FILM COATED ORAL at 21:11

## 2024-04-20 RX ADMIN — FOLIC ACID 1 MG: 1 TABLET ORAL at 09:36

## 2024-04-20 RX ADMIN — ALLOPURINOL 300 MG: 300 TABLET ORAL at 09:40

## 2024-04-20 RX ADMIN — POLYVINYL ALCOHOL, POVIDONE 1 DROP: 14; 6 SOLUTION/ DROPS OPHTHALMIC at 21:11

## 2024-04-20 RX ADMIN — GABAPENTIN 200 MG: 100 CAPSULE ORAL at 21:11

## 2024-04-20 RX ADMIN — METOPROLOL TARTRATE 12.5 MG: 25 TABLET, FILM COATED ORAL at 09:38

## 2024-04-20 RX ADMIN — MAGNESIUM SULFATE HEPTAHYDRATE 2 G: 40 INJECTION, SOLUTION INTRAVENOUS at 09:42

## 2024-04-20 RX ADMIN — GABAPENTIN 200 MG: 100 CAPSULE ORAL at 16:39

## 2024-04-20 RX ADMIN — PHYTONADIONE 10 MG: 5 TABLET ORAL at 12:23

## 2024-04-20 RX ADMIN — PANTOPRAZOLE SODIUM 40 MG: 40 TABLET, DELAYED RELEASE ORAL at 09:37

## 2024-04-20 RX ADMIN — DICLOFENAC SODIUM 4 G: 10 GEL TOPICAL at 09:41

## 2024-04-20 RX ADMIN — LACTULOSE 20 G: 20 SOLUTION ORAL at 09:35

## 2024-04-20 RX ADMIN — Medication 1 TABLET: at 09:36

## 2024-04-20 RX ADMIN — DICLOFENAC SODIUM 4 G: 10 GEL TOPICAL at 21:11

## 2024-04-20 RX ADMIN — METOPROLOL TARTRATE 25 MG: 25 TABLET, FILM COATED ORAL at 21:11

## 2024-04-20 ASSESSMENT — PAIN - FUNCTIONAL ASSESSMENT
PAIN_FUNCTIONAL_ASSESSMENT: 0-10
PAIN_FUNCTIONAL_ASSESSMENT: CPOT (CRITICAL CARE PAIN OBSERVATION TOOL)
PAIN_FUNCTIONAL_ASSESSMENT: 0-10

## 2024-04-20 ASSESSMENT — PAIN SCALES - GENERAL
PAINLEVEL_OUTOF10: 0 - NO PAIN
PAINLEVEL_OUTOF10: 0 - NO PAIN
PAINLEVEL_OUTOF10: 6
PAINLEVEL_OUTOF10: 0 - NO PAIN
PAINLEVEL_OUTOF10: 0 - NO PAIN

## 2024-04-20 ASSESSMENT — PAIN DESCRIPTION - DESCRIPTORS: DESCRIPTORS: SORE

## 2024-04-20 NOTE — PROGRESS NOTES
Received update from attending that patient medically ready for discharge. Message sent to [facility] to update, [facility] updated that pre-cert cannot be started until Monday. Requested for therapy to work with patient tomorrow for pre-cert.

## 2024-04-20 NOTE — PROGRESS NOTES
"Hospital Medicine Progress Note    Subjective:  Trey Borjas is a 64 y.o. male, who is hospital day 8.     Patient is feeling better today, neuropathic pain improved with the voltaren. He has been ambulatory in his room with assistance and starting to feel stronger. He denies any specific concerns today. Tolerating oral intake and having BMs.    Objective:    /59   Pulse 84   Temp 36.8 °C (98.2 °F)   Resp 20   Ht 1.829 m (6' 0.01\")   Wt 93.4 kg (205 lb 14.6 oz)   SpO2 94%   BMI 27.92 kg/m²     Physical Exam:  GENERAL: A&Ox3, no distress, cooperative  HEENT: NC/AT, EOMI, clear sclera, MMM  NECK: Supple  CARDIOVASCULAR: RRR, no murmurs. S1/S2  RESPIRATORY: CTAB, no RRW or crackles. No distress  ABDOMEN: Soft, ND, non-tender. No rebound or guarding. BS+  EXTREMITIES: No peripheral edema  NEUROLOGICAL: No focal deficits  SKIN: Warm and dry, no rashes  PSYCH: appropriate mood and affect    I personally reviewed all imaging, labs and notes/ documentation.     Assessment & Plan:    S/p Cardiac arrest (pVtach/ torsades)  PAF/ a flutter, currently in SR  Acute SDH  Acute metabolic encephalopathy, possible hepatic/ wernicke encephalopathy, resolved  Acute respiratory failure 2/2 cardiac arrest, resolved  Acute gastritis   Hepatic cirrhosis  Transaminitis    Alcohol use disorder  T2DM with peripheral neuropathy   AV block s/p pacemaker  HTN, HLD    Extubated on 4/15, remains on room air   Cards following, BB with hold parameters, pt currently maintaining SR so no immediate indication to start AC, may be a candidate for LAAO/Watchman device  CTH with small R posterior SDH with mild mass effect, stable on repeat imaging, neurosurg following recommend platelet goal >75, INR <1.6, keppra for seizure prophylaxis  INR 1.6 again today, vit K tablet x1 dose  GI signed off, EGD 4/15 with gastritis, no plans for colonoscopy at this time, continue PPI, lactulose, monitor hgb, s/p 1 unit pRBCs on 4/15, s/p 1 unit platelets " on 4/14  Thiamine, folate, off CIWA  PT/OT  Continue flomax, gabapentin, voltaren    DVT Prophylaxis: SCDs, OOB  Code Status: Full Code   Disposition: Cardiac ICU, pending transfer to [facility], planning for SNF vs possibly home with hhc given pt improvement on dc      Martina Becker DO  Internal Medicine/ Hospitalist

## 2024-04-20 NOTE — CARE PLAN
Problem: Safety  Goal: Patient will be injury free during hospitalization  Outcome: Progressing  Goal: I will remain free of falls  Outcome: Progressing     Problem: Daily Care  Goal: Daily care needs are met  Outcome: Progressing     Problem: Pain  Goal: My pain/discomfort is manageable  Outcome: Progressing   The patient's goals for the shift include      The clinical goals for the shift include pt will be hemodynamically stable and up to chair for 4 hours

## 2024-04-21 ENCOUNTER — HOME HEALTH ADMISSION (OUTPATIENT)
Dept: HOME HEALTH SERVICES | Facility: HOME HEALTH | Age: 64
End: 2024-04-21
Payer: COMMERCIAL

## 2024-04-21 ENCOUNTER — APPOINTMENT (OUTPATIENT)
Dept: CARDIOLOGY | Facility: HOSPITAL | Age: 64
DRG: 308 | End: 2024-04-21
Payer: COMMERCIAL

## 2024-04-21 LAB
AMMONIA PLAS-SCNC: 60 UMOL/L (ref 16–53)
ANION GAP SERPL CALC-SCNC: 11 MMOL/L (ref 10–20)
BUN SERPL-MCNC: 10 MG/DL (ref 6–23)
CALCIUM SERPL-MCNC: 8.5 MG/DL (ref 8.6–10.3)
CARDIAC TROPONIN I PNL SERPL HS: 12 NG/L (ref 0–20)
CHLORIDE SERPL-SCNC: 110 MMOL/L (ref 98–107)
CO2 SERPL-SCNC: 22 MMOL/L (ref 21–32)
CREAT SERPL-MCNC: 0.84 MG/DL (ref 0.5–1.3)
EGFRCR SERPLBLD CKD-EPI 2021: >90 ML/MIN/1.73M*2
ERYTHROCYTE [DISTWIDTH] IN BLOOD BY AUTOMATED COUNT: 18.2 % (ref 11.5–14.5)
GLUCOSE SERPL-MCNC: 106 MG/DL (ref 74–99)
HCT VFR BLD AUTO: 23.1 % (ref 41–52)
HGB BLD-MCNC: 7.5 G/DL (ref 13.5–17.5)
INR PPP: 1.7 (ref 0.9–1.1)
MAGNESIUM SERPL-MCNC: 1.87 MG/DL (ref 1.6–2.4)
MCH RBC QN AUTO: 33 PG (ref 26–34)
MCHC RBC AUTO-ENTMCNC: 32.5 G/DL (ref 32–36)
MCV RBC AUTO: 102 FL (ref 80–100)
NRBC BLD-RTO: 0 /100 WBCS (ref 0–0)
PLATELET # BLD AUTO: 89 X10*3/UL (ref 150–450)
POTASSIUM SERPL-SCNC: 3.6 MMOL/L (ref 3.5–5.3)
PROTHROMBIN TIME: 19 SECONDS (ref 9.8–12.8)
RBC # BLD AUTO: 2.27 X10*6/UL (ref 4.5–5.9)
SODIUM SERPL-SCNC: 139 MMOL/L (ref 136–145)
WBC # BLD AUTO: 5.4 X10*3/UL (ref 4.4–11.3)

## 2024-04-21 PROCEDURE — 36415 COLL VENOUS BLD VENIPUNCTURE: CPT | Performed by: INTERNAL MEDICINE

## 2024-04-21 PROCEDURE — 2500000005 HC RX 250 GENERAL PHARMACY W/O HCPCS: Performed by: INTERNAL MEDICINE

## 2024-04-21 PROCEDURE — 2500000001 HC RX 250 WO HCPCS SELF ADMINISTERED DRUGS (ALT 637 FOR MEDICARE OP): Performed by: INTERNAL MEDICINE

## 2024-04-21 PROCEDURE — 85610 PROTHROMBIN TIME: CPT | Performed by: INTERNAL MEDICINE

## 2024-04-21 PROCEDURE — 2500000006 HC RX 250 W HCPCS SELF ADMINISTERED DRUGS (ALT 637 FOR ALL PAYERS): Performed by: INTERNAL MEDICINE

## 2024-04-21 PROCEDURE — 97116 GAIT TRAINING THERAPY: CPT | Mod: GP

## 2024-04-21 PROCEDURE — 97535 SELF CARE MNGMENT TRAINING: CPT | Mod: GO

## 2024-04-21 PROCEDURE — 99232 SBSQ HOSP IP/OBS MODERATE 35: CPT | Performed by: INTERNAL MEDICINE

## 2024-04-21 PROCEDURE — 82140 ASSAY OF AMMONIA: CPT | Performed by: INTERNAL MEDICINE

## 2024-04-21 PROCEDURE — 2500000004 HC RX 250 GENERAL PHARMACY W/ HCPCS (ALT 636 FOR OP/ED): Performed by: INTERNAL MEDICINE

## 2024-04-21 PROCEDURE — 83735 ASSAY OF MAGNESIUM: CPT | Performed by: INTERNAL MEDICINE

## 2024-04-21 PROCEDURE — 2060000001 HC INTERMEDIATE ICU ROOM DAILY

## 2024-04-21 PROCEDURE — 93005 ELECTROCARDIOGRAM TRACING: CPT

## 2024-04-21 PROCEDURE — 85027 COMPLETE CBC AUTOMATED: CPT | Performed by: INTERNAL MEDICINE

## 2024-04-21 PROCEDURE — 93010 ELECTROCARDIOGRAM REPORT: CPT | Performed by: STUDENT IN AN ORGANIZED HEALTH CARE EDUCATION/TRAINING PROGRAM

## 2024-04-21 PROCEDURE — 84484 ASSAY OF TROPONIN QUANT: CPT | Performed by: INTERNAL MEDICINE

## 2024-04-21 PROCEDURE — 80048 BASIC METABOLIC PNL TOTAL CA: CPT | Performed by: INTERNAL MEDICINE

## 2024-04-21 RX ORDER — POTASSIUM CHLORIDE 1.5 G/1.58G
20 POWDER, FOR SOLUTION ORAL ONCE
Status: COMPLETED | OUTPATIENT
Start: 2024-04-21 | End: 2024-04-21

## 2024-04-21 RX ORDER — LANOLIN ALCOHOL/MO/W.PET/CERES
400 CREAM (GRAM) TOPICAL DAILY
Status: DISCONTINUED | OUTPATIENT
Start: 2024-04-21 | End: 2024-04-22 | Stop reason: HOSPADM

## 2024-04-21 RX ORDER — NITROGLYCERIN 0.4 MG/1
0.4 TABLET SUBLINGUAL ONCE
Status: COMPLETED | OUTPATIENT
Start: 2024-04-21 | End: 2024-04-21

## 2024-04-21 RX ORDER — NITROGLYCERIN 0.4 MG/1
0.4 TABLET SUBLINGUAL EVERY 5 MIN PRN
Status: DISCONTINUED | OUTPATIENT
Start: 2024-04-21 | End: 2024-04-22 | Stop reason: HOSPADM

## 2024-04-21 RX ORDER — LACTULOSE 10 G/15ML
20 SOLUTION ORAL DAILY
Status: DISCONTINUED | OUTPATIENT
Start: 2024-04-22 | End: 2024-04-22 | Stop reason: HOSPADM

## 2024-04-21 RX ORDER — METOPROLOL TARTRATE 25 MG/1
12.5 TABLET, FILM COATED ORAL 2 TIMES DAILY
Status: DISCONTINUED | OUTPATIENT
Start: 2024-04-21 | End: 2024-04-22 | Stop reason: HOSPADM

## 2024-04-21 RX ORDER — PHYTONADIONE 5 MG/1
10 TABLET ORAL ONCE
Status: COMPLETED | OUTPATIENT
Start: 2024-04-21 | End: 2024-04-21

## 2024-04-21 RX ORDER — MAGNESIUM SULFATE HEPTAHYDRATE 40 MG/ML
2 INJECTION, SOLUTION INTRAVENOUS ONCE
Status: COMPLETED | OUTPATIENT
Start: 2024-04-21 | End: 2024-04-21

## 2024-04-21 RX ADMIN — POLYVINYL ALCOHOL, POVIDONE 1 DROP: 14; 6 SOLUTION/ DROPS OPHTHALMIC at 09:12

## 2024-04-21 RX ADMIN — METOPROLOL TARTRATE 12.5 MG: 25 TABLET, FILM COATED ORAL at 09:12

## 2024-04-21 RX ADMIN — LACTULOSE 20 G: 20 SOLUTION ORAL at 09:18

## 2024-04-21 RX ADMIN — ACETAMINOPHEN 650 MG: 325 TABLET ORAL at 01:44

## 2024-04-21 RX ADMIN — GABAPENTIN 200 MG: 100 CAPSULE ORAL at 09:12

## 2024-04-21 RX ADMIN — GABAPENTIN 200 MG: 100 CAPSULE ORAL at 20:52

## 2024-04-21 RX ADMIN — METOPROLOL TARTRATE 12.5 MG: 25 TABLET, FILM COATED ORAL at 20:52

## 2024-04-21 RX ADMIN — DICLOFENAC SODIUM 4 G: 10 GEL TOPICAL at 20:53

## 2024-04-21 RX ADMIN — POLYVINYL ALCOHOL, POVIDONE 1 DROP: 14; 6 SOLUTION/ DROPS OPHTHALMIC at 20:52

## 2024-04-21 RX ADMIN — POTASSIUM CHLORIDE 20 MEQ: 1.5 POWDER, FOR SOLUTION ORAL at 15:15

## 2024-04-21 RX ADMIN — NITROGLYCERIN 0.4 MG: 0.4 TABLET SUBLINGUAL at 00:10

## 2024-04-21 RX ADMIN — Medication 1 TABLET: at 09:12

## 2024-04-21 RX ADMIN — PHYTONADIONE 10 MG: 5 TABLET ORAL at 09:12

## 2024-04-21 RX ADMIN — MAGNESIUM OXIDE TAB 400 MG (241.3 MG ELEMENTAL MG) 400 MG: 400 (241.3 MG) TAB at 09:12

## 2024-04-21 RX ADMIN — POTASSIUM CHLORIDE 20 MEQ: 1.5 POWDER, FOR SOLUTION ORAL at 09:12

## 2024-04-21 RX ADMIN — PANTOPRAZOLE SODIUM 40 MG: 40 TABLET, DELAYED RELEASE ORAL at 06:25

## 2024-04-21 RX ADMIN — MAGNESIUM SULFATE HEPTAHYDRATE 2 G: 40 INJECTION, SOLUTION INTRAVENOUS at 15:15

## 2024-04-21 RX ADMIN — DICLOFENAC SODIUM 4 G: 10 GEL TOPICAL at 09:13

## 2024-04-21 RX ADMIN — LEVETIRACETAM 500 MG: 500 TABLET, FILM COATED ORAL at 20:52

## 2024-04-21 RX ADMIN — LEVETIRACETAM 500 MG: 500 TABLET, FILM COATED ORAL at 09:12

## 2024-04-21 RX ADMIN — TAMSULOSIN HYDROCHLORIDE 0.4 MG: 0.4 CAPSULE ORAL at 09:12

## 2024-04-21 RX ADMIN — GABAPENTIN 200 MG: 100 CAPSULE ORAL at 15:16

## 2024-04-21 RX ADMIN — FOLIC ACID 1 MG: 1 TABLET ORAL at 09:12

## 2024-04-21 RX ADMIN — ALLOPURINOL 300 MG: 300 TABLET ORAL at 09:12

## 2024-04-21 ASSESSMENT — COGNITIVE AND FUNCTIONAL STATUS - GENERAL
MOBILITY SCORE: 16
MOVING TO AND FROM BED TO CHAIR: A LITTLE
STANDING UP FROM CHAIR USING ARMS: A LITTLE
HELP NEEDED FOR BATHING: A LOT
PERSONAL GROOMING: A LITTLE
DRESSING REGULAR LOWER BODY CLOTHING: A LOT
DRESSING REGULAR UPPER BODY CLOTHING: A LITTLE
DAILY ACTIVITIY SCORE: 16
MOVING FROM LYING ON BACK TO SITTING ON SIDE OF FLAT BED WITH BEDRAILS: A LITTLE
MOBILITY SCORE: 16
WALKING IN HOSPITAL ROOM: A LOT
WALKING IN HOSPITAL ROOM: A LOT
MOVING TO AND FROM BED TO CHAIR: A LITTLE
TOILETING: A LOT
CLIMB 3 TO 5 STEPS WITH RAILING: A LOT
CLIMB 3 TO 5 STEPS WITH RAILING: A LOT
MOVING FROM LYING ON BACK TO SITTING ON SIDE OF FLAT BED WITH BEDRAILS: A LITTLE
STANDING UP FROM CHAIR USING ARMS: A LITTLE
TURNING FROM BACK TO SIDE WHILE IN FLAT BAD: A LITTLE
TURNING FROM BACK TO SIDE WHILE IN FLAT BAD: A LITTLE

## 2024-04-21 ASSESSMENT — PAIN SCALES - GENERAL
PAINLEVEL_OUTOF10: 4
PAINLEVEL_OUTOF10: 0 - NO PAIN
PAINLEVEL_OUTOF10: 3
PAINLEVEL_OUTOF10: 3
PAINLEVEL_OUTOF10: 0 - NO PAIN

## 2024-04-21 ASSESSMENT — PAIN - FUNCTIONAL ASSESSMENT
PAIN_FUNCTIONAL_ASSESSMENT: 0-10

## 2024-04-21 ASSESSMENT — ACTIVITIES OF DAILY LIVING (ADL): HOME_MANAGEMENT_TIME_ENTRY: 31

## 2024-04-21 NOTE — PROGRESS NOTES
"Hospital Medicine Progress Note    Subjective:  Trey Borjas is a 64 y.o. male, who is hospital day 9.     Patient had midsternal CP overnight, describes it as feeling like someone punched him in the chest, no radiation of pain, no assoc sob or additional sympts. Improved with nitro, no ECG changes & negative trop. Currently pain free. Feels well today, getting stronger. Ambulatory in his room.     Objective:    BP 90/51 (BP Location: Left arm, Patient Position: Lying)   Pulse 78   Temp 36.9 °C (98.4 °F) (Temporal)   Resp 18   Ht 1.829 m (6' 0.01\")   Wt 94 kg (207 lb 3.7 oz)   SpO2 92%   BMI 28.10 kg/m²     Physical Exam:  GENERAL: A&Ox3, no distress, cooperative  HEENT: NC/AT, EOMI, clear sclera, MMM  NECK: Supple  CARDIOVASCULAR: RRR, no murmurs. S1/S2  RESPIRATORY: CTAB, no RRW or crackles. No distress  ABDOMEN: Soft, ND, non-tender. No rebound or guarding. BS+  EXTREMITIES: No peripheral edema  NEUROLOGICAL: No focal deficits  SKIN: Warm and dry, no rashes  PSYCH: appropriate mood and affect    I personally reviewed all imaging, labs and notes/ documentation.     Assessment & Plan:    S/p Cardiac arrest (pVtach/ torsades)  PAF/ a flutter, currently in SR  Acute SDH, stable  Acute metabolic encephalopathy, possible hepatic/ wernicke encephalopathy, resolved  Acute respiratory failure 2/2 cardiac arrest, resolved  Acute gastritis   Hepatic cirrhosis  Transaminitis    Alcohol use disorder  T2DM with peripheral neuropathy   AV block s/p pacemaker  HTN, HLD    Extubated on 4/15, remains on room air   Cards following, BB with hold parameters, pt currently maintaining SR so no immediate indication to start AC, may be a candidate for LAAO/Watchman device  CTH with small R posterior SDH with mild mass effect, stable on repeat imaging, neurosurg following recommend platelet goal >75, INR <1.6, keppra for seizure prophylaxis, f/ in 2 weeks with repeat head CT  INR 1.7 today, vit K tablet x1 dose  GI signed off, " EGD 4/15 with gastritis, no plans for colonoscopy at this time, continue PPI, lactulose, monitor hgb, s/p 1 unit pRBCs on 4/15, s/p 1 unit platelets on 4/14  Thiamine, folate  Continue flomax, gabapentin, voltaren    DVT Prophylaxis: SCDs, OOB  Code Status: Full Code   Disposition: Cardiac ICU, pending transfer to [facility], planning for dc home with [facility] likely tomorrow once set up    Martina Becker DO  Internal Medicine/ Hospitalist

## 2024-04-21 NOTE — PROGRESS NOTES
Received update from attending on plan for discharge tomorrow with home health care. Met with patient at bedside to follow up on discharge plan. Patient confirms his plan is to return home at discharge. Patients brother lives at home with him. Spoke with patient regarding home health care, patient agreeable to home health care. Patient preference is [facility]. Address and contact information verified for home care services.

## 2024-04-21 NOTE — PROGRESS NOTES
Occupational Therapy    Occupational Therapy Treatment    Name: Trey Borjas  MRN: [id number]  : 1960  Date: 24  Time Calculation  Start Time: 1409  Stop Time: 1455  Time Calculation (min): 46 min    Assessment:  End of Session Communication: Bedside nurse  End of Session Patient Position: Bed, 2 rail up, Alarm on  Plan:  Treatment Interventions: ADL retraining, Functional transfer training  OT Frequency: 3 times per week  OT Discharge Recommendations: Moderate intensity level of continued care  OT - OK to Discharge: Yes (to next level of care when cleared by medical team)    Subjective   Previous Visit Info:  OT Last Visit  OT Received On: 24  General:  General  Reason for Referral: OT eval/tx; impaired  functional living skills  Prior to Session Communication: Bedside nurse  Patient Position Received: Up in chair, Alarm on  General Comment: Pt up in chair, agreeable to session. Pt reports feeling stronger today than he has all week (Pt states he's hoping to d/c home and not SNF d/t seeing his mother in a SNF before. Pt states he was only up/moving /c therapy and would sit/lay the rest of the day.)  Precautions:cardiac, fall     Vitals: variable, RN states vitals are not correct on monitor - per rn okay to see pt     Pain Assessment:  Pain Assessment  Pain Assessment: 0-10  Pain Score: 0 - No pain     Objective   Activities of Daily Living:         Pt completed LE adl with mod a for donning socks using sock aid, sba to don pants over legs and min a to don /doff pants over hips.Pt completed donning simulated pullover shirt with supervision. Pt required sba/min a UE bathing and mod a for bathing LE and sba sink level to complete grooming tasks . Pt's independence/ability to complete tasks in impacted by decreased endurance and maximal verbal cues for  wheeled walker safety with maneuvering wheeled walker safely to/from commode and adl equipment use. Pt appeared confused at times during adl  equipment training.  Pt was educated in cardiac precautions/work simplification/energy conservation principles /diaphragmatic breathing as applied to adl/functional mobility, demonstrating good comprehension , but only fair application during Self care OT tx intervention.               Functional Standing Tolerance:     Bed Mobility/Transfers:      Transfers  Transfer:  (Sit-stand from chair and toilet CGA, UE support, min VCs. Pt up to toilet for BM, supervision for hygiene tasks.)       I   Cognitive Skill Development:   wfl  Vision:   wfl  Strength:  Strength  Strength Comments: strength below elbows wfl  Other Activity:   Education in  cardiac precautions as applied to adl/ functional mobility    RUE   RUE : Within Functional Limits and LUE   LUE: Within Functional Limits      Outcome Measures:  [facility] Daily Activity  Putting on and taking off regular lower body clothing: A lot (endurance, including tasks to gather clothes)  Bathing (including washing, rinsing, drying): A lot  Putting on and taking off regular upper body clothing: A little  Toileting, which includes using toilet, bedpan or urinal: A lot (endurance)  Taking care of personal grooming such as brushing teeth: A little  Eating Meals: None  Daily Activity - Total Score: 16        Education Documentation  Mobility Training, taught by Suzette Carranza OT at 4/21/2024  6:22 PM.  Learner: Patient  Readiness: Acceptance  Method: Demonstration  Response: Demonstrated Understanding, Needs Reinforcement    Body Mechanics, taught by Suzette Carranza OT at 4/21/2024  6:22 PM.  Learner: Patient  Readiness: Acceptance  Method: Demonstration  Response: Demonstrated Understanding, Needs Reinforcement    ADL Training, taught by Suzette Carranza OT at 4/21/2024  6:22 PM.  Learner: Patient  Readiness: Acceptance  Method: Demonstration  Response: Demonstrated Understanding, Needs Reinforcement    Education Comments  No comments found.      Goals:  Encounter  Problems       Encounter Problems (Active)       OT Goals       increase bue ther ex/activity x 7-10 minutes and increase standing tolerance x 3-5 minutes with cga to promote greater activity tolerance for assist with adl.   (Progressing)       Start:  04/16/24    Expected End:  04/25/24            Increase functional mobility and  functional transfers to cga for bed/chair/toilet with dme prn   (Progressing)       Start:  04/16/24    Expected End:  04/25/24            Increase grooming/ub bathing to cga with dme prn  (Progressing)       Start:  04/16/24    Expected End:  04/25/24              Pt will maintain attention >8 min with min cueing for active participation in functional tasks in preparation for ADLs and continue to be cam (-) during hospital stay (Progressing)       Start:  04/16/24    Expected End:  04/25/24            Increase toileting to cga with dme prn  (Progressing)       Start:  04/16/24    Expected End:  04/25/24

## 2024-04-21 NOTE — PROGRESS NOTES
Physical Therapy    Physical Therapy Treatment    Patient Name: Trey Borjas  MRN: [id number]  Today's Date: 4/21/2024  Time Calculation  Start Time: 1408  Stop Time: 1500  Time Calculation (min): 52 min       Assessment/Plan   PT Assessment  PT Assessment Results: Decreased mobility  Rehab Prognosis: Good  Evaluation/Treatment Tolerance: Patient limited by fatigue  Medical Staff Made Aware: Yes  End of Session Communication: Bedside nurse  Assessment Comment:  (Pt continues to be motivated/demonstrates inc tolerance for standing/ambulation this date. Pt still needing at least min A for transfers and gait and is further limited d/t deconditioning and LE weakness. Continue to recommend mod intensity PT upon d/c.)  End of Session Patient Position: Up in chair, Alarm on     PT Plan  Treatment/Interventions: Bed mobility, Transfer training, Gait training, Stair training, Strengthening, Endurance training, Therapeutic exercise, Therapeutic activity, Home exercise program, Balance training  PT Plan: Skilled PT  PT Frequency: 3 times per week  PT Discharge Recommendations: Moderate intensity level of continued care  PT - OK to Discharge: Yes (once cleared by medical team)    Current Problem:  Patient Active Problem List   Diagnosis    Male erectile disorder    Abnormal LFTs    Alcohol abuse    Benign essential HTN    Bilateral plantar fasciitis    Degeneration of intervertebral disc of lumbar region    Elevated PSA    Esophagitis    Gout    Hepatic steatosis    Neuropathy, peripheral    Pacemaker    Prostate cancer (Multi)    Spinal stenosis of lumbar region    Chronic obstructive pulmonary disease with (acute) lower respiratory infection (Multi)    Hypothyroidism    Rheumatoid arthritis (Multi)    Atrial flutter, unspecified type (Multi)       General Visit Information:   PT  Visit  PT Received On: 04/21/24  Response to Previous Treatment: Patient with no complaints from previous session.  General  Prior to Session  Communication: Bedside nurse  Patient Position Received: Up in chair, Alarm on  General Comment: Pt up in chair, agreeable to session. Pt reports feeling stronger today than he has all week (Pt states  he's hoping to d/c home and not SNF d/t seeing his mother in a SNF before. Pt states she was only up/moving /c therapy and would sit/lay the rest of the day.)  Co treat /c OT for possible two person assist, maximize pt's functional mobility.    Subjective     Precautions:  Precautions  Precautions Comment: Falls       Objective     Pain:  Pain Assessment  Pain Assessment: 0-10  Pain Score: 0 - No pain    Cognition:  Cognition  Overall Cognitive Status: Within Functional Limits       Activity Tolerance:  Activity Tolerance  Endurance: Tolerates less than 10 min exercise, no significant change in vital signs    Treatments:     Therapeutic Activity  Therapeutic Activity Performed:  (Review of energy conservation techniques, activity pacing, safety and AD use. Pt states understanding of above.)        Ambulation/Gait Training  Ambulation/Gait Training Performed:  (Pt ambulates 10'x2, WW, minAx1. Mild tremors, step through gait, very slow rere. Pt denies HA/dizziness. Mild SOB noted.)  Transfers  Transfer:  (Sit-stand from chair and toilet CGA, UE support, min VCs. Pt up to toilet for BM, supervision for hygiene tasks.)          Outcome Measures:  [facility] Basic Mobility  Turning from your back to your side while in a flat bed without using bedrails: A little  Moving from lying on your back to sitting on the side of a flat bed without using bedrails: A little  Moving to and from bed to chair (including a wheelchair): A little  Standing up from a chair using your arms (e.g. wheelchair or bedside chair): A little  To walk in hospital room: A lot  Climbing 3-5 steps with railing: A lot  Basic Mobility - Total Score: 16  Education Documentation  Mobility Training, taught by Vanesa Arteaga, PT at 4/21/2024  3:48 PM.  Learner:  Patient  Readiness: Acceptance  Method: Explanation  Response: Verbalizes Understanding, Needs Reinforcement    Education Comments  No comments found.        EDUCATION:     Encounter Problems       Encounter Problems (Active)       PT Problem       STG - Pt will amb 20' using LRD with CGA (Progressing)       Start:  04/16/24    Expected End:  04/30/24            STG - Pt will transition supine <> sitting with min assist x2  (Progressing)       Start:  04/16/24    Expected End:  04/30/24            STG - Pt will transfer STS with CGA  (Progressing)       Start:  04/16/24    Expected End:  04/30/24            STG - Pt will perform a B LE ther ex program of 2-3 sets of 10  (Progressing)       Start:  04/16/24    Expected End:  04/30/24

## 2024-04-22 ENCOUNTER — DOCUMENTATION (OUTPATIENT)
Dept: HOME HEALTH SERVICES | Facility: HOME HEALTH | Age: 64
End: 2024-04-22
Payer: COMMERCIAL

## 2024-04-22 VITALS
HEART RATE: 82 BPM | WEIGHT: 212.96 LBS | SYSTOLIC BLOOD PRESSURE: 121 MMHG | DIASTOLIC BLOOD PRESSURE: 62 MMHG | BODY MASS INDEX: 28.85 KG/M2 | TEMPERATURE: 97.5 F | OXYGEN SATURATION: 97 % | HEIGHT: 72 IN | RESPIRATION RATE: 24 BRPM

## 2024-04-22 LAB
ANION GAP SERPL CALC-SCNC: 9 MMOL/L (ref 10–20)
BUN SERPL-MCNC: 11 MG/DL (ref 6–23)
CALCIUM SERPL-MCNC: 8.2 MG/DL (ref 8.6–10.3)
CHLORIDE SERPL-SCNC: 109 MMOL/L (ref 98–107)
CO2 SERPL-SCNC: 23 MMOL/L (ref 21–32)
CREAT SERPL-MCNC: 0.83 MG/DL (ref 0.5–1.3)
EGFRCR SERPLBLD CKD-EPI 2021: >90 ML/MIN/1.73M*2
ERYTHROCYTE [DISTWIDTH] IN BLOOD BY AUTOMATED COUNT: 18.5 % (ref 11.5–14.5)
GLUCOSE SERPL-MCNC: 120 MG/DL (ref 74–99)
HCT VFR BLD AUTO: 25.6 % (ref 41–52)
HGB BLD-MCNC: 8.3 G/DL (ref 13.5–17.5)
MAGNESIUM SERPL-MCNC: 2.01 MG/DL (ref 1.6–2.4)
MCH RBC QN AUTO: 33.2 PG (ref 26–34)
MCHC RBC AUTO-ENTMCNC: 32.4 G/DL (ref 32–36)
MCV RBC AUTO: 102 FL (ref 80–100)
NRBC BLD-RTO: 0 /100 WBCS (ref 0–0)
PLATELET # BLD AUTO: 91 X10*3/UL (ref 150–450)
POTASSIUM SERPL-SCNC: 4 MMOL/L (ref 3.5–5.3)
RBC # BLD AUTO: 2.5 X10*6/UL (ref 4.5–5.9)
SODIUM SERPL-SCNC: 137 MMOL/L (ref 136–145)
WBC # BLD AUTO: 6.4 X10*3/UL (ref 4.4–11.3)

## 2024-04-22 PROCEDURE — 2500000001 HC RX 250 WO HCPCS SELF ADMINISTERED DRUGS (ALT 637 FOR MEDICARE OP): Performed by: INTERNAL MEDICINE

## 2024-04-22 PROCEDURE — 97116 GAIT TRAINING THERAPY: CPT | Mod: GP

## 2024-04-22 PROCEDURE — 2500000005 HC RX 250 GENERAL PHARMACY W/O HCPCS: Performed by: INTERNAL MEDICINE

## 2024-04-22 PROCEDURE — 36415 COLL VENOUS BLD VENIPUNCTURE: CPT | Performed by: INTERNAL MEDICINE

## 2024-04-22 PROCEDURE — 80048 BASIC METABOLIC PNL TOTAL CA: CPT | Performed by: INTERNAL MEDICINE

## 2024-04-22 PROCEDURE — 2500000004 HC RX 250 GENERAL PHARMACY W/ HCPCS (ALT 636 FOR OP/ED): Performed by: INTERNAL MEDICINE

## 2024-04-22 PROCEDURE — 2500000006 HC RX 250 W HCPCS SELF ADMINISTERED DRUGS (ALT 637 FOR ALL PAYERS): Performed by: INTERNAL MEDICINE

## 2024-04-22 PROCEDURE — 85027 COMPLETE CBC AUTOMATED: CPT | Performed by: INTERNAL MEDICINE

## 2024-04-22 PROCEDURE — 83735 ASSAY OF MAGNESIUM: CPT | Performed by: INTERNAL MEDICINE

## 2024-04-22 PROCEDURE — 97535 SELF CARE MNGMENT TRAINING: CPT | Mod: GO

## 2024-04-22 PROCEDURE — 99231 SBSQ HOSP IP/OBS SF/LOW 25: CPT | Performed by: INTERNAL MEDICINE

## 2024-04-22 RX ORDER — LANOLIN ALCOHOL/MO/W.PET/CERES
400 CREAM (GRAM) TOPICAL DAILY
Qty: 30 TABLET | Refills: 0 | Status: SHIPPED | OUTPATIENT
Start: 2024-04-23 | End: 2024-05-20 | Stop reason: SDUPTHER

## 2024-04-22 RX ORDER — LACTULOSE 10 G/15ML
20 SOLUTION ORAL DAILY
Qty: 900 ML | Refills: 0 | Status: SHIPPED | OUTPATIENT
Start: 2024-04-23 | End: 2024-05-23

## 2024-04-22 RX ORDER — DICLOFENAC SODIUM 10 MG/G
4 GEL TOPICAL 2 TIMES DAILY
Qty: 112 G | Refills: 0 | Status: SHIPPED | OUTPATIENT
Start: 2024-04-22 | End: 2024-05-06

## 2024-04-22 RX ORDER — LEVETIRACETAM 500 MG/1
500 TABLET ORAL 2 TIMES DAILY
Qty: 60 TABLET | Refills: 0 | Status: SHIPPED | OUTPATIENT
Start: 2024-04-22 | End: 2024-05-20 | Stop reason: SDUPTHER

## 2024-04-22 RX ORDER — GABAPENTIN 100 MG/1
200 CAPSULE ORAL 3 TIMES DAILY
Qty: 180 CAPSULE | Refills: 0 | Status: SHIPPED | OUTPATIENT
Start: 2024-04-22 | End: 2024-05-20 | Stop reason: SDUPTHER

## 2024-04-22 RX ORDER — METOPROLOL TARTRATE 25 MG/1
12.5 TABLET, FILM COATED ORAL 2 TIMES DAILY
Qty: 30 TABLET | Refills: 0 | Status: SHIPPED | OUTPATIENT
Start: 2024-04-22 | End: 2024-05-20 | Stop reason: SDUPTHER

## 2024-04-22 RX ADMIN — PANTOPRAZOLE SODIUM 40 MG: 40 TABLET, DELAYED RELEASE ORAL at 08:19

## 2024-04-22 RX ADMIN — TAMSULOSIN HYDROCHLORIDE 0.4 MG: 0.4 CAPSULE ORAL at 08:20

## 2024-04-22 RX ADMIN — METOPROLOL TARTRATE 12.5 MG: 25 TABLET, FILM COATED ORAL at 08:20

## 2024-04-22 RX ADMIN — MAGNESIUM OXIDE TAB 400 MG (241.3 MG ELEMENTAL MG) 400 MG: 400 (241.3 MG) TAB at 08:20

## 2024-04-22 RX ADMIN — ALLOPURINOL 300 MG: 300 TABLET ORAL at 08:20

## 2024-04-22 RX ADMIN — Medication 1 TABLET: at 08:20

## 2024-04-22 RX ADMIN — LACTULOSE 20 G: 20 SOLUTION ORAL at 08:19

## 2024-04-22 RX ADMIN — FOLIC ACID 1 MG: 1 TABLET ORAL at 08:20

## 2024-04-22 RX ADMIN — DICLOFENAC SODIUM 4 G: 10 GEL TOPICAL at 08:21

## 2024-04-22 RX ADMIN — LEVETIRACETAM 500 MG: 500 TABLET, FILM COATED ORAL at 08:20

## 2024-04-22 RX ADMIN — POLYVINYL ALCOHOL, POVIDONE 1 DROP: 14; 6 SOLUTION/ DROPS OPHTHALMIC at 08:20

## 2024-04-22 RX ADMIN — GABAPENTIN 200 MG: 100 CAPSULE ORAL at 08:19

## 2024-04-22 ASSESSMENT — COGNITIVE AND FUNCTIONAL STATUS - GENERAL
CLIMB 3 TO 5 STEPS WITH RAILING: A LITTLE
TURNING FROM BACK TO SIDE WHILE IN FLAT BAD: A LITTLE
HELP NEEDED FOR BATHING: A LITTLE
WALKING IN HOSPITAL ROOM: A LITTLE
MOVING FROM LYING ON BACK TO SITTING ON SIDE OF FLAT BED WITH BEDRAILS: A LITTLE
DRESSING REGULAR LOWER BODY CLOTHING: A LITTLE
WALKING IN HOSPITAL ROOM: A LITTLE
CLIMB 3 TO 5 STEPS WITH RAILING: A LITTLE
MOBILITY SCORE: 22
STANDING UP FROM CHAIR USING ARMS: A LITTLE
HELP NEEDED FOR BATHING: A LITTLE
DRESSING REGULAR LOWER BODY CLOTHING: A LITTLE
MOBILITY SCORE: 18
TOILETING: A LITTLE
TOILETING: A LITTLE
DAILY ACTIVITIY SCORE: 21
DAILY ACTIVITIY SCORE: 21
MOVING TO AND FROM BED TO CHAIR: A LITTLE

## 2024-04-22 ASSESSMENT — ACTIVITIES OF DAILY LIVING (ADL): HOME_MANAGEMENT_TIME_ENTRY: 15

## 2024-04-22 ASSESSMENT — PAIN SCALES - GENERAL
PAINLEVEL_OUTOF10: 0 - NO PAIN

## 2024-04-22 ASSESSMENT — PAIN - FUNCTIONAL ASSESSMENT
PAIN_FUNCTIONAL_ASSESSMENT: 0-10

## 2024-04-22 NOTE — PROGRESS NOTES
Physical Therapy  Physical Therapy Treatment    Patient Name: Trey Borjas  MRN: [id number]  Room: [address] [place] [address]    Today's Date: 4/22/2024  Time Calculation  Start Time: 1124  Stop Time: 1152  Time Calculation (min): 28 min       Documented by ROSS Gregg.   SPT under direct supervision of Evita Turner, PT, DPT during session.  Assessment/Plan   PT Assessment  PT Assessment Results: Decreased mobility  Rehab Prognosis: Good  Evaluation/Treatment Tolerance: Patient limited by fatigue  Medical Staff Made Aware: Yes  End of Session Communication: Bedside nurse  Assessment Comment: Pt tolerated treatment well with. Improvements made in pt's coordination, with intact FTN and finger taps bilaterally. Pt demonstrates safety with ambulation and stair negotiation, appears to be safe to return home with supervision/help from brother for x1 week. Pt educated on energy conservation strategies for navigating steps to enter condo and task delegation.  End of Session Patient Position: Up in chair, Alarm on       PT Plan  Treatment/Interventions: Bed mobility, Transfer training, Gait training, Stair training, Strengthening, Endurance training, Therapeutic exercise, Therapeutic activity, Home exercise program, Balance training  PT Plan: Skilled PT  PT Frequency: 4 times per week  PT Discharge Recommendations: Moderate intensity level of continued care  PT - OK to Discharge: Yes (once cleared by medical team)    Current Problem:  Patient Active Problem List   Diagnosis    Male erectile disorder    Abnormal LFTs    Alcohol abuse    Benign essential HTN    Bilateral plantar fasciitis    Degeneration of intervertebral disc of lumbar region    Elevated PSA    Esophagitis    Gout    Hepatic steatosis    Neuropathy, peripheral    Pacemaker    Prostate cancer (Multi)    Spinal stenosis of lumbar region    Chronic obstructive pulmonary disease with (acute) lower respiratory infection (Multi)    Hypothyroidism    Rheumatoid arthritis  (Multi)    Atrial flutter, unspecified type (Multi)     General Visit Information:   PT  Visit  PT Received On: 04/22/24  General  Reason for Referral: PT eval and treat; aflutter (HR 120s), mild metabolic acidosis, EtOH cirrhosis. Pt in EtOH withdrawal and had code blue on 4/12/24 (unresponsive, intubated, v tach). Head CT positive for SDH posterior R cerebral hemisphere, small hemorrhage R lateral aspect posterior falx. Repeat head CT stable on 4/13 and 4/14. Blood via NG tube, so Hgb transfused with goal Hgb >7. Neurosurgery consulted and rec'd no surgery and maintain SBP <140. GI consulted and EGD performed on 4/15; no active bleeding, hold off on colonoscopy.  Referred By: Elis  Past Medical History Relevant to Rehab: HTN, gout, EtOH abuse, smoking, complete heart block s/p PM, neuropathy B feet  Co-Treatment: OT  Prior to Session Communication: Bedside nurse  Patient Position Received: Up in bathroom  General Comment: Pt agreeable to therapy intervention today.  Subjective   Precautions:  Precautions  Precautions Comment: RA, R elbow wound, aspiration precautions    Vital Signs:  Vital Signs  Heart Rate:  (Vital signs stable throughout treatment.)  SpO2: 95 %  BP:  (100/57 at start, 92/55 sitting EOB, 88/55 sitting upright in the chair, 98/56 reclined position in chair.)  Objective   Pain:  Pain Assessment  Pain Assessment: 0-10  Pain Score: 0 - No pain    Cognition:  Cognition  Overall Cognitive Status: Within Functional Limits    Activity Tolerance:  Activity Tolerance  Early Mobility/Exercise Safety Screen: Proceed with mobilization - No exclusion criteria met    Treatments:  Ambulation/Gait Training  Ambulation/Gait Training Performed:  (Pt ambulates 300' using WW and SBA. Pt able to ambulate short distance (< 5') using no AD and SBA. Pt continues to demonstrate wide ANUSHA, however has made improvements in balance/stability during ambulation.)  Transfers  Transfer:  (SUP for STS from  toilet)  Stairs  Stairs:  (Pt negotiates 4 steps x2 trials using B railings and SBA. Pt utilizes step-to pattern for safely ascending/descending steps.)       Outcome Measures:  [facility] Basic Mobility  Turning from your back to your side while in a flat bed without using bedrails: A little  Moving from lying on your back to sitting on the side of a flat bed without using bedrails: A little  Moving to and from bed to chair (including a wheelchair): A little  Standing up from a chair using your arms (e.g. wheelchair or bedside chair): A little  To walk in hospital room: A little  Climbing 3-5 steps with railing: A little  Basic Mobility - Total Score: 18    FSS-ICU  Ambulation: Walks >/ or equal to 150 feet with supervision  Rolling: Supervision or set-up only  Sitting: Supervision or set-up only  Transfer Sit-to-Stand: Supervision or set-up only  Transfer Supine-to-Sit: Supervision or set-up only  Total Score: 25     E = Exercise and Early Mobility  Early Mobility/Exercise Safety Screen: Proceed with mobilization - No exclusion criteria met  Current Activity: Ambulating in cobb     Education:  Education Documentation  Mobility Training, taught by DANDY Gregg at 4/22/2024  1:10 PM.  Learner: Patient  Readiness: Acceptance  Method: Explanation  Response: Verbalizes Understanding    Education Comments  No comments found.      Goals:     Encounter Problems       Encounter Problems (Active)       PT Problem       STG - Pt will amb 20' using LRD with CGA (Met)       Start:  04/16/24    Expected End:  04/30/24    Resolved:  04/22/24         STG - Pt will transition supine <> sitting with min assist x2  (Progressing)       Start:  04/16/24    Expected End:  04/30/24            STG - Pt will transfer STS with CGA  (Met)       Start:  04/16/24    Expected End:  04/30/24    Resolved:  04/22/24         STG - Pt will perform a B LE ther ex program of 2-3 sets of 10  (Progressing)       Start:  04/16/24    Expected End:   04/30/24

## 2024-04-22 NOTE — DISCHARGE INSTRUCTIONS
-Please do not drink any more alcohol, you already have evidence of changes to your liver from alcohol use and this will only get worse if you continue.  -Take lactulose daily or as needed to make sure you have a bowel movement daily or at least every other day like we discussed  -Daily magnesium supplement  -Metoprolol tartrate 12.5 mg two times a day  -Keppra 500 mg two times a day, this is to prevent seizures

## 2024-04-22 NOTE — CARE PLAN
The clinical goals for the shift include pt will remain hemodynamically stable throughout this shift.    Problem: Pain  Goal: My pain/discomfort is manageable  Outcome: Met     Problem: Safety  Goal: Patient will be injury free during hospitalization  Outcome: Met  Goal: I will remain free of falls  Outcome: Met     Problem: Daily Care  Goal: Daily care needs are met  Outcome: Met     Problem: Psychosocial Needs  Goal: Demonstrates ability to cope with hospitalization/illness  Outcome: Met  Goal: Collaborate with me, my family, and caregiver to identify my specific goals  Outcome: Met     Problem: Discharge Barriers  Goal: My discharge needs are met  Outcome: Met     Problem: Arrythmia/Dysrhythmia  Goal: Lab values return to normal range  Outcome: Met  Goal: No evidence of post procedure complications  Outcome: Met  Goal: Promote self management  Outcome: Met  Goal: Serial ECG will return to baseline  Outcome: Met  Goal: Verbalize understanding of procedures/devices  Outcome: Met  Goal: Vital signs return to baseline  Outcome: Met  Goal: Care and maintenance of device (specify)  Outcome: Met     Problem: Respiratory  Goal: Clear secretions with interventions this shift  Outcome: Met  Goal: Minimize anxiety/maximize coping throughout shift  Outcome: Met  Goal: Minimal/no exertional discomfort or dyspnea this shift  Outcome: Met  Goal: No signs of respiratory distress (eg. Use of accessory muscles. Peds grunting)  Outcome: Met  Goal: Patent airway maintained this shift  Outcome: Met  Goal: Tolerate pulmonary toileting this shift  Outcome: Met  Goal: Verbalize decreased shortness of breath this shift  Outcome: Met  Goal: Wean oxygen to maintain O2 saturation per order/standard this shift  Outcome: Met  Goal: Increase self care and/or family involvement in next 24 hours  Outcome: Met     Problem: Pain - Adult  Goal: Verbalizes/displays adequate comfort level or baseline comfort level  Outcome: Met     Problem: Safety -  Adult  Goal: Free from fall injury  Outcome: Met     Problem: Discharge Planning  Goal: Discharge to home or other facility with appropriate resources  Outcome: Met     Problem: Chronic Conditions and Co-morbidities  Goal: Patient's chronic conditions and co-morbidity symptoms are monitored and maintained or improved  Outcome: Met     Problem: Skin  Goal: Decreased wound size/increased tissue granulation at next dressing change  Outcome: Met  Goal: Participates in plan/prevention/treatment measures  Outcome: Met  Goal: Prevent/manage excess moisture  Outcome: Met  Goal: Prevent/minimize sheer/friction injuries  Outcome: Met  Goal: Promote/optimize nutrition  Outcome: Met  Goal: Promote skin healing  Outcome: Met     Problem: Fall/Injury  Goal: Not fall by end of shift  Outcome: Met  Goal: Be free from injury by end of the shift  Outcome: Met  Goal: Verbalize understanding of personal risk factors for fall in the hospital  Outcome: Met  Goal: Verbalize understanding of risk factor reduction measures to prevent injury from fall in the home  Outcome: Met  Goal: Use assistive devices by end of the shift  Outcome: Met  Goal: Pace activities to prevent fatigue by end of the shift  Outcome: Met

## 2024-04-22 NOTE — PROGRESS NOTES
Occupational Therapy    OT Treatment    Patient Name: Trey Borjas  MRN: [id number]  Today's Date: 4/22/2024  Time Calculation  Start Time: 1123  Stop Time: 1152  Time Calculation (min): 29 min         Assessment:  End of Session Communication: Bedside nurse  End of Session Patient Position: Up in chair, Alarm on     Plan:  Treatment Interventions: ADL retraining, Functional transfer training  OT Frequency: 3 times per week  OT Discharge Recommendations: Moderate intensity level of continued care  OT - OK to Discharge: Yes (to next level of care when cleared by medical team)  Treatment Interventions: ADL retraining, Functional transfer training    Subjective   Previous Visit Info:  OT Last Visit  OT Received On: 04/22/24  General:  General  Prior to Session Communication: Bedside nurse  Patient Position Received: Up in bathroom  General Comment: pt. agreeable to therapy intervention today  Precautions:  Precautions Comment: room air, R elbow wound, aspiration precautions  Vital Signs:  Vital Signs  Heart Rate:  (VSS)  Pain:  Pain Assessment  Pain Assessment:  (no pain complaints)    Objective    Cognition:  Cognition  Overall Cognitive Status: Within Functional Limits  Coordination:  Finger to Nose:  (improved RUE to wfl)  Activities of Daily Living:           Toileting  Toileting Comments: pt. able to  complete toileting including wiping with R hand and pulling pants up/down over hips with distant supervision  Functional Standing Tolerance:  Functional Standing Tolerance Comments: standing tolerance with and without device, sba overall x 1-2 minutes  Bed Mobility/Transfers:      Transfers  Transfer:  (sit to stand from toilet, distant supervision.)      Functional Mobility:  Functional Mobility  Functional Mobility Performed:  (mobility via wh. walker completed x 150 feet, sba overall, minimal standing rest periods despite max cues for energy conservation)       Outcome Measures:[facility] Daily Activity  Putting on and  taking off regular lower body clothing: A little  Bathing (including washing, rinsing, drying): A little  Putting on and taking off regular upper body clothing: None  Toileting, which includes using toilet, bedpan or urinal: A little  Taking care of personal grooming such as brushing teeth: None  Eating Meals: None  Daily Activity - Total Score: 21        , ICU Mobility Screen  Early Mobility/Exercise Safety Screen: Proceed with mobilization - No exclusion criteria met, and E = Exercise and Early Mobility  Early Mobility/Exercise Safety Screen: Proceed with mobilization - No exclusion criteria met  Current Activity: Ambulating in cobb    Education Documentation  No documentation found.  Education Comments  No comments found.        OP EDUCATION:       Goals:  Encounter Problems       Encounter Problems (Resolved)       OT Goals       increase bue ther ex/activity x 7-10 minutes and increase standing tolerance x 3-5 minutes with cga to promote greater activity tolerance for assist with adl.   (Adequate for Discharge)       Start:  04/16/24    Expected End:  04/25/24    Resolved:  04/22/24         Increase functional mobility and  functional transfers to cga for bed/chair/toilet with dme prn   (Adequate for Discharge)       Start:  04/16/24    Expected End:  04/25/24    Resolved:  04/22/24         Increase grooming/ub bathing to cga with dme prn  (Adequate for Discharge)       Start:  04/16/24    Expected End:  04/25/24    Resolved:  04/22/24           Pt will maintain attention >8 min with min cueing for active participation in functional tasks in preparation for ADLs and continue to be cam (-) during hospital stay (Adequate for Discharge)       Start:  04/16/24    Expected End:  04/25/24    Resolved:  04/22/24         Increase toileting to cga with dme prn  (Adequate for Discharge)       Start:  04/16/24    Expected End:  04/25/24    Resolved:  04/22/24

## 2024-04-22 NOTE — PROGRESS NOTES
Cardiology Progress    Impression:  ETOH intoxication/withdrawal.  Resolved  Right SDH.  Seen by neurosurgery.  Conservative therapy.  NSVT in the setting of hypokalemia.  No recurrence.  P. Afib/flutter. CHADVASC 2.  Now sinus rhythm.  History of GI bleeding.  Anemia.  AV block  s/p PPM  HTN  Plan:  Continue Lopressor  Since patient maintaining sinus rhythm no immediate indication to start anticoagulation.  However he does have paroxysmal A-fib. If patient shows compliance with medical therapy and follow-up, and avoid alcohol consumption, may be candidate for LAAO/Watchman device.  Okay for discharge.  Follow-up in the office 3 to 4 weeks.  HPI:  Doing fine.  No cardiac symptoms.  Remains sinus rhythm.  Meds:  Scheduled medications  allopurinol, 300 mg, oral, Daily  diclofenac sodium, 4 g, Topical, BID  folic acid, 1 mg, oral, Daily  gabapentin, 200 mg, oral, TID  lactulose, 20 g, oral, Daily  levETIRAcetam, 500 mg, oral, BID  lubricating eye drops, 1 drop, Both Eyes, BID  magnesium oxide, 400 mg, oral, Daily  metoprolol tartrate, 12.5 mg, oral, BID  multivitamin with minerals, 1 tablet, oral, Daily  pantoprazole, 40 mg, oral, Daily before breakfast  tamsulosin, 0.4 mg, oral, Daily      Continuous medications     PRN medications  PRN medications: acetaminophen, melatonin, nitroglycerin    Physical exam:  Vitals:    04/22/24 0800   BP: 116/63   Pulse: 72   Resp: 21   Temp: 36.8 °C (98.2 °F)   SpO2: 97%      No JVD.  Chest clear.  No edema.  EKG:  Sinus rhythm.  Ventricular pacing.  Echo:  Normal EF.  Labs:  Lab Results   Component Value Date    WBC 6.4 04/22/2024    HGB 8.3 (L) 04/22/2024    HCT 25.6 (L) 04/22/2024    PLT 91 (L) 04/22/2024    CHOL 179 09/24/2021    TRIG 138 09/24/2021    HDL 47.9 09/24/2021    ALT 31 04/20/2024    AST 52 (H) 04/20/2024     04/22/2024    K 4.0 04/22/2024     (H) 04/22/2024    CREATININE 0.83 04/22/2024    BUN 11 04/22/2024    CO2 23 04/22/2024    TSH 2.91 09/24/2021     INR 1.7 (H) 04/21/2024     par

## 2024-04-22 NOTE — HH CARE COORDINATION
Home Care received a Referral for Physical Therapy and Occupational Therapy. We have processed the referral for a Start of Care on 4/23-4/24/24.     If you have any questions or concerns, please feel free to contact us at [phone]. Follow the prompts, enter your five digit zip code, and you will be directed to your care team on [facility].

## 2024-04-22 NOTE — CARE PLAN
Problem: Pain  Goal: My pain/discomfort is manageable  4/22/2024 1221 by Salma Kenney RN  Outcome: Met  4/22/2024 1221 by Salma Kenney RN  Outcome: Not Progressing     Problem: Safety  Goal: Patient will be injury free during hospitalization  4/22/2024 1221 by Salma Kenney RN  Outcome: Met  4/22/2024 1221 by Salma Kenney RN  Outcome: Not Progressing  Goal: I will remain free of falls  4/22/2024 1221 by Salma Kenney RN  Outcome: Met  4/22/2024 1221 by Salma Kenney RN  Outcome: Not Progressing     Problem: Daily Care  Goal: Daily care needs are met  4/22/2024 1221 by Salma Kenney RN  Outcome: Met  4/22/2024 1221 by Salma Kenney RN  Outcome: Not Progressing     Problem: Psychosocial Needs  Goal: Demonstrates ability to cope with hospitalization/illness  4/22/2024 1221 by Salma Kenney RN  Outcome: Met  4/22/2024 1221 by Salma Kenney RN  Outcome: Not Progressing  Goal: Collaborate with me, my family, and caregiver to identify my specific goals  4/22/2024 1221 by Salma Kenney RN  Outcome: Met  4/22/2024 1221 by Salma Kenney RN  Outcome: Not Progressing     Problem: Discharge Barriers  Goal: My discharge needs are met  4/22/2024 1221 by Salma Kenney RN  Outcome: Met  4/22/2024 1221 by Salma Kenney RN  Outcome: Not Progressing     Problem: Skin  Goal: Decreased wound size/increased tissue granulation at next dressing change  4/22/2024 1221 by Salma Kenney RN  Outcome: Met  4/22/2024 1221 by Salma Kenney RN  Outcome: Not Progressing  Goal: Participates in plan/prevention/treatment measures  4/22/2024 1221 by Salma Kenney RN  Outcome: Met  4/22/2024 1221 by Salma Kenney RN  Outcome: Not Progressing  Goal: Prevent/manage excess moisture  4/22/2024 1221 by Salma Kenney RN  Outcome: Met  4/22/2024 1221 by Salma Kenney RN  Outcome: Not Progressing  Goal: Prevent/minimize sheer/friction  injuries  4/22/2024 1221 by Salma Kenney, RN  Outcome: Met  4/22/2024 1221 by Salma Kenney, RN  Outcome: Not Progressing  Goal: Promote/optimize nutrition  4/22/2024 1221 by Salma Kenney, RN  Outcome: Met  4/22/2024 1221 by Salma Kenney, RN  Outcome: Not Progressing  Goal: Promote skin healing  4/22/2024 1221 by Salma Kenney, RN  Outcome: Met  4/22/2024 1221 by Salma Kenney, RN  Outcome: Not Progressing     Problem: Fall/Injury  Goal: Not fall by end of shift  4/22/2024 1221 by Salma Kenney, RN  Outcome: Met  4/22/2024 1221 by Salma Kenney, RN  Outcome: Not Progressing  Goal: Be free from injury by end of the shift  4/22/2024 1221 by Salma Kenney, RN  Outcome: Met  4/22/2024 1221 by Salma Kenney, RN  Outcome: Not Progressing  Goal: Verbalize understanding of personal risk factors for fall in the hospital  4/22/2024 1221 by Salma Kenney, RN  Outcome: Met  4/22/2024 1221 by Salma Kenney, RN  Outcome: Not Progressing  Goal: Verbalize understanding of risk factor reduction measures to prevent injury from fall in the home  4/22/2024 1221 by Salma Kenney, RN  Outcome: Met  4/22/2024 1221 by Salma Kenney, RN  Outcome: Not Progressing  Goal: Use assistive devices by end of the shift  4/22/2024 1221 by Salma Kenney, RN  Outcome: Met  4/22/2024 1221 by Salma Kenney, RN  Outcome: Not Progressing  Goal: Pace activities to prevent fatigue by end of the shift  4/22/2024 1221 by Salma Kenney, RN  Outcome: Met  4/22/2024 1221 by Salma Kenney, RN  Outcome: Not Progressing

## 2024-04-22 NOTE — PROGRESS NOTES
Discharge in for today. Dispo planning for home w/ [facility]. [facility] already processed with SOC 4/23-4/24. [facility] to contact patient. CO in. Message to [facility] to confirm dc today.     ANAHY DUEÑAS RN TCC

## 2024-04-23 ENCOUNTER — PATIENT OUTREACH (OUTPATIENT)
Dept: CARE COORDINATION | Facility: CLINIC | Age: 64
End: 2024-04-23
Payer: MEDICARE

## 2024-04-23 NOTE — PROGRESS NOTES
Discharge Facility: [facility]  Discharge Diagnosis: chest pain, cardiac arrest, SDH, metabolic encephalopathy  Admission Date: 4/10/24  Discharge Date: 4/22/24    PCP Appointment Date: 4/30/24  Specialist Appointment Date: TBD cardiology Dr Ca  TBD repeat CT and neurosurg  TBD GI  Hospital Encounter and Summary: Linked   See discharge assessment below for further details    Engagement  Call Start Time: 1000 (4/23/2024 10:33 AM)    Medications  Medications reviewed with patient/caregiver?: Yes (4/23/2024 10:33 AM)  Is the patient having any side effects they believe may be caused by any medication additions or changes?: No (4/23/2024 10:33 AM)  Does the patient have all medications ordered at discharge?: Yes (4/23/2024 10:33 AM)  Care Management Interventions: No intervention needed (4/23/2024 10:33 AM)  Prescription Comments: on magnesium oxide 400mgdaily, gabapentin 200mg three times daily, metoprolol tartrate changed to 12.5mg twice daily, voltaren gel as needed, keppra 500mg twice daily (4/23/2024 10:33 AM)  Is the patient taking all medications as directed (includes completed medication regime)?: Yes (4/23/2024 10:33 AM)  Care Management Interventions: Provided patient education (4/23/2024 10:33 AM)  Medication Comments: No issues obtaining medications (4/23/2024 10:33 AM)    Appointments  Does the patient have a primary care provider?: Yes (4/23/2024 10:33 AM)  Care Management Interventions: Verified appointment date/time/provider (4/23/2024 10:33 AM)  Has the patient kept scheduled appointments due by today?: Yes (4/23/2024 10:33 AM)  Care Management Interventions: Advised to schedule with specialist (Aware to call to scheduled CT scan, call to schedule neurosurgery appt, call to schedule GI follow up and also cardiology) (4/23/2024 10:33 AM)  Follow Up Tasks: Appointments (4/23/2024 10:33 AM)    Self Management  What is the home health agency?: [facility] (4/23/2024 10:33 AM)  Has home health visited the  patient within 72 hours of discharge?: Call prior to 72 hours (4/23/2024 10:33 AM)    Patient Teaching  Does the patient have access to their discharge instructions?: Yes (4/23/2024 10:33 AM)  Care Management Interventions: Reviewed instructions with patient (4/23/2024 10:33 AM)  What is the patient's perception of their health status since discharge?: Improving (4/23/2024 10:33 AM)  Is the patient/caregiver able to teach back the hierarchy of who to call/visit for symptoms/problems? PCP, Specialist, Home Health nurse, Urgent Care, ED, 911: Yes (4/23/2024 10:33 AM)  Patient/Caregiver Education Comments: Aware to take medications as ordered, seek care with any new shortness of breath or chest pain, avoid alcohol (4/23/2024 10:33 AM)    Wrap Up  Wrap Up Additional Comments: Trey is doing better since coming home, will plan to call for follow up CT scan of head and also fu appts. Picking up medications at pharmacy. (4/23/2024 10:33 AM)  Call End Time: 1007 (4/23/2024 10:33 AM)

## 2024-04-24 ENCOUNTER — HOME CARE VISIT (OUTPATIENT)
Dept: HOME HEALTH SERVICES | Facility: HOME HEALTH | Age: 64
End: 2024-04-24
Payer: COMMERCIAL

## 2024-04-24 ENCOUNTER — TELEPHONE (OUTPATIENT)
Dept: PRIMARY CARE | Facility: CLINIC | Age: 64
End: 2024-04-24
Payer: COMMERCIAL

## 2024-04-24 VITALS — HEART RATE: 85 BPM | DIASTOLIC BLOOD PRESSURE: 78 MMHG | SYSTOLIC BLOOD PRESSURE: 128 MMHG

## 2024-04-24 LAB
ATRIAL RATE: 92 BPM
DIASTOLIC BLOOD PRESSURE: 58 MMHG
P AXIS: 84 DEGREES
P OFFSET: 145 MS
P ONSET: 110 MS
PR INTERVAL: 182 MS
Q ONSET: 201 MS
QRS COUNT: 16 BEATS
QRS DURATION: 174 MS
QT INTERVAL: 428 MS
QTC CALCULATION(BAZETT): 529 MS
QTC FREDERICIA: 493 MS
R AXIS: 19 DEGREES
SYSTOLIC BLOOD PRESSURE: 125 MMHG
T AXIS: 78 DEGREES
T OFFSET: 415 MS
VENTRICULAR RATE: 92 BPM

## 2024-04-24 PROCEDURE — G0151 HHCP-SERV OF PT,EA 15 MIN: HCPCS

## 2024-04-24 PROCEDURE — 169592 NO-PAY CLAIM PROCEDURE

## 2024-04-24 PROCEDURE — 0023 HH SOC

## 2024-04-24 SDOH — HEALTH STABILITY: PHYSICAL HEALTH: EXERCISE TYPE: STANDING STRENGTHENING

## 2024-04-24 SDOH — HEALTH STABILITY: PHYSICAL HEALTH: EXERCISE COMMENTS: MARCHING, HEEL RAISE, HIP ABDUCTION X 10

## 2024-04-24 ASSESSMENT — ENCOUNTER SYMPTOMS
LOWEST PAIN SEVERITY IN PAST 24 HOURS: 3/10
PAIN SEVERITY GOAL: 2/10
PAIN: 1
PAIN LOCATION - PAIN SEVERITY: 3/10
PAIN LOCATION: BACK
SUBJECTIVE PAIN PROGRESSION: WAXING AND WANING
HIGHEST PAIN SEVERITY IN PAST 24 HOURS: 6/10
PERSON REPORTING PAIN: PATIENT

## 2024-04-24 ASSESSMENT — ACTIVITIES OF DAILY LIVING (ADL)
OASIS_M1830: 01
ENTERING_EXITING_HOME: STAND BY ASSIST
AMBULATION ASSISTANCE ON FLAT SURFACES: 1
AMBULATION_DISTANCE/DURATION_TOLERATED: 75

## 2024-04-24 NOTE — PROGRESS NOTES
Attention [facility] court system,     Please excuse Trey Borjas (1960) juror number # [id number] from jury duty in April 2024 for a period of at least 1 year.  This is due to recent hospitalization and health concerns.    Thank you for your cooperation.    Tyrone Dick DO

## 2024-04-24 NOTE — TELEPHONE ENCOUNTER
Trey called, he has  been summoned for jury duty. Wants to know if we can create an exemption letter for him and fax it.    He was just in the hospital, cardiac arrest and he also has a follow up visit with you     He stated the letter has to include his juror number # [id number]  Fax# [phone]

## 2024-04-25 ENCOUNTER — HOME CARE VISIT (OUTPATIENT)
Dept: HOME HEALTH SERVICES | Facility: HOME HEALTH | Age: 64
End: 2024-04-25
Payer: COMMERCIAL

## 2024-04-25 PROCEDURE — G0152 HHCP-SERV OF OT,EA 15 MIN: HCPCS

## 2024-04-25 ASSESSMENT — ENCOUNTER SYMPTOMS
PAIN: 1
HIGHEST PAIN SEVERITY IN PAST 24 HOURS: 4/10
LOWEST PAIN SEVERITY IN PAST 24 HOURS: 3/10
PERSON REPORTING PAIN: PATIENT

## 2024-04-25 ASSESSMENT — ACTIVITIES OF DAILY LIVING (ADL)
DRESSING_LB_CURRENT_FUNCTION: INDEPENDENT
LAUNDRY: INDEPENDENT
PREPARING MEALS: INDEPENDENT
TOILETING: INDEPENDENT
DRESSING_UB_CURRENT_FUNCTION: INDEPENDENT
FEEDING ASSESSED: 1
FEEDING: INDEPENDENT
BATHING ASSESSED: 1
CURRENT_FUNCTION: INDEPENDENT
BATHING_CURRENT_FUNCTION: SUPERVISION
GROOMING_CURRENT_FUNCTION: INDEPENDENT
TOILETING: 1
GROOMING ASSESSED: 1
PHYSICAL TRANSFERS ASSESSED: 1
LAUNDRY ASSESSED: 1

## 2024-04-25 NOTE — HOME HEALTH
Patient seen alone for OT evaluation visit. Patient was recently hospitalized with atrial flutter, possibly due to drinking alcohol (was off alcohol for 4 months). While he was in the hospital he had cardiac arrest. He states he has been off of alcohol.     Lives with his brother in a condo with 4 steps to enter the front door, and 20 steps to his level. Has a full bathroom with a walk-in shower, laundry off of the kitchen.     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    Patient has a shower chair, current suction cup grab bars (states he is going to get wall mounted grab bars soon), cane, wheeled walker.    Prior to hospitalization, was independent with ADLs and IADLs except he had a cleaning service for heavy cleaning. He drove, didn't use an ambulatory device. 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     Barthel index-95 out of 100.     UE AROM and strength WNL. Patient in agreement with OT POC. Plan to see patient one more time next week to address home safety and showering.

## 2024-04-26 NOTE — CASE COMMUNICATION
OT evaluation completed 4.25.24. Plan to see patient one more time next week to review home and showering safety.

## 2024-04-29 ENCOUNTER — HOME CARE VISIT (OUTPATIENT)
Dept: HOME HEALTH SERVICES | Facility: HOME HEALTH | Age: 64
End: 2024-04-29
Payer: COMMERCIAL

## 2024-04-29 VITALS
HEART RATE: 59 BPM | SYSTOLIC BLOOD PRESSURE: 128 MMHG | DIASTOLIC BLOOD PRESSURE: 70 MMHG | OXYGEN SATURATION: 98 % | TEMPERATURE: 98.8 F

## 2024-04-29 PROCEDURE — G0152 HHCP-SERV OF OT,EA 15 MIN: HCPCS

## 2024-04-29 ASSESSMENT — ENCOUNTER SYMPTOMS
LOWEST PAIN SEVERITY IN PAST 24 HOURS: 0/10
PERSON REPORTING PAIN: PATIENT
PAIN: 1
HIGHEST PAIN SEVERITY IN PAST 24 HOURS: 7/10

## 2024-04-29 ASSESSMENT — ACTIVITIES OF DAILY LIVING (ADL)
HOME_HEALTH_OASIS: 00
OASIS_M1830: 00

## 2024-04-29 NOTE — HOME HEALTH
Patient seen alone for OT DC visit. No further OT visits indicated, patient in agreement. OT DC at this date, goals met. [facility] completed at this date. He is seeing his family doctor tomorrow.     States he is taking Ibuprofen for pain (started in the hospital)-added to med list.     Barthel index-100 out of 100.    DISCHARGE SUMMARY:    DISCIPLINE: Occupational Therapy  DATE OF DISCIPLINE DISCHARGE: 4.29.24.  REASON FOR DISCHARGE: GOALS MET  COORDINATION NOTE: COMPLETED  EVALUATION OF GOALS: GOALS MET  SUMMARY OF CARE PROVIDED: INSTRUCTED ON SAFETY TECHNIQUES AND EQUIPMENT USE FOR SHOWERING.  DISCHARGE INSTRUCTIONS GIVEN: CONTINUE TO FOLLOW SAFETY INSTRUCTIONS.  SERVICES REMAINING: NONE  NOMNC OBTAINED: NA

## 2024-04-30 ENCOUNTER — OFFICE VISIT (OUTPATIENT)
Dept: PRIMARY CARE | Facility: CLINIC | Age: 64
End: 2024-04-30
Payer: COMMERCIAL

## 2024-04-30 VITALS
OXYGEN SATURATION: 91 % | DIASTOLIC BLOOD PRESSURE: 68 MMHG | WEIGHT: 216.2 LBS | SYSTOLIC BLOOD PRESSURE: 126 MMHG | BODY MASS INDEX: 29.32 KG/M2 | HEART RATE: 88 BPM

## 2024-04-30 DIAGNOSIS — I48.92 PAROXYSMAL ATRIAL FLUTTER (MULTI): Primary | ICD-10-CM

## 2024-04-30 PROBLEM — I44.2: Status: ACTIVE | Noted: 2023-07-27

## 2024-04-30 PROBLEM — E87.5 HYPERKALEMIA: Status: ACTIVE | Noted: 2024-04-30

## 2024-04-30 PROBLEM — H60.90 OTITIS EXTERNA: Status: ACTIVE | Noted: 2024-04-30

## 2024-04-30 PROBLEM — H61.20 IMPACTED CERUMEN: Status: ACTIVE | Noted: 2024-04-30

## 2024-04-30 PROBLEM — L73.9 FOLLICULITIS: Status: ACTIVE | Noted: 2024-04-30

## 2024-04-30 PROBLEM — Z86.79 HISTORY OF HEART BLOCK: Status: ACTIVE | Noted: 2024-04-30

## 2024-04-30 PROBLEM — N17.9 ACUTE RENAL FAILURE (CMS-HCC): Status: ACTIVE | Noted: 2024-04-30

## 2024-04-30 PROBLEM — K62.5 RECTAL HEMORRHAGE: Status: ACTIVE | Noted: 2024-04-30

## 2024-04-30 PROCEDURE — 3078F DIAST BP <80 MM HG: CPT | Performed by: STUDENT IN AN ORGANIZED HEALTH CARE EDUCATION/TRAINING PROGRAM

## 2024-04-30 PROCEDURE — 1159F MED LIST DOCD IN RCRD: CPT | Performed by: STUDENT IN AN ORGANIZED HEALTH CARE EDUCATION/TRAINING PROGRAM

## 2024-04-30 PROCEDURE — 1111F DSCHRG MED/CURRENT MED MERGE: CPT | Performed by: STUDENT IN AN ORGANIZED HEALTH CARE EDUCATION/TRAINING PROGRAM

## 2024-04-30 PROCEDURE — 3074F SYST BP LT 130 MM HG: CPT | Performed by: STUDENT IN AN ORGANIZED HEALTH CARE EDUCATION/TRAINING PROGRAM

## 2024-04-30 PROCEDURE — 1160F RVW MEDS BY RX/DR IN RCRD: CPT | Performed by: STUDENT IN AN ORGANIZED HEALTH CARE EDUCATION/TRAINING PROGRAM

## 2024-04-30 PROCEDURE — 1125F AMNT PAIN NOTED PAIN PRSNT: CPT | Performed by: STUDENT IN AN ORGANIZED HEALTH CARE EDUCATION/TRAINING PROGRAM

## 2024-04-30 PROCEDURE — 99495 TRANSJ CARE MGMT MOD F2F 14D: CPT | Performed by: STUDENT IN AN ORGANIZED HEALTH CARE EDUCATION/TRAINING PROGRAM

## 2024-04-30 RX ORDER — CALCIUM CARBONATE/VITAMIN D3 600 MG-10
100 TABLET ORAL DAILY
COMMUNITY
Start: 2024-04-02 | End: 2024-04-30 | Stop reason: SDUPTHER

## 2024-04-30 ASSESSMENT — PAIN SCALES - GENERAL: PAINLEVEL: 5

## 2024-04-30 ASSESSMENT — ENCOUNTER SYMPTOMS: DEPRESSION: 0

## 2024-04-30 NOTE — PROGRESS NOTES
Subjective   Patient ID: Trey Borjas is a 64 y.o. male who presents for Hospital Follow-up (Cardiac arrest.).    HPI history of severe alcoholism he was clean for many months.  He then had a 3-day binge which led to eventual hospital admission cardiac arrest and respiratory arrest.  He got out of the hospital about 8 days ago.  He feels like he is back to his baseline he is talking to his counselor he is going to join AA meetings.  He has appropriate follow-up scheduled he also had a subdural hematoma    Review of Systems  Constitutional: NO F, chills, or sweats  Eyes: no blurred vision or visual disturbance  ENT: no hearing loss, no congestion, no nasal discharge, no hoarseness and no sore throat.   Cardiovascular: no chest pain, no edema, no palps and no syncope.   Respiratory: no cough,no s.o.b. and no wheezing  Gastrointestinal: no abdominal pain, No C/D no N/V, no blood in stools  Genitourinary: no dysuria, no change in urinary frequency, no urinary hesitancy and no feelings of urinary urgency.   Musculoskeletal: no arthralgias,  no back pain and no myalgias.   Integumentary: no new skin lesions and no rashes.   Neurological: Subdural hematoma recent metabolic encephalopathy in hospital admission, feels back to his baseline  Psych-positive alcoholism    Objective   /68 (BP Location: Left arm, Patient Position: Sitting, BP Cuff Size: Adult)   Pulse 88   Wt 98.1 kg (216 lb 3.2 oz)   SpO2 91%   BMI 29.32 kg/m²     Physical Exam  gen- a & o x 3, nad, pleasant  heent- eomi, perrla, ear canals patent, TM's non-erythematous, no fluid, frontal and maxillary sinus's nontender  neck- supple, nontender, no palpable or enlarged nodes, no thyromegaly  heart- rrr, systolic murmur  lungs- cta b/l , no w/r/r    Assessment/Plan     1.  Hospital follow-up.  Cardiac arrest respiratory failure.  Acute metabolic encephalopathy, likely related to drinking and Warnicke's.  Feels back to his baseline now at home for the  past 8 days.  He will continue all current treatments from his specialist.  Disability placard written today.  He is talking to a counselor and he will initiate and join AA.    2.  He will follow-up with cardiology for his paroxysmal a flutter and recent cardiac arrest.    3.  He is going to follow-up with GI provider for gastritis and hepatic failure follow-up.    4.  He is going to have an upcoming CT scan for monitoring of the subdural hematoma and follow-up with neurology.

## 2024-04-30 NOTE — PATIENT INSTRUCTIONS
1.  Hospital follow-up.  Cardiac arrest respiratory failure.  Acute metabolic encephalopathy, likely related to drinking and Warnicke's.  Feels back to his baseline now at home for the past 8 days.  He will continue all current treatments from his specialist.  Disability placard written today.  He is talking to a counselor and he will initiate and join AA.    2.  He will follow-up with cardiology for his paroxysmal a flutter and recent cardiac arrest.    3.  He is going to follow-up with GI provider for gastritis and hepatic failure follow-up.    4.  He is going to have an upcoming CT scan for monitoring of the subdural hematoma and follow-up with neurology.

## 2024-05-01 NOTE — PROGRESS NOTES
Subjective     History of Present Illness:   Trey Borjas is a 64 y.o. male who presents to GI clinic for hospital follow-up  Was in hospital last week for GI bleeding   See note below    Patient is a 64 y.o. male with signficant past medical history of hypertension, gout, alcohol abuse, complete heart block status post pacemaker, NAE, and neuropathy who presents for alcohol intoxication.  He was found to be in a flutter in the ER and cardiology was consulted.  Original plan was to discharge and follow-up as outpatient but patient went through withdrawal. On 4/12 patient had a run of V. tach and CODE BLUE was called.  CPR was initiated.  ROSC was obtained and patient was sent to ICU intubated.  Went for CT head which found a subdural hemorrhage in the right posterior cerebral hemisphere.  Neurosurgery was called who did not recommend surgery.  An NG tube was placed and he was noted to have some blood.  PPI and octreotide were started. He had an EGD that showed small varices grade 1     He said he was only aware of his diagnosis of cirrhosis on recently.  He has been drinking very heavily.  He said he has always been beer drinking but over the last 10 years he has been doing more whiskey sometimes drinks 10 in a day.  He quit drinking a the day that he got admitted to the hospital.  Currently denies any problems.  He is going to the bathroom every day and his primary told him that he can stop the lactulose if he goes regularly.  Denies any swelling.  Denies any bleeding.  He is also due for colonoscopy his last 1 was 5 years ago.  Review of Systems  Review of Systems   Constitutional:  Negative for chills, fever and unexpected weight change.   HENT:  Negative for trouble swallowing.    Respiratory:  Negative for shortness of breath.    Cardiovascular:  Negative for chest pain.   Gastrointestinal:  Negative for abdominal distention, abdominal pain, anal bleeding, blood in stool, constipation, diarrhea, nausea,  rectal pain and vomiting.   Skin:  Negative for color change.       Past Medical History   has a past medical history of Alcohol abuse with withdrawal, unspecified (Multi), Personal history of other diseases of the circulatory system, Personal history of other diseases of the nervous system and sense organs (07/02/2020), Personal history of other diseases of urinary system, Post covid-19 condition, unspecified (12/16/2021), Strain of muscle and tendon of unspecified wall of thorax, initial encounter (09/27/2016), and Unspecified otitis externa, unspecified ear (08/11/2016).     Social History   reports that he has never smoked. He uses smokeless tobacco. He reports current alcohol use of about 56.0 standard drinks of alcohol per week. He reports that he does not use drugs.     Family History  family history is not on file.     Allergies  Allergies   Allergen Reactions    Penicillins Unknown     Childhood allergy    Erythromycin Hives and Itching    Pollen Extracts Itching       Medications  Current Outpatient Medications   Medication Instructions    allopurinol (ZYLOPRIM) 300 mg, oral, Daily    diclofenac sodium (VOLTAREN) 4 g, Topical, 2 times daily    folic acid (FOLVITE) 1 mg, oral, Daily, as directed    gabapentin (NEURONTIN) 200 mg, oral, 3 times daily    lactulose 20 g, oral, Daily    levETIRAcetam (KEPPRA) 500 mg, oral, 2 times daily    magnesium oxide (MAG-OX) 400 mg, oral, Daily    metoprolol tartrate (LOPRESSOR) 12.5 mg, oral, 2 times daily    multivitamin with minerals (multivit-min-iron fum-folic ac) tablet 1 tablet, oral, Daily    pantoprazole (PROTONIX) 40 mg, oral, Daily    tamsulosin (FLOMAX) 0.4 mg, oral, Daily    thiamine (VITAMIN B-1) 100 mg, oral, Daily       Objective   There were no vitals filed for this visit.  Physical Exam  Vitals reviewed.   Constitutional:       Appearance: Normal appearance.   Cardiovascular:      Rate and Rhythm: Normal rate and regular rhythm.      Pulses: Normal  pulses.   Pulmonary:      Effort: Pulmonary effort is normal.      Breath sounds: Normal breath sounds.   Abdominal:      General: Abdomen is flat. Bowel sounds are normal. There is no distension.      Palpations: Abdomen is soft.      Tenderness: There is no abdominal tenderness.   Musculoskeletal:         General: Normal range of motion.   Neurological:      Mental Status: He is alert.      Comments: No asterixis tics                 Assessment/Plan   Trey Borjas is a 64 y.o. male who presents to GI clinic for follow-up of cirrhosis.  He has compensated cirrhosis.  We did talk at length about the importance of alcohol abstinence.  I think this is clearly alcoholic cirrhosis.  He has agreed to be completely abstinent on his own.    We also discussed the complications of cirrhosis.  For now I will do blood work in 3 months and then see him afterwards.  Also schedule for colonoscopy since he is due.  Will evaluate the MELD score in 3 months after he has been abstinent and then determine if he needs referral.  He just had his endoscopy that showed grade 1 varices.  He also had ultrasound during hospitalization that did not show malignancy.    .          Dc Nair MD

## 2024-05-02 ENCOUNTER — OFFICE VISIT (OUTPATIENT)
Dept: GASTROENTEROLOGY | Facility: CLINIC | Age: 64
End: 2024-05-02
Payer: COMMERCIAL

## 2024-05-02 ENCOUNTER — TELEPHONE (OUTPATIENT)
Dept: GASTROENTEROLOGY | Facility: CLINIC | Age: 64
End: 2024-05-02

## 2024-05-02 VITALS
WEIGHT: 217 LBS | BODY MASS INDEX: 29.39 KG/M2 | DIASTOLIC BLOOD PRESSURE: 74 MMHG | HEART RATE: 89 BPM | SYSTOLIC BLOOD PRESSURE: 117 MMHG | HEIGHT: 72 IN

## 2024-05-02 DIAGNOSIS — D50.8 OTHER IRON DEFICIENCY ANEMIA: ICD-10-CM

## 2024-05-02 DIAGNOSIS — K63.5 POLYP OF COLON, UNSPECIFIED PART OF COLON, UNSPECIFIED TYPE: ICD-10-CM

## 2024-05-02 DIAGNOSIS — K70.30 ALCOHOLIC CIRRHOSIS OF LIVER WITHOUT ASCITES (MULTI): Primary | ICD-10-CM

## 2024-05-02 PROCEDURE — 3078F DIAST BP <80 MM HG: CPT | Performed by: INTERNAL MEDICINE

## 2024-05-02 PROCEDURE — 3074F SYST BP LT 130 MM HG: CPT | Performed by: INTERNAL MEDICINE

## 2024-05-02 PROCEDURE — 1160F RVW MEDS BY RX/DR IN RCRD: CPT | Performed by: INTERNAL MEDICINE

## 2024-05-02 PROCEDURE — 1111F DSCHRG MED/CURRENT MED MERGE: CPT | Performed by: INTERNAL MEDICINE

## 2024-05-02 PROCEDURE — 99214 OFFICE O/P EST MOD 30 MIN: CPT | Performed by: INTERNAL MEDICINE

## 2024-05-02 PROCEDURE — 1159F MED LIST DOCD IN RCRD: CPT | Performed by: INTERNAL MEDICINE

## 2024-05-02 RX ORDER — SODIUM, POTASSIUM,MAG SULFATES 17.5-3.13G
1 SOLUTION, RECONSTITUTED, ORAL ORAL 2 TIMES DAILY
Qty: 2 EACH | Refills: 0 | Status: SHIPPED | OUTPATIENT
Start: 2024-05-02

## 2024-05-02 RX ORDER — SODIUM CHLORIDE, SODIUM LACTATE, POTASSIUM CHLORIDE, CALCIUM CHLORIDE 600; 310; 30; 20 MG/100ML; MG/100ML; MG/100ML; MG/100ML
20 INJECTION, SOLUTION INTRAVENOUS CONTINUOUS
OUTPATIENT
Start: 2024-05-02

## 2024-05-02 ASSESSMENT — ENCOUNTER SYMPTOMS
ANAL BLEEDING: 0
ABDOMINAL DISTENTION: 0
NAUSEA: 0
FEVER: 0
BLOOD IN STOOL: 0
RECTAL PAIN: 0
CONSTIPATION: 0
COLOR CHANGE: 0
TROUBLE SWALLOWING: 0
UNEXPECTED WEIGHT CHANGE: 0
DIARRHEA: 0
SHORTNESS OF BREATH: 0
VOMITING: 0
ABDOMINAL PAIN: 0
CHILLS: 0

## 2024-05-07 ENCOUNTER — PATIENT OUTREACH (OUTPATIENT)
Dept: CARE COORDINATION | Facility: CLINIC | Age: 64
End: 2024-05-07
Payer: COMMERCIAL

## 2024-05-07 ENCOUNTER — HOSPITAL ENCOUNTER (OUTPATIENT)
Dept: RADIOLOGY | Facility: HOSPITAL | Age: 64
Discharge: HOME | End: 2024-05-07
Payer: COMMERCIAL

## 2024-05-07 DIAGNOSIS — S06.5XAA SDH (SUBDURAL HEMATOMA) (MULTI): ICD-10-CM

## 2024-05-07 PROCEDURE — 70450 CT HEAD/BRAIN W/O DYE: CPT | Performed by: RADIOLOGY

## 2024-05-07 PROCEDURE — 70450 CT HEAD/BRAIN W/O DYE: CPT

## 2024-05-07 NOTE — PROGRESS NOTES
Call regarding appt. with PCP on 4/30/24 after hospitalization.  At time of outreach call the patient feels as if their condition has improved since last visit.  Reviewed the PCP appointment with the pt and addressed any questions or concerns. Staying away from alcohol, following up with specialists, had CT scan this am which showed stable subdural hematoma.

## 2024-05-20 ENCOUNTER — TELEPHONE (OUTPATIENT)
Dept: PRIMARY CARE | Facility: CLINIC | Age: 64
End: 2024-05-20
Payer: COMMERCIAL

## 2024-05-20 DIAGNOSIS — G62.89 OTHER POLYNEUROPATHY: ICD-10-CM

## 2024-05-20 DIAGNOSIS — S06.5XAA SUBDURAL HEMATOMA (MULTI): ICD-10-CM

## 2024-05-20 DIAGNOSIS — I48.92 ATRIAL FLUTTER, UNSPECIFIED TYPE (MULTI): ICD-10-CM

## 2024-05-20 RX ORDER — LANOLIN ALCOHOL/MO/W.PET/CERES
400 CREAM (GRAM) TOPICAL DAILY
Qty: 30 TABLET | Refills: 2 | Status: SHIPPED | OUTPATIENT
Start: 2024-05-20 | End: 2024-06-19

## 2024-05-20 RX ORDER — LEVETIRACETAM 500 MG/1
500 TABLET ORAL 2 TIMES DAILY
Qty: 60 TABLET | Refills: 2 | Status: SHIPPED | OUTPATIENT
Start: 2024-05-20 | End: 2024-06-19

## 2024-05-20 RX ORDER — GABAPENTIN 100 MG/1
200 CAPSULE ORAL 3 TIMES DAILY
Qty: 540 CAPSULE | Refills: 3 | Status: SHIPPED | OUTPATIENT
Start: 2024-05-20 | End: 2025-05-20

## 2024-05-20 RX ORDER — METOPROLOL TARTRATE 25 MG/1
12.5 TABLET, FILM COATED ORAL 2 TIMES DAILY
Qty: 90 TABLET | Refills: 3 | Status: SHIPPED | OUTPATIENT
Start: 2024-05-20 | End: 2025-05-20

## 2024-05-20 NOTE — TELEPHONE ENCOUNTER
Trey called, he wants to know if you can refill his gabapentin and metoprolol  He also stated the Hospital had him taking the Levetiracetam 500mg and magnesium 400  Wanted to know if those 2 were just temporary or would he need refills on them

## 2024-05-22 ENCOUNTER — OFFICE VISIT (OUTPATIENT)
Dept: NEUROSURGERY | Facility: CLINIC | Age: 64
End: 2024-05-22
Payer: COMMERCIAL

## 2024-05-22 VITALS
SYSTOLIC BLOOD PRESSURE: 112 MMHG | HEART RATE: 93 BPM | TEMPERATURE: 97.8 F | HEIGHT: 72 IN | DIASTOLIC BLOOD PRESSURE: 74 MMHG | BODY MASS INDEX: 26.82 KG/M2 | WEIGHT: 198 LBS

## 2024-05-22 DIAGNOSIS — S06.5XAA SDH (SUBDURAL HEMATOMA) (MULTI): Primary | ICD-10-CM

## 2024-05-22 PROCEDURE — 99213 OFFICE O/P EST LOW 20 MIN: CPT | Performed by: NURSE PRACTITIONER

## 2024-05-22 PROCEDURE — 1160F RVW MEDS BY RX/DR IN RCRD: CPT | Performed by: NURSE PRACTITIONER

## 2024-05-22 PROCEDURE — 1125F AMNT PAIN NOTED PAIN PRSNT: CPT | Performed by: NURSE PRACTITIONER

## 2024-05-22 PROCEDURE — 3078F DIAST BP <80 MM HG: CPT | Performed by: NURSE PRACTITIONER

## 2024-05-22 PROCEDURE — 3074F SYST BP LT 130 MM HG: CPT | Performed by: NURSE PRACTITIONER

## 2024-05-22 PROCEDURE — 1159F MED LIST DOCD IN RCRD: CPT | Performed by: NURSE PRACTITIONER

## 2024-05-22 PROCEDURE — 1111F DSCHRG MED/CURRENT MED MERGE: CPT | Performed by: NURSE PRACTITIONER

## 2024-05-22 ASSESSMENT — LIFESTYLE VARIABLES
HOW MANY STANDARD DRINKS CONTAINING ALCOHOL DO YOU HAVE ON A TYPICAL DAY: PATIENT DECLINED
HOW OFTEN DO YOU HAVE SIX OR MORE DRINKS ON ONE OCCASION: PATIENT DECLINED
HOW OFTEN DO YOU HAVE A DRINK CONTAINING ALCOHOL: PATIENT DECLINED
SKIP TO QUESTIONS 9-10: 0
AUDIT-C TOTAL SCORE: -1

## 2024-05-22 ASSESSMENT — PATIENT HEALTH QUESTIONNAIRE - PHQ9
SUM OF ALL RESPONSES TO PHQ9 QUESTIONS 1 & 2: 0
1. LITTLE INTEREST OR PLEASURE IN DOING THINGS: NOT AT ALL
2. FEELING DOWN, DEPRESSED OR HOPELESS: NOT AT ALL

## 2024-05-22 ASSESSMENT — PAIN SCALES - GENERAL: PAINLEVEL: 7

## 2024-06-07 ENCOUNTER — PATIENT OUTREACH (OUTPATIENT)
Dept: CARE COORDINATION | Facility: CLINIC | Age: 64
End: 2024-06-07
Payer: COMMERCIAL

## 2024-07-16 ENCOUNTER — PATIENT OUTREACH (OUTPATIENT)
Dept: CARE COORDINATION | Facility: CLINIC | Age: 64
End: 2024-07-16
Payer: COMMERCIAL

## 2024-07-16 NOTE — PROGRESS NOTES
Final call. Called and spoke with patient to address and questions or concerns regarding hospitalization.   Patient reports their condition has improved. Feeling grateful for all his care after the hospital stay. Focusing on exercising and staying busy. He will call for new PCP appt soon.  Patient encouraged to keep my contact information in case any needs arise.

## 2024-07-29 ENCOUNTER — LAB (OUTPATIENT)
Dept: LAB | Facility: LAB | Age: 64
End: 2024-07-29
Payer: MEDICARE

## 2024-07-29 ENCOUNTER — APPOINTMENT (OUTPATIENT)
Dept: PRIMARY CARE | Facility: CLINIC | Age: 64
End: 2024-07-29
Payer: MEDICARE

## 2024-07-29 VITALS
SYSTOLIC BLOOD PRESSURE: 120 MMHG | BODY MASS INDEX: 31.79 KG/M2 | WEIGHT: 227.1 LBS | HEART RATE: 77 BPM | HEIGHT: 71 IN | DIASTOLIC BLOOD PRESSURE: 69 MMHG

## 2024-07-29 DIAGNOSIS — G62.89 OTHER POLYNEUROPATHY: ICD-10-CM

## 2024-07-29 DIAGNOSIS — K70.30 ALCOHOLIC CIRRHOSIS OF LIVER WITHOUT ASCITES (MULTI): ICD-10-CM

## 2024-07-29 DIAGNOSIS — R60.0 BILATERAL LOWER EXTREMITY EDEMA: Primary | ICD-10-CM

## 2024-07-29 DIAGNOSIS — Z00.00 ROUTINE GENERAL MEDICAL EXAMINATION AT A HEALTH CARE FACILITY: ICD-10-CM

## 2024-07-29 PROCEDURE — 85610 PROTHROMBIN TIME: CPT

## 2024-07-29 PROCEDURE — 1160F RVW MEDS BY RX/DR IN RCRD: CPT | Performed by: INTERNAL MEDICINE

## 2024-07-29 PROCEDURE — 1159F MED LIST DOCD IN RCRD: CPT | Performed by: INTERNAL MEDICINE

## 2024-07-29 PROCEDURE — 80053 COMPREHEN METABOLIC PANEL: CPT

## 2024-07-29 PROCEDURE — 85025 COMPLETE CBC W/AUTO DIFF WBC: CPT

## 2024-07-29 PROCEDURE — 3074F SYST BP LT 130 MM HG: CPT | Performed by: INTERNAL MEDICINE

## 2024-07-29 PROCEDURE — 99213 OFFICE O/P EST LOW 20 MIN: CPT | Performed by: INTERNAL MEDICINE

## 2024-07-29 PROCEDURE — 3008F BODY MASS INDEX DOCD: CPT | Performed by: INTERNAL MEDICINE

## 2024-07-29 PROCEDURE — 3078F DIAST BP <80 MM HG: CPT | Performed by: INTERNAL MEDICINE

## 2024-07-29 RX ORDER — GABAPENTIN 100 MG/1
200 CAPSULE ORAL 3 TIMES DAILY
Qty: 540 CAPSULE | Refills: 3 | Status: CANCELLED | OUTPATIENT
Start: 2024-07-29 | End: 2025-07-29

## 2024-07-29 RX ORDER — GABAPENTIN 100 MG/1
200 CAPSULE ORAL 3 TIMES DAILY
Qty: 360 CAPSULE | Refills: 3 | Status: SHIPPED | OUTPATIENT
Start: 2024-07-29

## 2024-07-29 ASSESSMENT — ENCOUNTER SYMPTOMS: SHORTNESS OF BREATH: 0

## 2024-07-29 NOTE — PROGRESS NOTES
"Subjective   Patient ID: Trey Borjas is a 64 y.o. male who presents for Establish Care and Foot Swelling ([place] swelling).    Here to get established from Dr. Dick's office. Wants to discuss LE swelling that started after his hospital discharge 3 months ago. Has been unchanged in size beyond maybe improving over the last couple days. Has had nerve pain that has been worsened since legs became more swollen as well. Tried voltaren gel which provided some relief, but is stopping that d/t his up-coming colonoscopy (in 2 weeks). Will trial capsaicin cream instead.    Haven't had caffeine in his coffee since getting out of the hospital. Has instead been drinking green tea that has no caffeine.        Review of Systems   Respiratory:  Negative for shortness of breath.    Cardiovascular:  Positive for leg swelling. Negative for chest pain.       /69 (BP Location: Right arm, Patient Position: Sitting, BP Cuff Size: Adult)   Pulse 77   Ht 1.795 m (5' 10.67\")   Wt 103 kg (227 lb 1.6 oz)   BMI 31.97 kg/m²   Objective   Physical Exam  Constitutional:       General: He is not in acute distress.     Appearance: He is obese. He is not ill-appearing, toxic-appearing or diaphoretic.   HENT:      Head: Normocephalic and atraumatic.   Eyes:      General: No scleral icterus.     Conjunctiva/sclera: Conjunctivae normal.   Cardiovascular:      Rate and Rhythm: Normal rate and regular rhythm.      Heart sounds: No murmur heard.     No friction rub. No gallop.   Pulmonary:      Effort: Pulmonary effort is normal. No respiratory distress.      Breath sounds: No stridor. No wheezing, rhonchi or rales.   Abdominal:      General: Abdomen is flat. There is no distension.      Palpations: Abdomen is soft.      Tenderness: There is no abdominal tenderness. There is no guarding.   Musculoskeletal:      Right lower leg: Edema (2+) present.      Left lower leg: Edema (2+) present.   Skin:     General: Skin is warm and dry. "   Neurological:      Mental Status: He is alert.         Assessment/Plan   Problem List Items Addressed This Visit             ICD-10-CM    Alcoholic cirrhosis of liver without ascites (Multi) K70.30     -Seeing Dr. Nair from GI. Colonoscopy planned in 2 weeks.          Other Visit Diagnoses         Codes    Bilateral lower extremity edema    -  Primary R60.0    Other polyneuropathy     G62.89    Relevant Medications    gabapentin (Neurontin) 100 mg capsule    Routine general medical examination at a health care facility     Z00.00    Relevant Orders    Tsh With Reflex To Free T4 If Abnormal    Hemoglobin A1c        -Refilled gabapentin for neuropathy.  -Reviewed that LE edema differential diagnosis includes CHF, cirrhosis, DVT, or medication side effects. TTE from 4/2024 reviewed; less likely CHF is contributing. More likely that this is from cirrhosis instead. Discussed start lasix/aldactone but pt would favor trying compression stockings (already owns them). To start wearing them moving forward. Will follow up next appt to see if lasix needs to be started.  -To come back in 1 month for initial MAW.         Aleksandra Bello MD 07/29/24 1:08 PM

## 2024-07-30 LAB
ALBUMIN SERPL BCP-MCNC: 3.4 G/DL (ref 3.4–5)
ALP SERPL-CCNC: 297 U/L (ref 33–136)
ALT SERPL W P-5'-P-CCNC: 21 U/L (ref 10–52)
ANION GAP SERPL CALC-SCNC: 14 MMOL/L (ref 10–20)
AST SERPL W P-5'-P-CCNC: 42 U/L (ref 9–39)
BASOPHILS # BLD AUTO: 0.05 X10*3/UL (ref 0–0.1)
BASOPHILS NFR BLD AUTO: 1.1 %
BILIRUB SERPL-MCNC: 4.2 MG/DL (ref 0–1.2)
BUN SERPL-MCNC: 12 MG/DL (ref 6–23)
BURR CELLS BLD QL SMEAR: NORMAL
CALCIUM SERPL-MCNC: 9.3 MG/DL (ref 8.6–10.6)
CHLORIDE SERPL-SCNC: 110 MMOL/L (ref 98–107)
CO2 SERPL-SCNC: 23 MMOL/L (ref 21–32)
CREAT SERPL-MCNC: 0.73 MG/DL (ref 0.5–1.3)
EGFRCR SERPLBLD CKD-EPI 2021: >90 ML/MIN/1.73M*2
EOSINOPHIL # BLD AUTO: 0.12 X10*3/UL (ref 0–0.7)
EOSINOPHIL NFR BLD AUTO: 2.6 %
ERYTHROCYTE [DISTWIDTH] IN BLOOD BY AUTOMATED COUNT: 23.9 % (ref 11.5–14.5)
EST. AVERAGE GLUCOSE BLD GHB EST-MCNC: 97 MG/DL
GLUCOSE SERPL-MCNC: 109 MG/DL (ref 74–99)
HBA1C MFR BLD: 5 %
HCT VFR BLD AUTO: 28.9 % (ref 41–52)
HGB BLD-MCNC: 9 G/DL (ref 13.5–17.5)
HYPOCHROMIA BLD QL SMEAR: NORMAL
IMM GRANULOCYTES # BLD AUTO: 0.03 X10*3/UL (ref 0–0.7)
IMM GRANULOCYTES NFR BLD AUTO: 0.7 % (ref 0–0.9)
INR PPP: 1.7 (ref 0.9–1.1)
LYMPHOCYTES # BLD AUTO: 0.65 X10*3/UL (ref 1.2–4.8)
LYMPHOCYTES NFR BLD AUTO: 14.2 %
MCH RBC QN AUTO: 28.8 PG (ref 26–34)
MCHC RBC AUTO-ENTMCNC: 31.1 G/DL (ref 32–36)
MCV RBC AUTO: 93 FL (ref 80–100)
MONOCYTES # BLD AUTO: 0.7 X10*3/UL (ref 0.1–1)
MONOCYTES NFR BLD AUTO: 15.3 %
NEUTROPHILS # BLD AUTO: 3.02 X10*3/UL (ref 1.2–7.7)
NEUTROPHILS NFR BLD AUTO: 66.1 %
NRBC BLD-RTO: 0 /100 WBCS (ref 0–0)
PLATELET # BLD AUTO: 90 X10*3/UL (ref 150–450)
POLYCHROMASIA BLD QL SMEAR: NORMAL
POTASSIUM SERPL-SCNC: 3.8 MMOL/L (ref 3.5–5.3)
PROT SERPL-MCNC: 7 G/DL (ref 6.4–8.2)
PROTHROMBIN TIME: 19.2 SECONDS (ref 9.8–12.8)
RBC # BLD AUTO: 3.12 X10*6/UL (ref 4.5–5.9)
RBC MORPH BLD: NORMAL
SODIUM SERPL-SCNC: 143 MMOL/L (ref 136–145)
TSH SERPL-ACNC: 3.34 MIU/L (ref 0.44–3.98)
WBC # BLD AUTO: 4.6 X10*3/UL (ref 4.4–11.3)

## 2024-08-12 ENCOUNTER — ANESTHESIA EVENT (OUTPATIENT)
Dept: GASTROENTEROLOGY | Facility: HOSPITAL | Age: 64
End: 2024-08-12
Payer: MEDICARE

## 2024-08-12 ENCOUNTER — ANESTHESIA (OUTPATIENT)
Dept: GASTROENTEROLOGY | Facility: HOSPITAL | Age: 64
End: 2024-08-12
Payer: MEDICARE

## 2024-08-12 ENCOUNTER — HOSPITAL ENCOUNTER (OUTPATIENT)
Dept: GASTROENTEROLOGY | Facility: HOSPITAL | Age: 64
Discharge: HOME | End: 2024-08-12
Payer: MEDICARE

## 2024-08-12 VITALS
SYSTOLIC BLOOD PRESSURE: 132 MMHG | RESPIRATION RATE: 18 BRPM | HEART RATE: 72 BPM | TEMPERATURE: 96.8 F | WEIGHT: 227.07 LBS | BODY MASS INDEX: 31.79 KG/M2 | DIASTOLIC BLOOD PRESSURE: 67 MMHG | OXYGEN SATURATION: 97 % | HEIGHT: 71 IN

## 2024-08-12 DIAGNOSIS — K63.5 POLYP OF COLON, UNSPECIFIED PART OF COLON, UNSPECIFIED TYPE: ICD-10-CM

## 2024-08-12 DIAGNOSIS — K70.30 ALCOHOLIC CIRRHOSIS OF LIVER WITHOUT ASCITES (MULTI): Primary | ICD-10-CM

## 2024-08-12 PROCEDURE — A45385 PR COLONOSCOPY,REMV LESN,SNARE: Performed by: ANESTHESIOLOGY

## 2024-08-12 PROCEDURE — 2500000004 HC RX 250 GENERAL PHARMACY W/ HCPCS (ALT 636 FOR OP/ED): Performed by: NURSE ANESTHETIST, CERTIFIED REGISTERED

## 2024-08-12 PROCEDURE — 2500000004 HC RX 250 GENERAL PHARMACY W/ HCPCS (ALT 636 FOR OP/ED): Performed by: INTERNAL MEDICINE

## 2024-08-12 PROCEDURE — 88305 TISSUE EXAM BY PATHOLOGIST: CPT | Mod: TC,PARLAB | Performed by: INTERNAL MEDICINE

## 2024-08-12 PROCEDURE — A45385 PR COLONOSCOPY,REMV LESN,SNARE: Performed by: NURSE ANESTHETIST, CERTIFIED REGISTERED

## 2024-08-12 PROCEDURE — 2500000005 HC RX 250 GENERAL PHARMACY W/O HCPCS: Performed by: NURSE ANESTHETIST, CERTIFIED REGISTERED

## 2024-08-12 PROCEDURE — 3700000001 HC GENERAL ANESTHESIA TIME - INITIAL BASE CHARGE

## 2024-08-12 PROCEDURE — 3700000002 HC GENERAL ANESTHESIA TIME - EACH INCREMENTAL 1 MINUTE

## 2024-08-12 PROCEDURE — 7100000010 HC PHASE TWO TIME - EACH INCREMENTAL 1 MINUTE

## 2024-08-12 PROCEDURE — 45385 COLONOSCOPY W/LESION REMOVAL: CPT | Performed by: INTERNAL MEDICINE

## 2024-08-12 PROCEDURE — 7100000009 HC PHASE TWO TIME - INITIAL BASE CHARGE

## 2024-08-12 PROCEDURE — 45381 COLONOSCOPY SUBMUCOUS NJX: CPT | Performed by: INTERNAL MEDICINE

## 2024-08-12 RX ORDER — PROPOFOL 10 MG/ML
INJECTION, EMULSION INTRAVENOUS AS NEEDED
Status: DISCONTINUED | OUTPATIENT
Start: 2024-08-12 | End: 2024-08-12

## 2024-08-12 RX ORDER — SODIUM CHLORIDE, SODIUM LACTATE, POTASSIUM CHLORIDE, CALCIUM CHLORIDE 600; 310; 30; 20 MG/100ML; MG/100ML; MG/100ML; MG/100ML
20 INJECTION, SOLUTION INTRAVENOUS CONTINUOUS
Status: DISCONTINUED | OUTPATIENT
Start: 2024-08-12 | End: 2024-08-13 | Stop reason: HOSPADM

## 2024-08-12 RX ORDER — LIDOCAINE HCL/PF 100 MG/5ML
SYRINGE (ML) INTRAVENOUS AS NEEDED
Status: DISCONTINUED | OUTPATIENT
Start: 2024-08-12 | End: 2024-08-12

## 2024-08-12 SDOH — HEALTH STABILITY: MENTAL HEALTH: CURRENT SMOKER: 0

## 2024-08-12 ASSESSMENT — PAIN SCALES - GENERAL
PAINLEVEL_OUTOF10: 0 - NO PAIN
PAINLEVEL_OUTOF10: 5 - MODERATE PAIN
PAINLEVEL_OUTOF10: 0 - NO PAIN
PAIN_LEVEL: 0

## 2024-08-12 ASSESSMENT — PAIN - FUNCTIONAL ASSESSMENT
PAIN_FUNCTIONAL_ASSESSMENT: 0-10

## 2024-08-12 ASSESSMENT — PAIN DESCRIPTION - DESCRIPTORS: DESCRIPTORS: ACHING;BURNING

## 2024-08-12 NOTE — ANESTHESIA POSTPROCEDURE EVALUATION
Patient: Trey Borjas    Procedure Summary       Date: 08/12/24 Room / Location: [facility]    Anesthesia Start: 0845 Anesthesia Stop:     Procedure: COLONOSCOPY Diagnosis:       Polyp of colon, unspecified part of colon, unspecified type      Alcoholic cirrhosis of liver without ascites (Multi)    Scheduled Providers: Dc Nair MD; Ceasar Thompson MD Responsible Provider: Ceasar Thompson MD    Anesthesia Type: MAC ASA Status: 3            Anesthesia Type: MAC    Vitals Value Taken Time   /62 08/12/24 0909   Temp 36 °C (96.8 °F) 08/12/24 0908   Pulse 83 08/12/24 0908   Resp 18 08/12/24 0908   SpO2 97 % 08/12/24 0908   Vitals shown include unfiled device data.    Anesthesia Post Evaluation    Patient location during evaluation: PACU  Patient participation: complete - patient participated  Level of consciousness: awake  Pain score: 0  Pain management: adequate  Airway patency: patent  Cardiovascular status: acceptable, blood pressure returned to baseline and hemodynamically stable  Respiratory status: acceptable and nasal cannula  Hydration status: acceptable  Postoperative Nausea and Vomiting: none        There were no known notable events for this encounter.

## 2024-08-12 NOTE — H&P
Procedure H&P    Patient Profile-Procedures  Name Trey Borjas  Date of Birth 1960  MRN [id number]  Address   [address]  [place] [id number] [address]  [place] [address]    Primary Phone Number [phone]  Secondary Phone Number    Tyrone Juarez    Procedure(s):  Procedures: Colonoscopy  Primary contact name and number   Extended Emergency Contact Information  Primary Emergency Contact: eFlipe Borjas  Home Phone: [phone]  Relation: Sibling    General Health  Weight   Vitals:    08/12/24 0733   Weight: 103 kg (227 lb 1.2 oz)     BMI Body mass index is 31.67 kg/m².    Allergies  Allergies   Allergen Reactions    Penicillins Unknown     Childhood allergy    Erythromycin Hives and Itching    Pollen Extracts Itching       Past Medical History   Past Medical History:   Diagnosis Date    Alcohol abuse with withdrawal, unspecified (Multi)     Alcohol abuse with withdrawal    Personal history of other diseases of the circulatory system     History of complete atrioventricular block    Personal history of other diseases of the nervous system and sense organs 07/02/2020    History of impacted cerumen    Personal history of other diseases of urinary system     History of acute renal failure    Post covid-19 condition, unspecified 12/16/2021    Post-COVID-19 condition    Strain of muscle and tendon of unspecified wall of thorax, initial encounter 09/27/2016    Strain of thoracic region, initial encounter    Unspecified otitis externa, unspecified ear 08/11/2016    Otitis externa       Provider assessment  Diagnosis: Colon Cancer Screening/Surveillance   Medication Reviewed - yes  Prior to Admission medications    Medication Sig Start Date End Date Taking? Authorizing Provider   allopurinol (Zyloprim) 300 mg tablet TAKE 1 TABLET BY MOUTH EVERY DAY 12/26/23  Yes Tyrone Dick DO   folic acid (Folvite) 1 mg tablet TAKE 1 TABLET BY MOUTH EVERY DAY AS DIRECTED 12/28/23  Yes  Tyrone Dick, DO   gabapentin (Neurontin) 100 mg capsule Take 2 capsules (200 mg) by mouth 3 times a day. 7/29/24  Yes Aleksandra Bello MD   levETIRAcetam (Keppra) 500 mg tablet Take 1 tablet (500 mg) by mouth 2 times a day. 5/20/24 8/12/24 Yes Tyrone Dick DO   metoprolol tartrate (Lopressor) 25 mg tablet Take 0.5 tablets (12.5 mg) by mouth 2 times a day. 5/20/24 5/20/25 Yes Tyrone Dick DO   multivitamin with minerals (multivit-min-iron fum-folic ac) tablet Take 1 tablet by mouth once daily.   Yes Historical Provider, MD   pantoprazole (ProtoNix) 40 mg EC tablet TAKE 1 TABLET BY MOUTH EVERY DAY 12/26/23  Yes Tyrone Dick DO   tamsulosin (Flomax) 0.4 mg 24 hr capsule TAKE 1 CAPSULE BY MOUTH EVERY DAY 12/26/23  Yes Tyrone Dick DO   sodium,potassium,mag sulfates (Suprep) 17.5-3.13-1.6 gram recon soln solution Take 1 bottle by mouth 2 times a day.  Patient not taking: Reported on 8/12/2024 5/2/24 8/12/24  Dc Nair MD       Physical Exam  Vitals:    08/12/24 0733   BP: 135/80   Pulse: 92   Resp: 18   Temp: 36.5 °C (97.7 °F)   SpO2: 99%        General: A&Ox3, NAD.  HEENT: AT/NC.   CV: RRR. No murmur.  Resp: CTA bilaterally. No wheezing, rhonchi or rales.   GI: Soft, NT/ND. BSx4.  Extrem: No edema. Pulses intact.  Neuro: No focal deficits.   Psych: Normal mood and affect.      Procedure Plan - pre-procedural (re)assesment completed by physician:  discharge/transfer patient when discharge criteria met    Dc Nair MD  8/12/2024 8:46 AM

## 2024-08-12 NOTE — ANESTHESIA PREPROCEDURE EVALUATION
Patient: Trey Borjas    Procedure Information       Date/Time: 08/12/24 0830    Scheduled providers: Dc Nair MD; Ceasar Thompson MD    Procedure: COLONOSCOPY    Location: [facility]            Relevant Problems   Anesthesia (within normal limits)      Cardiac   (+) Atrial flutter, unspecified type (Multi)   (+) Benign essential HTN   (+) Pacemaker   (+) Paroxysmal atrial flutter (Multi)   (+) Third degree atrioventricular block determined by electrocardiography (Multi)      Pulmonary   (+) Chronic obstructive pulmonary disease with acute lower respiratory infection (Multi)      Neuro   (+) Polyneuropathy      GI   (+) Rectal hemorrhage      /Renal   (+) Malignant neoplasm of prostate (Multi)      Liver   (+) Alcoholic cirrhosis of liver without ascites (Multi)   (+) Steatosis of liver      Endocrine   (+) Hypothyroidism      Musculoskeletal   (+) Degeneration of intervertebral disc of lumbar region   (+) Rheumatoid arthritis (Multi)   (+) Spinal stenosis of lumbar region      ID   (+) Chronic obstructive pulmonary disease with acute lower respiratory infection (Multi)       Clinical information reviewed:   Tobacco  Allergies  Meds   Med Hx  Surg Hx   Fam Hx          NPO Detail:  NPO/Void Status  Carbohydrate Drink Given Prior to Surgery? : N  Date of Last Liquid: 08/12/24  Time of Last Liquid: 0600  Date of Last Solid: 08/11/24  Time of Last Solid: 0800  Last Intake Type: Clear fluids         Physical Exam    Airway  Mallampati: III  TM distance: >3 FB  Neck ROM: full     Cardiovascular - normal exam  Rhythm: regular  Rate: normal     Dental - normal exam     Pulmonary - normal exam     Abdominal            Anesthesia Plan    History of general anesthesia?: yes  History of complications of general anesthesia?: no    ASA 3     MAC     The patient is not a current smoker.    intravenous induction   Anesthetic plan and risks discussed with patient.    Plan discussed with CRNA.

## 2024-08-18 DIAGNOSIS — S06.5XAA SUBDURAL HEMATOMA (MULTI): ICD-10-CM

## 2024-08-20 LAB
LABORATORY COMMENT REPORT: NORMAL
PATH REPORT.FINAL DX SPEC: NORMAL
PATH REPORT.GROSS SPEC: NORMAL
PATH REPORT.TOTAL CANCER: NORMAL
RESIDENT REVIEW: NORMAL

## 2024-08-22 ENCOUNTER — TELEPHONE (OUTPATIENT)
Dept: PRIMARY CARE | Facility: CLINIC | Age: 64
End: 2024-08-22
Payer: MEDICARE

## 2024-08-22 DIAGNOSIS — S06.5XAA SUBDURAL HEMATOMA (MULTI): ICD-10-CM

## 2024-08-22 RX ORDER — LEVETIRACETAM 500 MG/1
500 TABLET ORAL 2 TIMES DAILY
Qty: 60 TABLET | Refills: 2 | OUTPATIENT
Start: 2024-08-22

## 2024-08-22 RX ORDER — LEVETIRACETAM 500 MG/1
500 TABLET ORAL 2 TIMES DAILY
Qty: 60 TABLET | Refills: 2 | Status: SHIPPED | OUTPATIENT
Start: 2024-08-22

## 2024-08-22 NOTE — TELEPHONE ENCOUNTER
Patient has no more refills on Keppra 500 mg he says he received in the hospital? He isn't sure if he is suppose to continue, He he needs to continue this med please send a refill to [facility].

## 2024-08-22 NOTE — TELEPHONE ENCOUNTER
Unclear per documentation. NSGY didn't comment when pt last seen but stable SDH noted. Since it didn't completely resolve will refill.

## 2024-09-04 ENCOUNTER — APPOINTMENT (OUTPATIENT)
Dept: PRIMARY CARE | Facility: CLINIC | Age: 64
End: 2024-09-04
Payer: MEDICARE

## 2024-09-04 ENCOUNTER — LAB (OUTPATIENT)
Dept: LAB | Facility: LAB | Age: 64
End: 2024-09-04
Payer: MEDICARE

## 2024-09-04 VITALS
BODY MASS INDEX: 30.1 KG/M2 | DIASTOLIC BLOOD PRESSURE: 83 MMHG | HEART RATE: 76 BPM | WEIGHT: 215 LBS | OXYGEN SATURATION: 98 % | HEIGHT: 71 IN | SYSTOLIC BLOOD PRESSURE: 146 MMHG

## 2024-09-04 DIAGNOSIS — M06.9 RHEUMATOID ARTHRITIS, INVOLVING UNSPECIFIED SITE, UNSPECIFIED WHETHER RHEUMATOID FACTOR PRESENT (MULTI): ICD-10-CM

## 2024-09-04 DIAGNOSIS — Z00.00 MEDICARE ANNUAL WELLNESS VISIT, SUBSEQUENT: Primary | ICD-10-CM

## 2024-09-04 DIAGNOSIS — Z71.89 GOALS OF CARE, COUNSELING/DISCUSSION: ICD-10-CM

## 2024-09-04 PROBLEM — C61 MALIGNANT NEOPLASM OF PROSTATE (MULTI): Status: RESOLVED | Noted: 2023-07-27 | Resolved: 2024-09-04

## 2024-09-04 LAB
CCP IGG SERPL-ACNC: <1 U/ML
RHEUMATOID FACT SER NEPH-ACNC: 106 IU/ML (ref 0–15)

## 2024-09-04 PROCEDURE — 3077F SYST BP >= 140 MM HG: CPT | Performed by: INTERNAL MEDICINE

## 2024-09-04 PROCEDURE — 86200 CCP ANTIBODY: CPT

## 2024-09-04 PROCEDURE — 1123F ACP DISCUSS/DSCN MKR DOCD: CPT | Performed by: INTERNAL MEDICINE

## 2024-09-04 PROCEDURE — 36415 COLL VENOUS BLD VENIPUNCTURE: CPT

## 2024-09-04 PROCEDURE — 1160F RVW MEDS BY RX/DR IN RCRD: CPT | Performed by: INTERNAL MEDICINE

## 2024-09-04 PROCEDURE — 1036F TOBACCO NON-USER: CPT | Performed by: INTERNAL MEDICINE

## 2024-09-04 PROCEDURE — 3079F DIAST BP 80-89 MM HG: CPT | Performed by: INTERNAL MEDICINE

## 2024-09-04 PROCEDURE — 1158F ADVNC CARE PLAN TLK DOCD: CPT | Performed by: INTERNAL MEDICINE

## 2024-09-04 PROCEDURE — G0439 PPPS, SUBSEQ VISIT: HCPCS | Performed by: INTERNAL MEDICINE

## 2024-09-04 PROCEDURE — 1170F FXNL STATUS ASSESSED: CPT | Performed by: INTERNAL MEDICINE

## 2024-09-04 PROCEDURE — 1159F MED LIST DOCD IN RCRD: CPT | Performed by: INTERNAL MEDICINE

## 2024-09-04 PROCEDURE — 86431 RHEUMATOID FACTOR QUANT: CPT

## 2024-09-04 PROCEDURE — 3008F BODY MASS INDEX DOCD: CPT | Performed by: INTERNAL MEDICINE

## 2024-09-04 RX ORDER — FUROSEMIDE 20 MG/1
1 TABLET ORAL
COMMUNITY
Start: 2024-08-28

## 2024-09-04 ASSESSMENT — PATIENT HEALTH QUESTIONNAIRE - PHQ9
2. FEELING DOWN, DEPRESSED OR HOPELESS: NOT AT ALL
SUM OF ALL RESPONSES TO PHQ9 QUESTIONS 1 AND 2: 0
1. LITTLE INTEREST OR PLEASURE IN DOING THINGS: NOT AT ALL

## 2024-09-04 ASSESSMENT — ACTIVITIES OF DAILY LIVING (ADL)
DOING_HOUSEWORK: INDEPENDENT
TAKING_MEDICATION: INDEPENDENT
BATHING: INDEPENDENT
MANAGING_FINANCES: INDEPENDENT
GROCERY_SHOPPING: INDEPENDENT
DRESSING: INDEPENDENT

## 2024-09-04 NOTE — PROGRESS NOTES
"Subjective   Reason for Visit: Trey Borjas is an 64 y.o. male here for a Medicare Wellness visit.     Past Medical, Surgical, and Family History reviewed and updated in chart.    Reviewed all medications by prescribing practitioner or clinical pharmacist (such as prescriptions, OTCs, herbal therapies and supplements) and documented in the medical record.    Treated for prostate cancer 3 years ago. Now sees urology once a year.    Seeing his hepatologist a month from now.        Patient Care Team:  Aleksandra Bello MD as PCP - General (Internal Medicine)     Review of Systems   Cardiovascular:  Positive for leg swelling.       Objective   Vitals:  /83 (BP Location: Right arm, Patient Position: Sitting)   Pulse 76   Ht 1.803 m (5' 11\")   Wt 97.5 kg (215 lb)   SpO2 98%   BMI 29.99 kg/m²       Physical Exam  Constitutional:       General: He is not in acute distress.     Appearance: He is not ill-appearing, toxic-appearing or diaphoretic.   HENT:      Head: Normocephalic and atraumatic.   Eyes:      Conjunctiva/sclera: Conjunctivae normal.   Musculoskeletal:      Right lower leg: Edema present.      Left lower leg: Edema present.   Neurological:      Mental Status: He is alert.         Assessment & Plan  Medicare annual wellness visit, subsequent         Goals of care, counseling/discussion         Rheumatoid arthritis, involving unspecified site, unspecified whether rheumatoid factor present (Multi)    Orders:    Rheumatoid factor; Future    Citrulline Antibody, IgG; Future     -Pt remembers being told he has RA but doesn't know if he was tested for it. Has never been on medication for it. Does get foot and hand pain. Will test lvls; if positive then may benefit from coming back to review medication management.    Advance Directives Discussion  Advanced Care Planning (including a Living Will, Healthcare POA, as well as specific end of life choices and/or directives), was discussed with the patient and/or " surrogate, voluntarily, and details of that discussion documented in the Problem List (under Advanced Directives Discussion) of the medical record.  Pt has no HCPOA or living will. Has no spouse or kids, but does have 2 brothers and 3 sisters. Would want his brother Hugo (contact number in chart) to be his primary decision maker. Will work to get HCPOA set up to reflect this. Does want to be full code.   (~16 min spent discussing above)

## 2024-09-23 DIAGNOSIS — Z00.00 ENCOUNTER FOR GENERAL ADULT MEDICAL EXAMINATION WITHOUT ABNORMAL FINDINGS: ICD-10-CM

## 2024-09-23 RX ORDER — PANTOPRAZOLE SODIUM 40 MG/1
40 TABLET, DELAYED RELEASE ORAL DAILY
Qty: 90 TABLET | Refills: 3 | Status: SHIPPED | OUTPATIENT
Start: 2024-09-23

## 2024-09-23 RX ORDER — FOLIC ACID 1 MG/1
1 TABLET ORAL DAILY
Qty: 90 TABLET | Refills: 3 | Status: SHIPPED | OUTPATIENT
Start: 2024-09-23

## 2024-09-29 NOTE — PROGRESS NOTES
Patient ID: Trey Borjas is a 64 y.o. male who presents for cirrhosis.    HPI  64 year old with history of COPD, HTN, SDH, BPH and AUD referred for management of cirrhosis.  He has been followed by his primary gastroenterologist who has been monitoring labs.      Most recent labs 07/2024:  Alk Phos 297, AST 42, ALT 21, bili 4.2, Alb 3.4, Scr 0.73, Na 143, INR 1.7.  Plt 90  MELD 18.    EGD 04/2024:  no varices  No recent dedicated imaging      Review of Systems    Objective   Physical Exam    Current Outpatient Medications   Medication Sig Dispense Refill   • allopurinol (Zyloprim) 300 mg tablet TAKE 1 TABLET BY MOUTH EVERY DAY 90 tablet 3   • folic acid (Folvite) 1 mg tablet TAKE 1 TABLET BY MOUTH EVERY DAY AS DIRECTED 90 tablet 3   • furosemide (Lasix) 20 mg tablet Take 1 tablet (20 mg) by mouth early in the morning..     • gabapentin (Neurontin) 100 mg capsule Take 2 capsules (200 mg) by mouth 3 times a day. 360 capsule 3   • levETIRAcetam (Keppra) 500 mg tablet Take 1 tablet (500 mg) by mouth 2 times a day. 60 tablet 2   • metoprolol tartrate (Lopressor) 25 mg tablet Take 0.5 tablets (12.5 mg) by mouth 2 times a day. 90 tablet 3   • multivitamin with minerals (multivit-min-iron fum-folic ac) tablet Take 1 tablet by mouth once daily.     • pantoprazole (ProtoNix) 40 mg EC tablet TAKE 1 TABLET BY MOUTH EVERY DAY 90 tablet 3   • tamsulosin (Flomax) 0.4 mg 24 hr capsule TAKE 1 CAPSULE BY MOUTH EVERY DAY 90 capsule 3     No current facility-administered medications for this visit.     {LABS (Optional):91697}    {IMAGING (Optional):67055}     Assessment/Plan   {Assess/PlanSmartLinks:29203}         ANDREIA Trimble-CNP 09/29/24 9:09 AM

## 2024-09-30 ENCOUNTER — APPOINTMENT (OUTPATIENT)
Dept: GASTROENTEROLOGY | Facility: CLINIC | Age: 64
End: 2024-09-30
Payer: MEDICARE

## 2024-10-01 ENCOUNTER — OFFICE VISIT (OUTPATIENT)
Dept: GASTROENTEROLOGY | Facility: CLINIC | Age: 64
End: 2024-10-01
Payer: MEDICARE

## 2024-10-01 ENCOUNTER — LAB (OUTPATIENT)
Dept: LAB | Facility: LAB | Age: 64
End: 2024-10-01
Payer: MEDICARE

## 2024-10-01 VITALS
BODY MASS INDEX: 30.38 KG/M2 | HEART RATE: 65 BPM | SYSTOLIC BLOOD PRESSURE: 139 MMHG | DIASTOLIC BLOOD PRESSURE: 77 MMHG | WEIGHT: 217 LBS | TEMPERATURE: 99.1 F | HEIGHT: 71 IN

## 2024-10-01 DIAGNOSIS — K70.30 ALCOHOLIC CIRRHOSIS OF LIVER WITHOUT ASCITES (MULTI): ICD-10-CM

## 2024-10-01 LAB — AFP SERPL-MCNC: 5 NG/ML (ref 0–9)

## 2024-10-01 PROCEDURE — 3008F BODY MASS INDEX DOCD: CPT | Performed by: INTERNAL MEDICINE

## 2024-10-01 PROCEDURE — 99204 OFFICE O/P NEW MOD 45 MIN: CPT | Performed by: INTERNAL MEDICINE

## 2024-10-01 PROCEDURE — 99214 OFFICE O/P EST MOD 30 MIN: CPT | Performed by: INTERNAL MEDICINE

## 2024-10-01 PROCEDURE — 1036F TOBACCO NON-USER: CPT | Performed by: INTERNAL MEDICINE

## 2024-10-01 PROCEDURE — 1159F MED LIST DOCD IN RCRD: CPT | Performed by: INTERNAL MEDICINE

## 2024-10-01 PROCEDURE — 1123F ACP DISCUSS/DSCN MKR DOCD: CPT | Performed by: INTERNAL MEDICINE

## 2024-10-01 PROCEDURE — 3078F DIAST BP <80 MM HG: CPT | Performed by: INTERNAL MEDICINE

## 2024-10-01 PROCEDURE — 3075F SYST BP GE 130 - 139MM HG: CPT | Performed by: INTERNAL MEDICINE

## 2024-10-01 ASSESSMENT — ENCOUNTER SYMPTOMS
TROUBLE SWALLOWING: 0
CHILLS: 0
ARTHRALGIAS: 0
DIARRHEA: 0
HEADACHES: 0
LIGHT-HEADEDNESS: 0
SPEECH DIFFICULTY: 0
COLOR CHANGE: 0
DIFFICULTY URINATING: 0
SHORTNESS OF BREATH: 0
UNEXPECTED WEIGHT CHANGE: 0
ABDOMINAL DISTENTION: 0
DIZZINESS: 0
SLEEP DISTURBANCE: 0
COUGH: 0
CONFUSION: 0
JOINT SWELLING: 0
FEVER: 0
ABDOMINAL PAIN: 0
WHEEZING: 0
NAUSEA: 0
VOMITING: 0
CONSTIPATION: 0

## 2024-10-01 NOTE — PROGRESS NOTES
Subjective  He was noted to be jaundiced in the spring which prompted an admission and prolonged hospitalization in the setting of an alcohol use disorder in remission since April 2024. He was also noted to be fluid overloaded which has improved considerably on furosemide. He denies cognitive impairment and overall feels much improved.       Review of Systems   Constitutional:  Negative for chills, fever and unexpected weight change.   HENT:  Negative for congestion and trouble swallowing.    Respiratory:  Negative for cough, shortness of breath and wheezing.    Cardiovascular:  Negative for chest pain.   Gastrointestinal:  Negative for abdominal distention, abdominal pain, constipation, diarrhea, nausea and vomiting.   Genitourinary:  Negative for difficulty urinating.   Musculoskeletal:  Negative for arthralgias and joint swelling.   Skin:  Negative for color change.   Neurological:  Negative for dizziness, speech difficulty, light-headedness and headaches.   Psychiatric/Behavioral:  Negative for confusion and sleep disturbance.        Physical Exam  Constitutional:       General: He is awake.      Appearance: Normal appearance.   HENT:      Head: Normocephalic and atraumatic.      Nose: Nose normal.      Mouth/Throat:      Mouth: Mucous membranes are moist.   Eyes:      Pupils: Pupils are equal, round, and reactive to light.   Neck:      Thyroid: No thyroid mass.      Trachea: Phonation normal.   Cardiovascular:      Rate and Rhythm: Normal rate and regular rhythm.      Heart sounds: Normal heart sounds. No murmur heard.     No gallop.   Pulmonary:      Effort: Pulmonary effort is normal. No respiratory distress.      Breath sounds: Normal air entry. No decreased breath sounds, wheezing, rhonchi or rales.   Abdominal:      General: Bowel sounds are normal. There is no distension.      Palpations: Abdomen is soft.      Tenderness: There is no abdominal tenderness.   Musculoskeletal:      Cervical back: Neck  "supple.      Right lower leg: Edema present.      Left lower leg: Edema present.   Skin:     General: Skin is warm.      Capillary Refill: Capillary refill takes less than 2 seconds.   Neurological:      General: No focal deficit present.      Mental Status: He is alert and oriented to person, place, and time. Mental status is at baseline.      Cranial Nerves: Cranial nerves 2-12 are intact.      Motor: Motor function is intact.   Psychiatric:         Attention and Perception: Attention and perception normal.         Mood and Affect: Mood normal.         Speech: Speech normal.         Behavior: Behavior normal.     LABS:   Lab Results   Component Value Date    ALBUMIN 3.4 07/29/2024    BILITOT 4.2 (H) 07/29/2024    BILIDIR 2.0 (H) 04/10/2024    ALKPHOS 297 (H) 07/29/2024    ALT 21 07/29/2024    AST 42 (H) 07/29/2024    PROT 7.0 07/29/2024    [id number] 219 (H) 01/24/2023    AYAKA NEGATIVE 01/24/2023    MITOAB NEGATIVE 01/24/2023    SMOOTHMUSCAB NEGATIVE 01/24/2023    CERULOPLSM 37 01/24/2023    HAVTO NONREACTIVE 01/24/2023    HEPBSAB 7.7 01/24/2023    HEPBCAB NONREACTIVE 01/24/2023    HEPBSAG NONREACTIVE 01/24/2023    HEPCAB NONREACTIVE 01/24/2023    INR 1.7 (H) 07/29/2024    FERRITIN 627 (H) 01/24/2023    IRON 110 01/24/2023    IRONSAT 30 01/24/2023      No results found for: \"AFHO638\", \"TYWL929\"   Lab Results   Component Value Date     07/29/2024    CREATININE 0.73 07/29/2024    BILITOT 4.2 (H) 07/29/2024    INR 1.7 (H) 07/29/2024     No results found for: \"AFP\"   Lab Results   Component Value Date    ALBUMIN 3.4 07/29/2024    BILITOT 4.2 (H) 07/29/2024    BILIDIR 2.0 (H) 04/10/2024    ALKPHOS 297 (H) 07/29/2024    ALT 21 07/29/2024    AST 42 (H) 07/29/2024    PROT 7.0 07/29/2024      Lab Results   Component Value Date    WBC 4.6 07/29/2024    HGB 9.0 (L) 07/29/2024    HCT 28.9 (L) 07/29/2024    MCV 93 07/29/2024    PLT 90 (L) 07/29/2024     Lab Results   Component Value Date    GLUCOSE 109 (H) 07/29/2024    " CALCIUM 9.3 07/29/2024     07/29/2024    K 3.8 07/29/2024    CO2 23 07/29/2024     (H) 07/29/2024    BUN 12 07/29/2024    CREATININE 0.73 07/29/2024     Lab Results   Component Value Date    INR 1.7 (H) 07/29/2024        === 04/10/24 ===    US GALLBLADDER    - Impression -  Cirrhotic changes within the liver.  Cholelithiasis.  No gallbladder wall thickening or biliary dilatation  is appreciated.  No significant change compared to prior imaging.  Signed by Huang Yuan MD         === 05/07/24 ===    CT HEAD WO IV CONTRAST    - Impression -  The current study demonstrates a residual but less hyperdense right  hemispheric subdural hematoma currently intermediate attenuation most  pronounced adjacent to the posterior right parietal lobe as seen on  sagittal reconstructed images slice 25 of 84. It measures  approximately 4 mm in greatest thickness on both the current and  prior study.    No new hyperdense acute intracranial hemorrhage is noted.    There is again evidence of similar mild-to-moderate brain parenchymal  volume loss.    Minimal nonspecific white matter changes are noted within the  cerebral hemispheres bilaterally which while nonspecific, given the  patient's age, likely represent sequelae of small-vessel ischemic  change.    I personally reviewed the images/study and I agree with the findings  as stated by Dr. Noam James. This study was interpreted at [facility], [place], [place].    MACRO:  None    Signed by: Leopoldo Harris 5/7/2024 10:46 AM  Dictation workstation:   [id number]        Alcoholic cirrhosis of liver without ascites (Multi)  This patient has cirrhosis and we discussed natural history. we emphasized the importance of health maintenance interventions to prevent complications of liver disease including  a) lifelong abstinence  b) MELD/US/AFP every 6 months to assess liver function and survey for HCC  c) initiation of carvedilol if fibroscan and  other fibrosis non invasive tests are consistent with clinically significant portal hypertension  d) watch out for symptoms of decompensated liver disease including cognitive impairment, excessive somnolence, lower extremity edema and increase in abdominal girth  e) follow up in the office every 6 months

## 2024-10-01 NOTE — PATIENT INSTRUCTIONS
If you have any questions or need assistance, please don't hesitate to contact us.     Our offices are located at [facility].  Please use the numbers below when calling.   Dr. Madsen: Office [phone] Fax: [phone]  Hepatology Nurse Coordinator: Jemma CEJA [phone]       [phone]   (See below for department name when scheduling)      PLAN OF CARE:    TEST DUE    [x]   LABS  Type: WORKUP: (Labs can be done at any  location)  We will complete a workup for underlying causes of liver disease has been ordered.  This includes checking to see if you are protected against Hepatitis A & B. If you are not, it is recommended that anyone with chronic liver disease be vaccinated.  Due : Today and April    [x]   IMAGING (Department Radiology)  Type: US Due : First available and April    [x]   FIBROSCAN (Department Gastro)  Type: FIBROSCAN: Type of liver elastography.  A special ultrasound that measures scarring (fibrosis) and fat (steatosis) in the liver.  The Fibroscan is located at 3 locations:  [facility], [facility]), [facility] PLEASE DO NOT EAT OR DRINK 3 HOURS BEFORE YOUR EXAM  Due : First available    [x]   FOLLOW UP  (Department Gastro)   Due : 6 Month(s)    []           Dr. Madsen  Locations: MON: [facility] ([address]) 8:00 AM - 12:00 PM   TUES: [facility] ([address]) 8:00 AM - 3:40 PM

## 2024-10-02 LAB — ANA SER QL HEP2 SUBST: NEGATIVE

## 2024-10-03 LAB — MITOCHONDRIA AB SER QL IF: NEGATIVE

## 2024-10-04 LAB
LABORATORY REPORT: NORMAL
PETH INTERPRETATION: NORMAL
PLPETH BLD-MCNC: <10 NG/ML
POPETH BLD-MCNC: <10 NG/ML

## 2024-10-08 ENCOUNTER — HOSPITAL ENCOUNTER (OUTPATIENT)
Dept: RADIOLOGY | Facility: CLINIC | Age: 64
Discharge: HOME | End: 2024-10-08
Payer: MEDICARE

## 2024-10-08 DIAGNOSIS — K70.30 ALCOHOLIC CIRRHOSIS OF LIVER WITHOUT ASCITES (MULTI): ICD-10-CM

## 2024-10-08 PROCEDURE — 76705 ECHO EXAM OF ABDOMEN: CPT

## 2024-10-08 PROCEDURE — 76705 ECHO EXAM OF ABDOMEN: CPT | Performed by: RADIOLOGY

## 2024-10-14 ENCOUNTER — APPOINTMENT (OUTPATIENT)
Dept: GASTROENTEROLOGY | Facility: CLINIC | Age: 64
End: 2024-10-14
Payer: MEDICARE

## 2024-11-03 DIAGNOSIS — S06.5XAA SUBDURAL HEMATOMA (MULTI): ICD-10-CM

## 2024-11-04 RX ORDER — LEVETIRACETAM 500 MG/1
500 TABLET ORAL 2 TIMES DAILY
Qty: 60 TABLET | Refills: 2 | Status: SHIPPED | OUTPATIENT
Start: 2024-11-04

## 2024-12-02 ENCOUNTER — CLINICAL SUPPORT (OUTPATIENT)
Dept: GASTROENTEROLOGY | Facility: CLINIC | Age: 64
End: 2024-12-02
Payer: MEDICARE

## 2024-12-02 DIAGNOSIS — K70.30 ALCOHOLIC CIRRHOSIS OF LIVER WITHOUT ASCITES (MULTI): ICD-10-CM

## 2024-12-02 PROCEDURE — 91200 LIVER ELASTOGRAPHY: CPT | Performed by: INTERNAL MEDICINE

## 2024-12-16 ENCOUNTER — APPOINTMENT (OUTPATIENT)
Dept: GASTROENTEROLOGY | Facility: CLINIC | Age: 64
End: 2024-12-16
Payer: MEDICARE

## 2025-02-24 DIAGNOSIS — S06.5XAA SUBDURAL HEMATOMA (MULTI): ICD-10-CM

## 2025-02-24 RX ORDER — LEVETIRACETAM 500 MG/1
500 TABLET ORAL 2 TIMES DAILY
Qty: 180 TABLET | Refills: 3 | Status: SHIPPED | OUTPATIENT
Start: 2025-02-24

## 2025-02-25 DIAGNOSIS — C61 MALIGNANT NEOPLASM OF PROSTATE (MULTI): ICD-10-CM

## 2025-02-25 DIAGNOSIS — M10.9 GOUT, UNSPECIFIED: ICD-10-CM

## 2025-02-25 RX ORDER — ALLOPURINOL 300 MG/1
300 TABLET ORAL DAILY
Qty: 90 TABLET | Refills: 3 | Status: SHIPPED | OUTPATIENT
Start: 2025-02-25

## 2025-02-25 RX ORDER — TAMSULOSIN HYDROCHLORIDE 0.4 MG/1
0.4 CAPSULE ORAL DAILY
Qty: 90 CAPSULE | Refills: 3 | Status: SHIPPED | OUTPATIENT
Start: 2025-02-25

## 2025-02-27 ENCOUNTER — TELEPHONE (OUTPATIENT)
Dept: PRIMARY CARE | Facility: CLINIC | Age: 65
End: 2025-02-27
Payer: MEDICARE

## 2025-02-27 DIAGNOSIS — I48.92 ATRIAL FLUTTER, UNSPECIFIED TYPE (MULTI): ICD-10-CM

## 2025-02-27 RX ORDER — METOPROLOL TARTRATE 25 MG/1
12.5 TABLET, FILM COATED ORAL 2 TIMES DAILY
Qty: 90 TABLET | Refills: 3 | Status: SHIPPED | OUTPATIENT
Start: 2025-02-27 | End: 2026-02-27

## 2025-03-05 ENCOUNTER — APPOINTMENT (OUTPATIENT)
Dept: PRIMARY CARE | Facility: CLINIC | Age: 65
End: 2025-03-05
Payer: MEDICARE

## 2025-03-05 VITALS
BODY MASS INDEX: 29.55 KG/M2 | DIASTOLIC BLOOD PRESSURE: 80 MMHG | WEIGHT: 211.9 LBS | SYSTOLIC BLOOD PRESSURE: 132 MMHG | OXYGEN SATURATION: 95 % | RESPIRATION RATE: 10 BRPM | HEART RATE: 87 BPM

## 2025-03-05 DIAGNOSIS — I48.92 ATRIAL FLUTTER, UNSPECIFIED TYPE (MULTI): ICD-10-CM

## 2025-03-05 DIAGNOSIS — G62.9 NEUROPATHY: Primary | ICD-10-CM

## 2025-03-05 DIAGNOSIS — J44.0 CHRONIC OBSTRUCTIVE PULMONARY DISEASE WITH ACUTE LOWER RESPIRATORY INFECTION (MULTI): ICD-10-CM

## 2025-03-05 DIAGNOSIS — G62.89 OTHER POLYNEUROPATHY: ICD-10-CM

## 2025-03-05 PROBLEM — M06.9 RHEUMATOID ARTHRITIS: Status: RESOLVED | Noted: 2018-10-12 | Resolved: 2025-03-05

## 2025-03-05 PROCEDURE — 1036F TOBACCO NON-USER: CPT | Performed by: INTERNAL MEDICINE

## 2025-03-05 PROCEDURE — 99213 OFFICE O/P EST LOW 20 MIN: CPT | Performed by: INTERNAL MEDICINE

## 2025-03-05 PROCEDURE — 3075F SYST BP GE 130 - 139MM HG: CPT | Performed by: INTERNAL MEDICINE

## 2025-03-05 PROCEDURE — 1123F ACP DISCUSS/DSCN MKR DOCD: CPT | Performed by: INTERNAL MEDICINE

## 2025-03-05 PROCEDURE — 3079F DIAST BP 80-89 MM HG: CPT | Performed by: INTERNAL MEDICINE

## 2025-03-05 PROCEDURE — 1159F MED LIST DOCD IN RCRD: CPT | Performed by: INTERNAL MEDICINE

## 2025-03-05 RX ORDER — CHLORHEXIDINE GLUCONATE ORAL RINSE 1.2 MG/ML
15 SOLUTION DENTAL AS NEEDED
COMMUNITY
Start: 2024-05-09

## 2025-03-05 RX ORDER — GABAPENTIN 300 MG/1
300 CAPSULE ORAL 3 TIMES DAILY
Qty: 90 CAPSULE | Refills: 11 | Status: SHIPPED | OUTPATIENT
Start: 2025-03-05

## 2025-03-05 ASSESSMENT — ENCOUNTER SYMPTOMS: NUMBNESS: 1

## 2025-03-05 NOTE — PROGRESS NOTES
Subjective   Patient ID: Trey Borjas is a 64 y.o. male who presents for Follow-up.    Pt states he is doing OK following up with everyone.    Was told by Dr. Ca he is doing well. Doesn't believe he got a watchman placed.    Is still dealing with arthritis. Occasionally gets numbness in his feet and will get pain as well. Is wearing wide-toed shoes to help with symptoms. Gabapentin helping with symptoms. Hasn't changed in severity, but will have worse days.    Last drink 4/2024. Prior to that hadn't drank for months.    Has been biking more since custodial. Stopped the last couple months, but will resume with the weather improving.        Review of Systems   Neurological:  Positive for numbness.       /80 (BP Location: Left arm, Patient Position: Sitting)   Pulse 87   Resp 10   Wt 96.1 kg (211 lb 14.4 oz)   SpO2 95%   BMI 29.55 kg/m²   Objective   Physical Exam  Constitutional:       General: He is not in acute distress.     Appearance: He is not ill-appearing, toxic-appearing or diaphoretic.   HENT:      Head: Normocephalic and atraumatic.   Eyes:      Conjunctiva/sclera: Conjunctivae normal.   Neurological:      Mental Status: He is alert.         Assessment/Plan   Problem List Items Addressed This Visit             ICD-10-CM    Chronic obstructive pulmonary disease with acute lower respiratory infection (Multi) J44.0     Other Visit Diagnoses         Codes    Neuropathy    -  Primary G62.9    Other polyneuropathy     G62.89    Relevant Medications    gabapentin (Neurontin) 300 mg capsule        -Documented as having COPD; pt denies having this. Not on an inhaler. Given albuterol in the past.  -Neuropathy still bothersome for pt. Will see if increased dose (300mg tid rather than 200mg tid) is helpful.  -Pt doesn't recall discussion of a watchman procedure. I reviewed it was recommended for an episode of aflutter in the past. Pt's cardiologist is Dr. Ca. Will do a records request to see what is  being done there. Pt agreeable.         Aleksandra Bello MD 03/05/25 10:12 AM

## 2025-04-01 ENCOUNTER — HOSPITAL ENCOUNTER (OUTPATIENT)
Dept: RADIOLOGY | Facility: CLINIC | Age: 65
Discharge: HOME | End: 2025-04-01
Payer: MEDICARE

## 2025-04-01 ENCOUNTER — APPOINTMENT (OUTPATIENT)
Dept: GASTROENTEROLOGY | Facility: CLINIC | Age: 65
End: 2025-04-01
Payer: MEDICARE

## 2025-04-01 DIAGNOSIS — K70.30 ALCOHOLIC CIRRHOSIS OF LIVER WITHOUT ASCITES (MULTI): ICD-10-CM

## 2025-04-01 PROCEDURE — 76705 ECHO EXAM OF ABDOMEN: CPT

## 2025-04-01 PROCEDURE — 76705 ECHO EXAM OF ABDOMEN: CPT | Performed by: STUDENT IN AN ORGANIZED HEALTH CARE EDUCATION/TRAINING PROGRAM

## 2025-04-07 ENCOUNTER — OFFICE VISIT (OUTPATIENT)
Dept: GASTROENTEROLOGY | Facility: CLINIC | Age: 65
End: 2025-04-07
Payer: MEDICARE

## 2025-04-07 VITALS
SYSTOLIC BLOOD PRESSURE: 135 MMHG | RESPIRATION RATE: 16 BRPM | WEIGHT: 214 LBS | BODY MASS INDEX: 29.96 KG/M2 | HEART RATE: 90 BPM | HEIGHT: 71 IN | TEMPERATURE: 97.2 F | DIASTOLIC BLOOD PRESSURE: 76 MMHG

## 2025-04-07 DIAGNOSIS — K70.30 ALCOHOLIC CIRRHOSIS OF LIVER WITHOUT ASCITES (MULTI): ICD-10-CM

## 2025-04-07 PROCEDURE — 1159F MED LIST DOCD IN RCRD: CPT | Performed by: INTERNAL MEDICINE

## 2025-04-07 PROCEDURE — 1036F TOBACCO NON-USER: CPT | Performed by: INTERNAL MEDICINE

## 2025-04-07 PROCEDURE — 3008F BODY MASS INDEX DOCD: CPT | Performed by: INTERNAL MEDICINE

## 2025-04-07 PROCEDURE — 99213 OFFICE O/P EST LOW 20 MIN: CPT | Performed by: INTERNAL MEDICINE

## 2025-04-07 PROCEDURE — 3075F SYST BP GE 130 - 139MM HG: CPT | Performed by: INTERNAL MEDICINE

## 2025-04-07 PROCEDURE — 1125F AMNT PAIN NOTED PAIN PRSNT: CPT | Performed by: INTERNAL MEDICINE

## 2025-04-07 PROCEDURE — 1123F ACP DISCUSS/DSCN MKR DOCD: CPT | Performed by: INTERNAL MEDICINE

## 2025-04-07 PROCEDURE — 3078F DIAST BP <80 MM HG: CPT | Performed by: INTERNAL MEDICINE

## 2025-04-07 ASSESSMENT — ENCOUNTER SYMPTOMS
LIGHT-HEADEDNESS: 0
NAUSEA: 0
COLOR CHANGE: 0
WHEEZING: 0
CONFUSION: 0
CHILLS: 0
BLOOD IN STOOL: 0
VOMITING: 0
DIFFICULTY URINATING: 0
DIARRHEA: 0
UNEXPECTED WEIGHT CHANGE: 0
ABDOMINAL PAIN: 0
TROUBLE SWALLOWING: 0
FEVER: 0
JOINT SWELLING: 0
SHORTNESS OF BREATH: 0
CONSTIPATION: 0
HEADACHES: 0
DIZZINESS: 0
ARTHRALGIAS: 0
SLEEP DISTURBANCE: 0
ABDOMINAL DISTENTION: 0
COUGH: 0
SPEECH DIFFICULTY: 0

## 2025-04-07 ASSESSMENT — PAIN SCALES - GENERAL: PAINLEVEL_OUTOF10: 4

## 2025-04-07 NOTE — PATIENT INSTRUCTIONS
To ensure you receive the best care during your appointments, it is important to have tests done before your visit. This helps us assess your liver health accurately and tailor your treatment plan accordingly.    If you have any questions or need assistance, please don't hesitate to contact us.     Please use the numbers below when calling.   Dr. Madsen:         Office [phone] Fax: [phone]  Hepatology Nurse Coordinator:         Jemma CEJA [phone]     SCHEDULING  You will get a notification through [employer] or a phone call to help you schedule all your tests. If you prefer, feel free to call the main scheduling number to set up any tests you need.    Main Scheduling Number: [phone]  (See below for department name when scheduling)    Here is a summary of the tests we have ordered and when they are due:    We recommend that you have your lab tests and liver ultrasound done every six months. Ideally, try to schedule these tests around the same time to help us monitor your liver health and screen for liver cancer effectively.      [x] LABS (Can be done at any /[facility] Location)  Due : Today and Oct  LIVER CANCER SCREENING: Patients with advanced fibrosis (stage 3 fibrosis) or cirrhosis (stage 4 fibrosis) are at increased risk for liver cancer.  Successful treatment of liver cancer depends on early detection.  You should have regular interval screening for liver cancer every 6 months.  A blood test called Alpha-fetoprotein (AFP) and ultrasound of the liver is an important part of liver cancer screening to detect tumors.  You should also expect to have a follow up appointment with your liver doctor every 6 months    [x] IMAGING (Department Radiology) Due : Oct  ULTRASOUND    [x] FOLLOW UP  (Department Gastro) Due : 6 Month(s)

## 2025-04-07 NOTE — PROGRESS NOTES
Subjective  He is doing well and has no interim complaints. There are no updated labs but US is at baseline. He denies fluid overload or cognitive impairment.     Review of Systems   Constitutional:  Negative for chills, fever and unexpected weight change.   HENT:  Negative for congestion and trouble swallowing.    Respiratory:  Negative for cough, shortness of breath and wheezing.    Cardiovascular:  Positive for leg swelling. Negative for chest pain.   Gastrointestinal:  Negative for abdominal distention, abdominal pain, blood in stool, constipation, diarrhea, nausea and vomiting.   Genitourinary:  Negative for difficulty urinating.   Musculoskeletal:  Negative for arthralgias and joint swelling.   Skin:  Negative for color change.   Neurological:  Negative for dizziness, speech difficulty, light-headedness and headaches.   Psychiatric/Behavioral:  Negative for confusion and sleep disturbance.        Physical Exam  Constitutional:       General: He is awake.      Appearance: Normal appearance.   HENT:      Head: Normocephalic and atraumatic.      Nose: Nose normal.      Mouth/Throat:      Mouth: Mucous membranes are moist.   Eyes:      Pupils: Pupils are equal, round, and reactive to light.   Neck:      Thyroid: No thyroid mass.      Trachea: Phonation normal.   Cardiovascular:      Rate and Rhythm: Normal rate and regular rhythm.   Pulmonary:      Effort: Pulmonary effort is normal. No respiratory distress.      Breath sounds: Normal air entry. No decreased breath sounds, wheezing, rhonchi or rales.   Abdominal:      General: Bowel sounds are normal. There is no distension.      Palpations: Abdomen is soft.      Tenderness: There is no abdominal tenderness.   Musculoskeletal:      Cervical back: Neck supple.      Right lower leg: No edema.      Left lower leg: No edema.   Skin:     General: Skin is warm.      Capillary Refill: Capillary refill takes less than 2 seconds.   Neurological:      General: No focal  deficit present.      Mental Status: He is alert and oriented to person, place, and time. Mental status is at baseline.      Cranial Nerves: Cranial nerves 2-12 are intact.      Motor: Motor function is intact.   Psychiatric:         Attention and Perception: Attention and perception normal.         Mood and Affect: Mood normal.         Speech: Speech normal.         Behavior: Behavior normal.         === 04/01/25 ===    US ABDOMEN LIMITED LIVER    - Impression -  1. Cirrhotic hepatic morphology without gross lesions.  2. Distended gallbladder with stones but no acute changes.    MACRO:  None    Signed by: Monroe Morrison 4/2/2025 6:06 AM  Dictation workstation:   [id number]     Alcoholic cirrhosis of liver without ascites (Multi)  This patient has cirrhosis and we discussed natural history. we emphasized the importance of health maintenance interventions to prevent complications of liver disease including  a) lifelong abstinence  b) MELD/US/AFP every 6 months to assess liver function and survey for HCC  c) initiation of carvedilol if fibroscan and other fibrosis non invasive tests are consistent with clinically significant portal hypertension  d) watch out for symptoms of decompensated liver disease including cognitive impairment, excessive somnolence, lower extremity edema and increase in abdominal girth  e) follow up in the office every 6 months  f) perform fibroscan annually

## 2025-04-09 LAB
AFP-TM SERPL-MCNC: 6.4 NG/ML
ALBUMIN SERPL-MCNC: 3.5 G/DL (ref 3.6–5.1)
ALP SERPL-CCNC: 265 U/L (ref 35–144)
ALT SERPL-CCNC: 21 U/L (ref 9–46)
ANION GAP SERPL CALCULATED.4IONS-SCNC: 7 MMOL/L (CALC) (ref 7–17)
AST SERPL-CCNC: 38 U/L (ref 10–35)
BILIRUB DIRECT SERPL-MCNC: 1.4 MG/DL
BILIRUB SERPL-MCNC: 4.5 MG/DL (ref 0.2–1.2)
BUN SERPL-MCNC: 11 MG/DL (ref 7–25)
CALCIUM SERPL-MCNC: 9.3 MG/DL (ref 8.6–10.3)
CHLORIDE SERPL-SCNC: 109 MMOL/L (ref 98–110)
CO2 SERPL-SCNC: 25 MMOL/L (ref 20–32)
CREAT SERPL-MCNC: 0.68 MG/DL (ref 0.7–1.35)
EGFRCR SERPLBLD CKD-EPI 2021: 103 ML/MIN/1.73M2
GLUCOSE SERPL-MCNC: 124 MG/DL (ref 65–139)
INR PPP: 1.6
POTASSIUM SERPL-SCNC: 4.4 MMOL/L (ref 3.5–5.3)
PROT SERPL-MCNC: 7.1 G/DL (ref 6.1–8.1)
PROTHROMBIN TIME: 16.3 SEC (ref 9–11.5)
SODIUM SERPL-SCNC: 141 MMOL/L (ref 135–146)

## 2025-05-12 ENCOUNTER — APPOINTMENT (OUTPATIENT)
Dept: GASTROENTEROLOGY | Facility: CLINIC | Age: 65
End: 2025-05-12
Payer: MEDICARE

## 2025-06-16 ENCOUNTER — OFFICE VISIT (OUTPATIENT)
Dept: GASTROENTEROLOGY | Facility: CLINIC | Age: 65
End: 2025-06-16
Payer: MEDICARE

## 2025-06-16 VITALS
HEART RATE: 76 BPM | WEIGHT: 215 LBS | TEMPERATURE: 98.6 F | BODY MASS INDEX: 29.99 KG/M2 | DIASTOLIC BLOOD PRESSURE: 80 MMHG | SYSTOLIC BLOOD PRESSURE: 131 MMHG

## 2025-06-16 DIAGNOSIS — K70.30 ALCOHOLIC CIRRHOSIS OF LIVER WITHOUT ASCITES (MULTI): Primary | ICD-10-CM

## 2025-06-16 PROCEDURE — 3079F DIAST BP 80-89 MM HG: CPT | Performed by: INTERNAL MEDICINE

## 2025-06-16 PROCEDURE — 99213 OFFICE O/P EST LOW 20 MIN: CPT | Performed by: INTERNAL MEDICINE

## 2025-06-16 PROCEDURE — 1159F MED LIST DOCD IN RCRD: CPT | Performed by: INTERNAL MEDICINE

## 2025-06-16 PROCEDURE — 3075F SYST BP GE 130 - 139MM HG: CPT | Performed by: INTERNAL MEDICINE

## 2025-06-16 PROCEDURE — 1126F AMNT PAIN NOTED NONE PRSNT: CPT | Performed by: INTERNAL MEDICINE

## 2025-06-16 ASSESSMENT — PAIN SCALES - GENERAL: PAINLEVEL_OUTOF10: 0-NO PAIN

## 2025-06-16 ASSESSMENT — ENCOUNTER SYMPTOMS: DEPRESSION: 0

## 2025-06-16 NOTE — PATIENT INSTRUCTIONS
If you have any questions or need assistance, please don't hesitate to contact us.        Our OFFICE is located at [facility].                    Please CALL the numbers below:      Dr. Madsen:          Office [phone]         Fax: [phone]  Hepatology Nurse Coordinator:         Jemma CEJA [phone]          SCHEDULING   Main Scheduling Number: [phone] (See below for department name when scheduling)  You will get a notification through [employer]Box or a phone call/text to help you schedule all your tests. If you prefer, feel free to call the main scheduling number to set up any tests you need.        To ensure you receive the best care during your appointments, it is important to have tests done before your visit. This helps us assess your liver health accurately and tailor your treatment plan accordingly.    We recommend that you have your lab tests and liver ultrasound done every six months. Ideally, try to schedule these tests around the same time to help us monitor your liver health and screen for liver cancer effectively.     Here is a summary of the tests we have ordered and when they are due:     [x] LABS (Can be done at any /[facility] Location)  and IMAGING (Department Radiology) Due : Oct  LIVER CANCER SCREENING: Patients with advanced fibrosis (stage 3 fibrosis) or cirrhosis (stage 4 fibrosis) are at increased risk for liver cancer.  Successful treatment of liver cancer depends on early detection.  You should have regular interval screening for liver cancer every 6 months.  A blood test called Alpha-fetoprotein (AFP) and ultrasound of the liver is an important part of liver cancer screening to detect tumors.  You should also expect to have a follow up appointment with your liver doctor every 6 months    [x] FOLLOW UP  (Department Gastro) Due : 6 Month(s)

## 2025-06-17 ASSESSMENT — ENCOUNTER SYMPTOMS
DIZZINESS: 0
TROUBLE SWALLOWING: 0
SLEEP DISTURBANCE: 0
DIARRHEA: 0
FEVER: 0
CONSTIPATION: 0
COUGH: 0
DIFFICULTY URINATING: 0
ABDOMINAL DISTENTION: 0
SPEECH DIFFICULTY: 0
CONFUSION: 0
SHORTNESS OF BREATH: 0
UNEXPECTED WEIGHT CHANGE: 0
HEADACHES: 0
COLOR CHANGE: 0
CHILLS: 0
VOMITING: 0
NAUSEA: 0
JOINT SWELLING: 0
LIGHT-HEADEDNESS: 0
ABDOMINAL PAIN: 0
BLOOD IN STOOL: 0
WHEEZING: 0
ARTHRALGIAS: 0

## 2025-06-17 NOTE — PROGRESS NOTES
Subjective  He has been doing well and remains sober. He denies any fluid overload or cognitive impairment. Most recent imaging shows no focal lesions. He is physically active and working on improving nutrition by eliminating compex carbs    Review of Systems   Constitutional:  Negative for chills, fever and unexpected weight change.   HENT:  Negative for congestion and trouble swallowing.    Respiratory:  Negative for cough, shortness of breath and wheezing.    Cardiovascular:  Negative for chest pain.   Gastrointestinal:  Negative for abdominal distention, abdominal pain, blood in stool, constipation, diarrhea, nausea and vomiting.   Genitourinary:  Negative for difficulty urinating.   Musculoskeletal:  Negative for arthralgias and joint swelling.   Skin:  Negative for color change.   Neurological:  Negative for dizziness, speech difficulty, light-headedness and headaches.   Psychiatric/Behavioral:  Negative for confusion and sleep disturbance.        Physical Exam  Constitutional:       General: He is awake.      Appearance: Normal appearance.   HENT:      Head: Normocephalic and atraumatic.      Nose: Nose normal.      Mouth/Throat:      Mouth: Mucous membranes are moist.   Eyes:      Pupils: Pupils are equal, round, and reactive to light.   Neck:      Thyroid: No thyroid mass.      Trachea: Phonation normal.   Cardiovascular:      Rate and Rhythm: Normal rate and regular rhythm.   Pulmonary:      Effort: Pulmonary effort is normal. No respiratory distress.      Breath sounds: Normal air entry. No decreased breath sounds, wheezing, rhonchi or rales.   Abdominal:      General: Bowel sounds are normal. There is no distension.      Palpations: Abdomen is soft.      Tenderness: There is no abdominal tenderness.   Musculoskeletal:      Cervical back: Neck supple.      Right lower leg: No edema.      Left lower leg: No edema.   Skin:     General: Skin is warm.      Capillary Refill: Capillary refill takes less than 2  seconds.   Neurological:      General: No focal deficit present.      Mental Status: He is alert and oriented to person, place, and time. Mental status is at baseline.      Cranial Nerves: Cranial nerves 2-12 are intact.      Motor: Motor function is intact.   Psychiatric:         Attention and Perception: Attention and perception normal.         Mood and Affect: Mood normal.         Speech: Speech normal.         Behavior: Behavior normal.     LABS:   Lab Results   Component Value Date    ALBUMIN 3.5 (L) 04/08/2025    BILITOT 4.5 (H) 04/08/2025    BILIDIR 1.4 (H) 04/08/2025    ALKPHOS 265 (H) 04/08/2025    ALT 21 04/08/2025    AST 38 (H) 04/08/2025    PROT 7.1 04/08/2025    [id number] 219 (H) 01/24/2023    AFP 6.4 (H) 04/08/2025    AYAKA Negative 10/01/2024    MITOAB Negative 10/01/2024    SMOOTHMUSCAB NEGATIVE 01/24/2023    CERULOPLSM 37 01/24/2023    HAVTO NONREACTIVE 01/24/2023    HEPBSAB 7.7 01/24/2023    HEPBCAB NONREACTIVE 01/24/2023    HEPBSAG NONREACTIVE 01/24/2023    HEPCAB NONREACTIVE 01/24/2023    INR 1.6 (H) 04/08/2025    FERRITIN 627 (H) 01/24/2023    IRON 110 01/24/2023    IRONSAT 30 01/24/2023      Lab Results   Component Value Date    HMQO282 <10 10/01/2024    QKBM879 <10 10/01/2024      Lab Results   Component Value Date     04/08/2025    CREATININE 0.68 (L) 04/08/2025    BILITOT 4.5 (H) 04/08/2025    INR 1.6 (H) 04/08/2025     Lab Results   Component Value Date    AFP 6.4 (H) 04/08/2025        === 04/01/25 ===    US ABDOMEN LIMITED LIVER    - Impression -  1. Cirrhotic hepatic morphology without gross lesions.  2. Distended gallbladder with stones but no acute changes.    MACRO:  None    Signed by: Monroe Morrison 4/2/2025 6:06 AM  Dictation workstation:   [id number]     Alcoholic cirrhosis of liver without ascites (Multi)  This patient has cirrhosis and we discussed natural history. we emphasized the importance of health maintenance interventions to prevent complications of liver disease  including  a) lifelong abstinence  b) MELD/US/AFP every 6 months to assess liver function and survey for HCC  c) initiation of carvedilol if fibroscan and other fibrosis non invasive tests are consistent with clinically significant portal hypertension  d) watch out for symptoms of decompensated liver disease including cognitive impairment, excessive somnolence, lower extremity edema and increase in abdominal girth  e) follow up in the office every 6 months  f) perform fibroscan annually

## 2025-07-08 ENCOUNTER — TELEPHONE (OUTPATIENT)
Dept: GASTROENTEROLOGY | Facility: CLINIC | Age: 65
End: 2025-07-08
Payer: MEDICARE

## 2025-07-08 NOTE — TELEPHONE ENCOUNTER
----- Message from Eve ERVIN sent at 7/8/2025  2:33 PM EDT -----  PT called three times today said he spoke to you did he need US or Fibroscan he is confused about what he needs.

## 2025-09-04 LAB
AFP-TM SERPL-MCNC: 5 NG/ML
ALBUMIN SERPL-MCNC: 3.5 G/DL (ref 3.6–5.1)
ALP SERPL-CCNC: 250 U/L (ref 35–144)
ALT SERPL-CCNC: 21 U/L (ref 9–46)
ANION GAP SERPL CALCULATED.4IONS-SCNC: 7 MMOL/L (CALC) (ref 7–17)
AST SERPL-CCNC: 40 U/L (ref 10–35)
BILIRUB DIRECT SERPL-MCNC: 1.5 MG/DL
BILIRUB SERPL-MCNC: 5 MG/DL (ref 0.2–1.2)
BUN SERPL-MCNC: 13 MG/DL (ref 7–25)
CALCIUM SERPL-MCNC: 9 MG/DL (ref 8.6–10.3)
CHLORIDE SERPL-SCNC: 107 MMOL/L (ref 98–110)
CO2 SERPL-SCNC: 25 MMOL/L (ref 20–32)
CREAT SERPL-MCNC: 0.71 MG/DL (ref 0.7–1.35)
EGFRCR SERPLBLD CKD-EPI 2021: 101 ML/MIN/1.73M2
GLUCOSE SERPL-MCNC: 110 MG/DL (ref 65–99)
INR PPP: 1.4
POTASSIUM SERPL-SCNC: 4.2 MMOL/L (ref 3.5–5.3)
PROT SERPL-MCNC: 7.2 G/DL (ref 6.1–8.1)
PROTHROMBIN TIME: 14.2 SEC (ref 9–11.5)
SODIUM SERPL-SCNC: 139 MMOL/L (ref 135–146)

## 2025-09-08 ENCOUNTER — APPOINTMENT (OUTPATIENT)
Dept: GASTROENTEROLOGY | Facility: CLINIC | Age: 65
End: 2025-09-08
Payer: MEDICARE

## 2025-09-08 ENCOUNTER — APPOINTMENT (OUTPATIENT)
Dept: PRIMARY CARE | Facility: CLINIC | Age: 65
End: 2025-09-08
Payer: MEDICARE

## 2025-09-09 ENCOUNTER — APPOINTMENT (OUTPATIENT)
Dept: GASTROENTEROLOGY | Facility: CLINIC | Age: 65
End: 2025-09-09
Payer: MEDICARE

## 2025-09-30 ENCOUNTER — APPOINTMENT (OUTPATIENT)
Dept: GASTROENTEROLOGY | Facility: CLINIC | Age: 65
End: 2025-09-30
Payer: MEDICARE

## 2025-10-02 ENCOUNTER — APPOINTMENT (OUTPATIENT)
Dept: PRIMARY CARE | Facility: CLINIC | Age: 65
End: 2025-10-02
Payer: MEDICARE

## 2025-10-02 ENCOUNTER — APPOINTMENT (OUTPATIENT)
Dept: RADIOLOGY | Facility: CLINIC | Age: 65
End: 2025-10-02
Payer: MEDICARE

## 2025-10-06 ENCOUNTER — APPOINTMENT (OUTPATIENT)
Dept: GASTROENTEROLOGY | Facility: CLINIC | Age: 65
End: 2025-10-06
Payer: MEDICARE

## 2025-12-02 ENCOUNTER — APPOINTMENT (OUTPATIENT)
Dept: GASTROENTEROLOGY | Facility: CLINIC | Age: 65
End: 2025-12-02
Payer: MEDICARE